# Patient Record
Sex: FEMALE | Race: WHITE | NOT HISPANIC OR LATINO | Employment: FULL TIME | RURAL
[De-identification: names, ages, dates, MRNs, and addresses within clinical notes are randomized per-mention and may not be internally consistent; named-entity substitution may affect disease eponyms.]

---

## 2019-04-09 ENCOUNTER — HISTORICAL (OUTPATIENT)
Dept: ADMINISTRATIVE | Facility: HOSPITAL | Age: 24
End: 2019-04-09

## 2019-04-12 LAB
LAB AP GENERAL CAT - HISTORICAL: NORMAL
LAB AP INTERPRETATION/RESULT - HISTORICAL: NORMAL
LAB AP SPECIMEN ADEQUACY - HISTORICAL: NORMAL
LAB AP SPECIMEN SUBMITTED - HISTORICAL: NORMAL

## 2021-07-13 ENCOUNTER — CLINICAL SUPPORT (OUTPATIENT)
Dept: PRIMARY CARE CLINIC | Facility: CLINIC | Age: 26
End: 2021-07-13

## 2021-07-13 DIAGNOSIS — Z23 NEED FOR DTAP VACCINE: ICD-10-CM

## 2021-07-13 DIAGNOSIS — Z11.1 SCREENING-PULMONARY TB: Primary | ICD-10-CM

## 2021-07-13 PROCEDURE — 90471 TDAP VACCINE GREATER THAN OR EQUAL TO 7YO IM: ICD-10-PCS | Mod: ,,, | Performed by: NURSE PRACTITIONER

## 2021-07-13 PROCEDURE — 90471 IMMUNIZATION ADMIN: CPT | Mod: ,,, | Performed by: NURSE PRACTITIONER

## 2021-07-15 LAB
TB INDURATION - 48 HR READ: 0 MM
TB INDURATION - 72 HR READ: NORMAL
TB SKIN TEST - 48 HR READ: NEGATIVE
TB SKIN TEST - 72 HR READ: NORMAL

## 2021-10-26 ENCOUNTER — OFFICE VISIT (OUTPATIENT)
Dept: OBSTETRICS AND GYNECOLOGY | Facility: CLINIC | Age: 26
End: 2021-10-26
Payer: COMMERCIAL

## 2021-10-26 VITALS
HEIGHT: 64 IN | BODY MASS INDEX: 29.02 KG/M2 | TEMPERATURE: 98 F | RESPIRATION RATE: 18 BRPM | WEIGHT: 170 LBS | HEART RATE: 78 BPM | DIASTOLIC BLOOD PRESSURE: 81 MMHG | SYSTOLIC BLOOD PRESSURE: 128 MMHG

## 2021-10-26 DIAGNOSIS — N91.1 SECONDARY AMENORRHEA: Primary | ICD-10-CM

## 2021-10-26 LAB
B-HCG UR QL: POSITIVE
CTP QC/QA: YES

## 2021-10-26 PROCEDURE — 1160F PR REVIEW ALL MEDS BY PRESCRIBER/CLIN PHARMACIST DOCUMENTED: ICD-10-PCS | Mod: ,,, | Performed by: OBSTETRICS & GYNECOLOGY

## 2021-10-26 PROCEDURE — 3008F BODY MASS INDEX DOCD: CPT | Mod: ,,, | Performed by: OBSTETRICS & GYNECOLOGY

## 2021-10-26 PROCEDURE — 81025 URINE PREGNANCY TEST: CPT | Mod: QW,,, | Performed by: OBSTETRICS & GYNECOLOGY

## 2021-10-26 PROCEDURE — 1159F PR MEDICATION LIST DOCUMENTED IN MEDICAL RECORD: ICD-10-PCS | Mod: ,,, | Performed by: OBSTETRICS & GYNECOLOGY

## 2021-10-26 PROCEDURE — 3008F PR BODY MASS INDEX (BMI) DOCUMENTED: ICD-10-PCS | Mod: ,,, | Performed by: OBSTETRICS & GYNECOLOGY

## 2021-10-26 PROCEDURE — 1160F RVW MEDS BY RX/DR IN RCRD: CPT | Mod: ,,, | Performed by: OBSTETRICS & GYNECOLOGY

## 2021-10-26 PROCEDURE — 99203 OFFICE O/P NEW LOW 30 MIN: CPT | Mod: ,,, | Performed by: OBSTETRICS & GYNECOLOGY

## 2021-10-26 PROCEDURE — 3079F PR MOST RECENT DIASTOLIC BLOOD PRESSURE 80-89 MM HG: ICD-10-PCS | Mod: ,,, | Performed by: OBSTETRICS & GYNECOLOGY

## 2021-10-26 PROCEDURE — 3074F PR MOST RECENT SYSTOLIC BLOOD PRESSURE < 130 MM HG: ICD-10-PCS | Mod: ,,, | Performed by: OBSTETRICS & GYNECOLOGY

## 2021-10-26 PROCEDURE — 1159F MED LIST DOCD IN RCRD: CPT | Mod: ,,, | Performed by: OBSTETRICS & GYNECOLOGY

## 2021-10-26 PROCEDURE — 99203 PR OFFICE/OUTPT VISIT, NEW, LEVL III, 30-44 MIN: ICD-10-PCS | Mod: ,,, | Performed by: OBSTETRICS & GYNECOLOGY

## 2021-10-26 PROCEDURE — 3079F DIAST BP 80-89 MM HG: CPT | Mod: ,,, | Performed by: OBSTETRICS & GYNECOLOGY

## 2021-10-26 PROCEDURE — 3074F SYST BP LT 130 MM HG: CPT | Mod: ,,, | Performed by: OBSTETRICS & GYNECOLOGY

## 2021-10-26 PROCEDURE — 81025 POCT URINE PREGNANCY: ICD-10-PCS | Mod: QW,,, | Performed by: OBSTETRICS & GYNECOLOGY

## 2021-10-26 RX ORDER — ONDANSETRON 4 MG/1
4 TABLET, FILM COATED ORAL EVERY 6 HOURS PRN
Qty: 90 TABLET | Refills: 3 | Status: SHIPPED | OUTPATIENT
Start: 2021-10-26 | End: 2021-11-25

## 2021-11-02 ENCOUNTER — PROCEDURE VISIT (OUTPATIENT)
Dept: OBSTETRICS AND GYNECOLOGY | Facility: CLINIC | Age: 26
End: 2021-11-02
Payer: COMMERCIAL

## 2021-11-02 DIAGNOSIS — N91.1 SECONDARY AMENORRHEA: Primary | ICD-10-CM

## 2021-11-02 PROCEDURE — 76801 PR US, OB <14WKS, TRANSABD, SINGLE GESTATION: ICD-10-PCS | Mod: ,,, | Performed by: OBSTETRICS & GYNECOLOGY

## 2021-11-02 PROCEDURE — 99499 NO LOS: ICD-10-PCS | Mod: ,,, | Performed by: OBSTETRICS & GYNECOLOGY

## 2021-11-02 PROCEDURE — 99499 UNLISTED E&M SERVICE: CPT | Mod: ,,, | Performed by: OBSTETRICS & GYNECOLOGY

## 2021-11-02 PROCEDURE — 76801 OB US < 14 WKS SINGLE FETUS: CPT | Mod: ,,, | Performed by: OBSTETRICS & GYNECOLOGY

## 2021-11-09 ENCOUNTER — ROUTINE PRENATAL (OUTPATIENT)
Dept: OBSTETRICS AND GYNECOLOGY | Facility: CLINIC | Age: 26
End: 2021-11-09
Payer: COMMERCIAL

## 2021-11-09 ENCOUNTER — PROCEDURE VISIT (OUTPATIENT)
Dept: OBSTETRICS AND GYNECOLOGY | Facility: CLINIC | Age: 26
End: 2021-11-09
Payer: COMMERCIAL

## 2021-11-09 VITALS
DIASTOLIC BLOOD PRESSURE: 74 MMHG | SYSTOLIC BLOOD PRESSURE: 128 MMHG | HEART RATE: 70 BPM | WEIGHT: 173 LBS | BODY MASS INDEX: 29.7 KG/M2

## 2021-11-09 DIAGNOSIS — N91.1 SECONDARY AMENORRHEA: Primary | ICD-10-CM

## 2021-11-09 PROCEDURE — 99214 OFFICE O/P EST MOD 30 MIN: CPT | Mod: ,,, | Performed by: OBSTETRICS & GYNECOLOGY

## 2021-11-09 PROCEDURE — 99499 UNLISTED E&M SERVICE: CPT | Mod: ,,, | Performed by: OBSTETRICS & GYNECOLOGY

## 2021-11-09 PROCEDURE — 99499 NO LOS: ICD-10-PCS | Mod: ,,, | Performed by: OBSTETRICS & GYNECOLOGY

## 2021-11-09 PROCEDURE — 76801 PR US, OB <14WKS, TRANSABD, SINGLE GESTATION: ICD-10-PCS | Mod: ,,, | Performed by: OBSTETRICS & GYNECOLOGY

## 2021-11-09 PROCEDURE — 76801 OB US < 14 WKS SINGLE FETUS: CPT | Mod: ,,, | Performed by: OBSTETRICS & GYNECOLOGY

## 2021-11-09 PROCEDURE — 99214 PR OFFICE/OUTPT VISIT, EST, LEVL IV, 30-39 MIN: ICD-10-PCS | Mod: ,,, | Performed by: OBSTETRICS & GYNECOLOGY

## 2021-11-10 ENCOUNTER — LAB VISIT (OUTPATIENT)
Dept: LAB | Facility: CLINIC | Age: 26
End: 2021-11-10
Payer: COMMERCIAL

## 2021-11-10 DIAGNOSIS — N91.1 SECONDARY AMENORRHEA: ICD-10-CM

## 2021-11-10 LAB — HCG SERPL-ACNC: NORMAL MIU/ML

## 2021-11-10 PROCEDURE — 84702 CHORIONIC GONADOTROPIN TEST: CPT

## 2021-11-10 PROCEDURE — 36415 PR COLLECTION VENOUS BLOOD,VENIPUNCTURE: ICD-10-PCS | Mod: ,,, | Performed by: CLINICAL MEDICAL LABORATORY

## 2021-11-10 PROCEDURE — 36415 COLL VENOUS BLD VENIPUNCTURE: CPT | Mod: ,,, | Performed by: CLINICAL MEDICAL LABORATORY

## 2021-11-16 ENCOUNTER — PROCEDURE VISIT (OUTPATIENT)
Dept: OBSTETRICS AND GYNECOLOGY | Facility: CLINIC | Age: 26
End: 2021-11-16
Payer: COMMERCIAL

## 2021-11-16 DIAGNOSIS — N91.1 SECONDARY AMENORRHEA: Primary | ICD-10-CM

## 2021-11-16 PROCEDURE — 76801 PR US, OB <14WKS, TRANSABD, SINGLE GESTATION: ICD-10-PCS | Mod: ,,, | Performed by: OBSTETRICS & GYNECOLOGY

## 2021-11-16 PROCEDURE — 76801 OB US < 14 WKS SINGLE FETUS: CPT | Mod: ,,, | Performed by: OBSTETRICS & GYNECOLOGY

## 2021-11-16 PROCEDURE — 99499 UNLISTED E&M SERVICE: CPT | Mod: ,,, | Performed by: OBSTETRICS & GYNECOLOGY

## 2021-11-16 PROCEDURE — 99499 NO LOS: ICD-10-PCS | Mod: ,,, | Performed by: OBSTETRICS & GYNECOLOGY

## 2021-11-18 ENCOUNTER — TELEPHONE (OUTPATIENT)
Dept: OBSTETRICS AND GYNECOLOGY | Facility: CLINIC | Age: 26
End: 2021-11-18
Payer: COMMERCIAL

## 2021-11-29 ENCOUNTER — INITIAL PRENATAL (OUTPATIENT)
Dept: OBSTETRICS AND GYNECOLOGY | Facility: CLINIC | Age: 26
End: 2021-11-29
Payer: COMMERCIAL

## 2021-11-29 VITALS
HEART RATE: 93 BPM | DIASTOLIC BLOOD PRESSURE: 70 MMHG | SYSTOLIC BLOOD PRESSURE: 118 MMHG | BODY MASS INDEX: 29.05 KG/M2 | WEIGHT: 169.25 LBS

## 2021-11-29 DIAGNOSIS — Z01.818 PREPROCEDURAL EXAMINATION: ICD-10-CM

## 2021-11-29 DIAGNOSIS — O02.89 NONVIABLE PREGNANCY: Primary | ICD-10-CM

## 2021-11-29 LAB
CRL: NORMAL
SAC DIAMETER: NORMAL

## 2021-11-29 PROCEDURE — 99203 PR OFFICE/OUTPT VISIT, NEW, LEVL III, 30-44 MIN: ICD-10-PCS | Mod: S$PBB,,, | Performed by: STUDENT IN AN ORGANIZED HEALTH CARE EDUCATION/TRAINING PROGRAM

## 2021-11-29 PROCEDURE — 99214 OFFICE O/P EST MOD 30 MIN: CPT | Mod: PBBFAC | Performed by: STUDENT IN AN ORGANIZED HEALTH CARE EDUCATION/TRAINING PROGRAM

## 2021-11-29 PROCEDURE — 99203 OFFICE O/P NEW LOW 30 MIN: CPT | Mod: S$PBB,,, | Performed by: STUDENT IN AN ORGANIZED HEALTH CARE EDUCATION/TRAINING PROGRAM

## 2021-11-29 RX ORDER — SODIUM CHLORIDE 9 MG/ML
INJECTION, SOLUTION INTRAVENOUS CONTINUOUS
Status: CANCELLED | OUTPATIENT
Start: 2021-11-29

## 2021-11-29 RX ORDER — MUPIROCIN 20 MG/G
OINTMENT TOPICAL
Status: CANCELLED | OUTPATIENT
Start: 2021-11-29

## 2021-11-29 RX ORDER — DOXYCYCLINE HYCLATE 100 MG
200 TABLET ORAL
Status: CANCELLED | OUTPATIENT
Start: 2021-11-29

## 2021-11-30 ENCOUNTER — ANESTHESIA EVENT (OUTPATIENT)
Dept: SURGERY | Facility: HOSPITAL | Age: 26
End: 2021-11-30
Payer: COMMERCIAL

## 2021-11-30 ENCOUNTER — ANESTHESIA (OUTPATIENT)
Dept: SURGERY | Facility: HOSPITAL | Age: 26
End: 2021-11-30
Payer: COMMERCIAL

## 2021-11-30 ENCOUNTER — HOSPITAL ENCOUNTER (OUTPATIENT)
Facility: HOSPITAL | Age: 26
Discharge: HOME | End: 2021-11-30
Attending: STUDENT IN AN ORGANIZED HEALTH CARE EDUCATION/TRAINING PROGRAM | Admitting: STUDENT IN AN ORGANIZED HEALTH CARE EDUCATION/TRAINING PROGRAM
Payer: COMMERCIAL

## 2021-11-30 VITALS
BODY MASS INDEX: 28.85 KG/M2 | DIASTOLIC BLOOD PRESSURE: 66 MMHG | TEMPERATURE: 98 F | OXYGEN SATURATION: 99 % | HEART RATE: 84 BPM | WEIGHT: 169 LBS | HEIGHT: 64 IN | RESPIRATION RATE: 18 BRPM | SYSTOLIC BLOOD PRESSURE: 108 MMHG

## 2021-11-30 DIAGNOSIS — I10 HYPERTENSION, UNSPECIFIED TYPE: ICD-10-CM

## 2021-11-30 DIAGNOSIS — I10 HYPERTENSION: ICD-10-CM

## 2021-11-30 DIAGNOSIS — O02.89 NONVIABLE PREGNANCY: ICD-10-CM

## 2021-11-30 PROCEDURE — 37000008 HC ANESTHESIA 1ST 15 MINUTES: Performed by: STUDENT IN AN ORGANIZED HEALTH CARE EDUCATION/TRAINING PROGRAM

## 2021-11-30 PROCEDURE — 27201423 OPTIME MED/SURG SUP & DEVICES STERILE SUPPLY: Performed by: STUDENT IN AN ORGANIZED HEALTH CARE EDUCATION/TRAINING PROGRAM

## 2021-11-30 PROCEDURE — 71000015 HC POSTOP RECOV 1ST HR: Performed by: STUDENT IN AN ORGANIZED HEALTH CARE EDUCATION/TRAINING PROGRAM

## 2021-11-30 PROCEDURE — 25000003 PHARM REV CODE 250: Performed by: STUDENT IN AN ORGANIZED HEALTH CARE EDUCATION/TRAINING PROGRAM

## 2021-11-30 PROCEDURE — 63600175 PHARM REV CODE 636 W HCPCS: Performed by: ANESTHESIOLOGY

## 2021-11-30 PROCEDURE — 27000177 HC AIRWAY, LARYNGEAL MASK: Performed by: ANESTHESIOLOGY

## 2021-11-30 PROCEDURE — 88305 SURGICAL PATHOLOGY: ICD-10-PCS | Mod: 26,XU,, | Performed by: PATHOLOGY

## 2021-11-30 PROCEDURE — D9220A PRA ANESTHESIA: Mod: ANES,,, | Performed by: ANESTHESIOLOGY

## 2021-11-30 PROCEDURE — 63600175 PHARM REV CODE 636 W HCPCS: Performed by: NURSE ANESTHETIST, CERTIFIED REGISTERED

## 2021-11-30 PROCEDURE — 27000655: Performed by: ANESTHESIOLOGY

## 2021-11-30 PROCEDURE — 88305 TISSUE EXAM BY PATHOLOGIST: CPT | Mod: 26,,, | Performed by: PATHOLOGY

## 2021-11-30 PROCEDURE — 93005 ELECTROCARDIOGRAM TRACING: CPT

## 2021-11-30 PROCEDURE — D9220A PRA ANESTHESIA: ICD-10-PCS | Mod: ANES,,, | Performed by: ANESTHESIOLOGY

## 2021-11-30 PROCEDURE — 27000716 HC OXISENSOR PROBE, ANY SIZE: Performed by: ANESTHESIOLOGY

## 2021-11-30 PROCEDURE — 25000003 PHARM REV CODE 250: Performed by: ANESTHESIOLOGY

## 2021-11-30 PROCEDURE — 25000003 PHARM REV CODE 250: Performed by: NURSE ANESTHETIST, CERTIFIED REGISTERED

## 2021-11-30 PROCEDURE — D9220A PRA ANESTHESIA: Mod: CRNA,,, | Performed by: NURSE ANESTHETIST, CERTIFIED REGISTERED

## 2021-11-30 PROCEDURE — 37000009 HC ANESTHESIA EA ADD 15 MINS: Performed by: STUDENT IN AN ORGANIZED HEALTH CARE EDUCATION/TRAINING PROGRAM

## 2021-11-30 PROCEDURE — 71000033 HC RECOVERY, INTIAL HOUR: Performed by: STUDENT IN AN ORGANIZED HEALTH CARE EDUCATION/TRAINING PROGRAM

## 2021-11-30 PROCEDURE — 36000705 HC OR TIME LEV I EA ADD 15 MIN: Performed by: STUDENT IN AN ORGANIZED HEALTH CARE EDUCATION/TRAINING PROGRAM

## 2021-11-30 PROCEDURE — 59820 CARE OF MISCARRIAGE: CPT | Mod: ,,, | Performed by: STUDENT IN AN ORGANIZED HEALTH CARE EDUCATION/TRAINING PROGRAM

## 2021-11-30 PROCEDURE — 36000704 HC OR TIME LEV I 1ST 15 MIN: Performed by: STUDENT IN AN ORGANIZED HEALTH CARE EDUCATION/TRAINING PROGRAM

## 2021-11-30 PROCEDURE — 27000510 HC BLANKET BAIR HUGGER ANY SIZE: Performed by: ANESTHESIOLOGY

## 2021-11-30 PROCEDURE — D9220A PRA ANESTHESIA: ICD-10-PCS | Mod: CRNA,,, | Performed by: NURSE ANESTHETIST, CERTIFIED REGISTERED

## 2021-11-30 PROCEDURE — 88305 TISSUE EXAM BY PATHOLOGIST: CPT | Mod: SUR | Performed by: STUDENT IN AN ORGANIZED HEALTH CARE EDUCATION/TRAINING PROGRAM

## 2021-11-30 PROCEDURE — 59820 PR SURG RX MISSED ABORTN,1ST TRI: ICD-10-PCS | Mod: ,,, | Performed by: STUDENT IN AN ORGANIZED HEALTH CARE EDUCATION/TRAINING PROGRAM

## 2021-11-30 RX ORDER — MORPHINE SULFATE 10 MG/ML
4 INJECTION INTRAMUSCULAR; INTRAVENOUS; SUBCUTANEOUS EVERY 5 MIN PRN
Status: DISCONTINUED | OUTPATIENT
Start: 2021-11-30 | End: 2021-11-30 | Stop reason: HOSPADM

## 2021-11-30 RX ORDER — DIPHENHYDRAMINE HYDROCHLORIDE 50 MG/ML
25 INJECTION INTRAMUSCULAR; INTRAVENOUS EVERY 6 HOURS PRN
Status: DISCONTINUED | OUTPATIENT
Start: 2021-11-30 | End: 2021-11-30 | Stop reason: HOSPADM

## 2021-11-30 RX ORDER — DIPHENHYDRAMINE HCL 25 MG
25 CAPSULE ORAL EVERY 4 HOURS PRN
Status: CANCELLED | OUTPATIENT
Start: 2021-11-30

## 2021-11-30 RX ORDER — OXYCODONE HYDROCHLORIDE 5 MG/1
5 TABLET ORAL
Status: DISCONTINUED | OUTPATIENT
Start: 2021-11-30 | End: 2021-11-30 | Stop reason: HOSPADM

## 2021-11-30 RX ORDER — LIDOCAINE HYDROCHLORIDE 10 MG/ML
1 INJECTION, SOLUTION EPIDURAL; INFILTRATION; INTRACAUDAL; PERINEURAL ONCE
Status: DISCONTINUED | OUTPATIENT
Start: 2021-11-30 | End: 2021-11-30 | Stop reason: HOSPADM

## 2021-11-30 RX ORDER — DOXYCYCLINE HYCLATE 100 MG
200 TABLET ORAL
Status: DISCONTINUED | OUTPATIENT
Start: 2021-11-30 | End: 2021-11-30 | Stop reason: HOSPADM

## 2021-11-30 RX ORDER — PROCHLORPERAZINE EDISYLATE 5 MG/ML
5 INJECTION INTRAMUSCULAR; INTRAVENOUS EVERY 6 HOURS PRN
Status: DISCONTINUED | OUTPATIENT
Start: 2021-11-30 | End: 2021-11-30 | Stop reason: HOSPADM

## 2021-11-30 RX ORDER — SODIUM CHLORIDE, SODIUM LACTATE, POTASSIUM CHLORIDE, CALCIUM CHLORIDE 600; 310; 30; 20 MG/100ML; MG/100ML; MG/100ML; MG/100ML
125 INJECTION, SOLUTION INTRAVENOUS CONTINUOUS
Status: DISCONTINUED | OUTPATIENT
Start: 2021-11-30 | End: 2021-11-30 | Stop reason: HOSPADM

## 2021-11-30 RX ORDER — SODIUM CHLORIDE 9 MG/ML
INJECTION, SOLUTION INTRAVENOUS CONTINUOUS
Status: DISCONTINUED | OUTPATIENT
Start: 2021-11-30 | End: 2021-11-30 | Stop reason: HOSPADM

## 2021-11-30 RX ORDER — DEXAMETHASONE SODIUM PHOSPHATE 4 MG/ML
INJECTION, SOLUTION INTRA-ARTICULAR; INTRALESIONAL; INTRAMUSCULAR; INTRAVENOUS; SOFT TISSUE
Status: DISCONTINUED | OUTPATIENT
Start: 2021-11-30 | End: 2021-11-30

## 2021-11-30 RX ORDER — MUPIROCIN 20 MG/G
OINTMENT TOPICAL
Status: DISCONTINUED | OUTPATIENT
Start: 2021-11-30 | End: 2021-11-30 | Stop reason: HOSPADM

## 2021-11-30 RX ORDER — ONDANSETRON 2 MG/ML
INJECTION INTRAMUSCULAR; INTRAVENOUS
Status: DISCONTINUED | OUTPATIENT
Start: 2021-11-30 | End: 2021-11-30

## 2021-11-30 RX ORDER — HYDROCODONE BITARTRATE AND ACETAMINOPHEN 5; 325 MG/1; MG/1
1 TABLET ORAL EVERY 4 HOURS PRN
Status: CANCELLED | OUTPATIENT
Start: 2021-11-30

## 2021-11-30 RX ORDER — HYDROMORPHONE HYDROCHLORIDE 2 MG/ML
0.5 INJECTION, SOLUTION INTRAMUSCULAR; INTRAVENOUS; SUBCUTANEOUS EVERY 5 MIN PRN
Status: DISCONTINUED | OUTPATIENT
Start: 2021-11-30 | End: 2021-11-30 | Stop reason: HOSPADM

## 2021-11-30 RX ORDER — SODIUM CHLORIDE, SODIUM LACTATE, POTASSIUM CHLORIDE, CALCIUM CHLORIDE 600; 310; 30; 20 MG/100ML; MG/100ML; MG/100ML; MG/100ML
INJECTION, SOLUTION INTRAVENOUS CONTINUOUS
Status: DISCONTINUED | OUTPATIENT
Start: 2021-11-30 | End: 2021-11-30 | Stop reason: HOSPADM

## 2021-11-30 RX ORDER — DIPHENHYDRAMINE HYDROCHLORIDE 50 MG/ML
25 INJECTION INTRAMUSCULAR; INTRAVENOUS EVERY 4 HOURS PRN
Status: CANCELLED | OUTPATIENT
Start: 2021-11-30

## 2021-11-30 RX ORDER — IBUPROFEN 600 MG/1
600 TABLET ORAL EVERY 6 HOURS PRN
Status: CANCELLED | OUTPATIENT
Start: 2021-11-30

## 2021-11-30 RX ORDER — FENTANYL CITRATE 50 UG/ML
INJECTION, SOLUTION INTRAMUSCULAR; INTRAVENOUS
Status: DISCONTINUED | OUTPATIENT
Start: 2021-11-30 | End: 2021-11-30

## 2021-11-30 RX ORDER — PROPOFOL 10 MG/ML
INJECTION, EMULSION INTRAVENOUS
Status: DISCONTINUED | OUTPATIENT
Start: 2021-11-30 | End: 2021-11-30

## 2021-11-30 RX ORDER — MIDAZOLAM HYDROCHLORIDE 1 MG/ML
INJECTION INTRAMUSCULAR; INTRAVENOUS
Status: DISCONTINUED | OUTPATIENT
Start: 2021-11-30 | End: 2021-11-30

## 2021-11-30 RX ORDER — ONDANSETRON 2 MG/ML
4 INJECTION INTRAMUSCULAR; INTRAVENOUS DAILY PRN
Status: DISCONTINUED | OUTPATIENT
Start: 2021-11-30 | End: 2021-11-30 | Stop reason: HOSPADM

## 2021-11-30 RX ORDER — MEPERIDINE HYDROCHLORIDE 25 MG/ML
25 INJECTION INTRAMUSCULAR; INTRAVENOUS; SUBCUTANEOUS EVERY 10 MIN PRN
Status: DISCONTINUED | OUTPATIENT
Start: 2021-11-30 | End: 2021-11-30 | Stop reason: HOSPADM

## 2021-11-30 RX ORDER — LIDOCAINE HYDROCHLORIDE 20 MG/ML
INJECTION, SOLUTION EPIDURAL; INFILTRATION; INTRACAUDAL; PERINEURAL
Status: DISCONTINUED | OUTPATIENT
Start: 2021-11-30 | End: 2021-11-30

## 2021-11-30 RX ORDER — ONDANSETRON 4 MG/1
8 TABLET, ORALLY DISINTEGRATING ORAL EVERY 8 HOURS PRN
Status: DISCONTINUED | OUTPATIENT
Start: 2021-11-30 | End: 2021-11-30 | Stop reason: HOSPADM

## 2021-11-30 RX ADMIN — DEXAMETHASONE SODIUM PHOSPHATE 12 MG: 4 INJECTION, SOLUTION INTRA-ARTICULAR; INTRALESIONAL; INTRAMUSCULAR; INTRAVENOUS; SOFT TISSUE at 09:11

## 2021-11-30 RX ADMIN — SODIUM CHLORIDE, POTASSIUM CHLORIDE, SODIUM LACTATE AND CALCIUM CHLORIDE: 600; 310; 30; 20 INJECTION, SOLUTION INTRAVENOUS at 08:11

## 2021-11-30 RX ADMIN — FENTANYL CITRATE 50 MCG: 50 INJECTION INTRAMUSCULAR; INTRAVENOUS at 09:11

## 2021-11-30 RX ADMIN — PROMETHAZINE HYDROCHLORIDE 12.5 MG: 25 INJECTION INTRAMUSCULAR; INTRAVENOUS at 08:11

## 2021-11-30 RX ADMIN — SODIUM CHLORIDE, POTASSIUM CHLORIDE, SODIUM LACTATE AND CALCIUM CHLORIDE: 600; 310; 30; 20 INJECTION, SOLUTION INTRAVENOUS at 09:11

## 2021-11-30 RX ADMIN — PROPOFOL 170 MG: 10 INJECTION, EMULSION INTRAVENOUS at 09:11

## 2021-11-30 RX ADMIN — CEFAZOLIN 2 G: 1 INJECTION, POWDER, FOR SOLUTION INTRAMUSCULAR; INTRAVENOUS; PARENTERAL at 09:11

## 2021-11-30 RX ADMIN — ONDANSETRON 4 MG: 2 INJECTION INTRAMUSCULAR; INTRAVENOUS at 09:11

## 2021-11-30 RX ADMIN — DOXYCYCLINE HYCLATE 200 MG: 100 CAPSULE ORAL at 07:11

## 2021-11-30 RX ADMIN — MIDAZOLAM HYDROCHLORIDE 2 MG: 1 INJECTION, SOLUTION INTRAMUSCULAR; INTRAVENOUS at 09:11

## 2021-11-30 RX ADMIN — LIDOCAINE HYDROCHLORIDE 80 MG: 20 INJECTION, SOLUTION INTRAVENOUS at 09:11

## 2021-12-01 LAB
ESTROGEN SERPL-MCNC: NORMAL PG/ML
LAB AP GROSS DESCRIPTION: NORMAL
LAB AP LABORATORY NOTES: NORMAL
T3RU NFR SERPL: NORMAL %

## 2021-12-08 ENCOUNTER — OFFICE VISIT (OUTPATIENT)
Dept: OBSTETRICS AND GYNECOLOGY | Facility: CLINIC | Age: 26
End: 2021-12-08
Payer: COMMERCIAL

## 2021-12-08 VITALS
BODY MASS INDEX: 28.85 KG/M2 | OXYGEN SATURATION: 98 % | HEART RATE: 105 BPM | SYSTOLIC BLOOD PRESSURE: 122 MMHG | WEIGHT: 169 LBS | HEIGHT: 64 IN | DIASTOLIC BLOOD PRESSURE: 68 MMHG

## 2021-12-08 DIAGNOSIS — Z48.89 ENCOUNTER FOR POSTOPERATIVE CARE: ICD-10-CM

## 2021-12-08 PROCEDURE — 99024 PR POST-OP FOLLOW-UP VISIT: ICD-10-PCS | Mod: ,,, | Performed by: STUDENT IN AN ORGANIZED HEALTH CARE EDUCATION/TRAINING PROGRAM

## 2021-12-08 PROCEDURE — 99213 OFFICE O/P EST LOW 20 MIN: CPT | Mod: PBBFAC | Performed by: STUDENT IN AN ORGANIZED HEALTH CARE EDUCATION/TRAINING PROGRAM

## 2021-12-08 PROCEDURE — 99024 POSTOP FOLLOW-UP VISIT: CPT | Mod: ,,, | Performed by: STUDENT IN AN ORGANIZED HEALTH CARE EDUCATION/TRAINING PROGRAM

## 2021-12-20 ENCOUNTER — TELEPHONE (OUTPATIENT)
Dept: OBSTETRICS AND GYNECOLOGY | Facility: CLINIC | Age: 26
End: 2021-12-20
Payer: COMMERCIAL

## 2022-04-06 ENCOUNTER — PATIENT MESSAGE (OUTPATIENT)
Dept: OBSTETRICS AND GYNECOLOGY | Facility: CLINIC | Age: 27
End: 2022-04-06
Payer: COMMERCIAL

## 2022-04-06 DIAGNOSIS — Z34.90 PREGNANCY WITH FETUS OF UNKNOWN GESTATIONAL AGE: Primary | ICD-10-CM

## 2022-04-14 DIAGNOSIS — Z34.90 PREGNANCY AT EARLY STAGE: Primary | ICD-10-CM

## 2022-04-14 LAB — HCG SERPL-ACNC: NORMAL MIU/ML

## 2022-04-14 PROCEDURE — 36415 COLL VENOUS BLD VENIPUNCTURE: CPT | Performed by: NURSE PRACTITIONER

## 2022-04-14 PROCEDURE — 84702 CHORIONIC GONADOTROPIN TEST: CPT | Performed by: NURSE PRACTITIONER

## 2022-04-19 ENCOUNTER — HOSPITAL ENCOUNTER (OUTPATIENT)
Dept: RADIOLOGY | Facility: HOSPITAL | Age: 27
Discharge: HOME | End: 2022-04-19
Attending: STUDENT IN AN ORGANIZED HEALTH CARE EDUCATION/TRAINING PROGRAM
Payer: COMMERCIAL

## 2022-04-19 DIAGNOSIS — Z34.90 PREGNANCY WITH FETUS OF UNKNOWN GESTATIONAL AGE: ICD-10-CM

## 2022-04-19 PROCEDURE — 76817 US OB <14 WEEKS, TRANSABDOM & TRANSVAG, SINGLE GESTATION (XPD): ICD-10-PCS | Mod: 26,,, | Performed by: RADIOLOGY

## 2022-04-19 PROCEDURE — 76801 OB US < 14 WKS SINGLE FETUS: CPT | Mod: 26,,, | Performed by: RADIOLOGY

## 2022-04-19 PROCEDURE — 76801 US OB <14 WEEKS, TRANSABDOM & TRANSVAG, SINGLE GESTATION (XPD): ICD-10-PCS | Mod: 26,,, | Performed by: RADIOLOGY

## 2022-04-19 PROCEDURE — 76801 OB US < 14 WKS SINGLE FETUS: CPT | Mod: TC

## 2022-04-19 PROCEDURE — 76817 TRANSVAGINAL US OBSTETRIC: CPT | Mod: 26,,, | Performed by: RADIOLOGY

## 2022-04-27 ENCOUNTER — HOSPITAL ENCOUNTER (OUTPATIENT)
Dept: RADIOLOGY | Facility: HOSPITAL | Age: 27
Discharge: HOME | End: 2022-04-27
Attending: STUDENT IN AN ORGANIZED HEALTH CARE EDUCATION/TRAINING PROGRAM
Payer: COMMERCIAL

## 2022-04-27 ENCOUNTER — INITIAL PRENATAL (OUTPATIENT)
Dept: OBSTETRICS AND GYNECOLOGY | Facility: CLINIC | Age: 27
End: 2022-04-27
Payer: COMMERCIAL

## 2022-04-27 VITALS — WEIGHT: 175 LBS | DIASTOLIC BLOOD PRESSURE: 80 MMHG | BODY MASS INDEX: 30.04 KG/M2 | SYSTOLIC BLOOD PRESSURE: 110 MMHG

## 2022-04-27 DIAGNOSIS — Z3A.01 LESS THAN 8 WEEKS GESTATION OF PREGNANCY: Primary | ICD-10-CM

## 2022-04-27 DIAGNOSIS — Z3A.01 LESS THAN 8 WEEKS GESTATION OF PREGNANCY: ICD-10-CM

## 2022-04-27 DIAGNOSIS — Z3A.01 6 WEEKS GESTATION OF PREGNANCY: ICD-10-CM

## 2022-04-27 DIAGNOSIS — O09.291 HISTORY OF MISCARRIAGE, CURRENTLY PREGNANT, FIRST TRIMESTER: ICD-10-CM

## 2022-04-27 DIAGNOSIS — O09.299 HISTORY OF MISCARRIAGE, CURRENTLY PREGNANT: ICD-10-CM

## 2022-04-27 DIAGNOSIS — Z36.9 ENCOUNTER FOR ANTENATAL SCREENING: Primary | ICD-10-CM

## 2022-04-27 LAB
ANION GAP SERPL CALCULATED.3IONS-SCNC: 14 MMOL/L (ref 7–16)
BASOPHILS # BLD AUTO: 0.05 K/UL (ref 0–0.2)
BASOPHILS NFR BLD AUTO: 0.5 % (ref 0–1)
BILIRUB SERPL-MCNC: NEGATIVE MG/DL
BLOOD URINE, POC: NEGATIVE
BUN SERPL-MCNC: 6 MG/DL (ref 7–18)
BUN/CREAT SERPL: 10 (ref 6–20)
CALCIUM SERPL-MCNC: 8.9 MG/DL (ref 8.5–10.1)
CHLORIDE SERPL-SCNC: 102 MMOL/L (ref 98–107)
CO2 SERPL-SCNC: 24 MMOL/L (ref 21–32)
COLOR, POC UA: YELLOW
CREAT SERPL-MCNC: 0.63 MG/DL (ref 0.55–1.02)
DIFFERENTIAL METHOD BLD: ABNORMAL
EOSINOPHIL # BLD AUTO: 0.03 K/UL (ref 0–0.5)
EOSINOPHIL NFR BLD AUTO: 0.3 % (ref 1–4)
ERYTHROCYTE [DISTWIDTH] IN BLOOD BY AUTOMATED COUNT: 11.9 % (ref 11.5–14.5)
GLUCOSE SERPL-MCNC: 90 MG/DL (ref 74–106)
GLUCOSE UR QL STRIP: NEGATIVE
HBV SURFACE AG SERPL QL IA: NORMAL
HCT VFR BLD AUTO: 40.1 % (ref 38–47)
HCV AB SER QL: NORMAL
HGB BLD-MCNC: 13.8 G/DL (ref 12–16)
HIV 1+O+2 AB SERPL QL: NORMAL
IMM GRANULOCYTES # BLD AUTO: 0.04 K/UL (ref 0–0.04)
IMM GRANULOCYTES NFR BLD: 0.4 % (ref 0–0.4)
INDIRECT COOMBS: NORMAL
KETONES UR QL STRIP: NEGATIVE
LEUKOCYTE ESTERASE URINE, POC: NEGATIVE
LYMPHOCYTES # BLD AUTO: 2.44 K/UL (ref 1–4.8)
LYMPHOCYTES NFR BLD AUTO: 24.4 % (ref 27–41)
MCH RBC QN AUTO: 30.9 PG (ref 27–31)
MCHC RBC AUTO-ENTMCNC: 34.4 G/DL (ref 32–36)
MCV RBC AUTO: 89.7 FL (ref 80–96)
MONOCYTES # BLD AUTO: 0.74 K/UL (ref 0–0.8)
MONOCYTES NFR BLD AUTO: 7.4 % (ref 2–6)
MPC BLD CALC-MCNC: 10.5 FL (ref 9.4–12.4)
NEUTROPHILS # BLD AUTO: 6.71 K/UL (ref 1.8–7.7)
NEUTROPHILS NFR BLD AUTO: 67 % (ref 53–65)
NITRITE, POC UA: NEGATIVE
NRBC # BLD AUTO: 0 X10E3/UL
NRBC, AUTO (.00): 0 %
PH, POC UA: 7.5
PLATELET # BLD AUTO: 319 K/UL (ref 150–400)
POTASSIUM SERPL-SCNC: 4.1 MMOL/L (ref 3.5–5.1)
PROGEST SERPL-MCNC: 26.4 NG/ML
PROTEIN, POC: NEGATIVE
RBC # BLD AUTO: 4.47 M/UL (ref 4.2–5.4)
RH BLD: NORMAL
RUBV IGG SER-ACNC: 90.7 IU/ML
SODIUM SERPL-SCNC: 136 MMOL/L (ref 136–145)
SPECIFIC GRAVITY, POC UA: 1.01
SYPHILIS AB INTERPRETATION: NORMAL
UROBILINOGEN, POC UA: 0.2
WBC # BLD AUTO: 10.01 K/UL (ref 4.5–11)

## 2022-04-27 PROCEDURE — 86780 TREPONEMA PALLIDUM (SYPHILIS) ANTIBODY: ICD-10-PCS | Mod: ,,, | Performed by: CLINICAL MEDICAL LABORATORY

## 2022-04-27 PROCEDURE — 76816 OB US FOLLOW-UP PER FETUS: CPT | Mod: TC

## 2022-04-27 PROCEDURE — G0481 DRUG TEST DEF 8-14 CLASSES: HCPCS | Mod: ,,, | Performed by: CLINICAL MEDICAL LABORATORY

## 2022-04-27 PROCEDURE — 81003 URINALYSIS AUTO W/O SCOPE: CPT | Mod: PBBFAC | Performed by: STUDENT IN AN ORGANIZED HEALTH CARE EDUCATION/TRAINING PROGRAM

## 2022-04-27 PROCEDURE — G0481 PR DRUG TEST DEF 8-14 CLASSES: ICD-10-PCS | Mod: ,,, | Performed by: CLINICAL MEDICAL LABORATORY

## 2022-04-27 PROCEDURE — 87491 CHLMYD TRACH DNA AMP PROBE: CPT | Mod: ,,, | Performed by: CLINICAL MEDICAL LABORATORY

## 2022-04-27 PROCEDURE — 76816 OB US FOLLOW-UP PER FETUS: CPT | Mod: 26,,, | Performed by: RADIOLOGY

## 2022-04-27 PROCEDURE — 86803 HEPATITIS C ANTIBODY: ICD-10-PCS | Mod: ,,, | Performed by: CLINICAL MEDICAL LABORATORY

## 2022-04-27 PROCEDURE — 86803 HEPATITIS C AB TEST: CPT | Mod: ,,, | Performed by: CLINICAL MEDICAL LABORATORY

## 2022-04-27 PROCEDURE — 87086 URINE CULTURE/COLONY COUNT: CPT | Mod: ,,, | Performed by: CLINICAL MEDICAL LABORATORY

## 2022-04-27 PROCEDURE — 87389 HIV-1 AG W/HIV-1&-2 AB AG IA: CPT | Mod: ,,, | Performed by: CLINICAL MEDICAL LABORATORY

## 2022-04-27 PROCEDURE — 0501F PR INTITIAL PRENATAL CARE VISIT W/FLOW SHEET: ICD-10-PCS | Mod: S$PBB,,, | Performed by: STUDENT IN AN ORGANIZED HEALTH CARE EDUCATION/TRAINING PROGRAM

## 2022-04-27 PROCEDURE — 84144 ASSAY OF PROGESTERONE: CPT | Mod: ,,, | Performed by: CLINICAL MEDICAL LABORATORY

## 2022-04-27 PROCEDURE — 86900 BLOOD TYPING SEROLOGIC ABO: CPT | Performed by: STUDENT IN AN ORGANIZED HEALTH CARE EDUCATION/TRAINING PROGRAM

## 2022-04-27 PROCEDURE — 84144 PROGESTERONE: ICD-10-PCS | Mod: ,,, | Performed by: CLINICAL MEDICAL LABORATORY

## 2022-04-27 PROCEDURE — 99213 OFFICE O/P EST LOW 20 MIN: CPT | Mod: PBBFAC | Performed by: STUDENT IN AN ORGANIZED HEALTH CARE EDUCATION/TRAINING PROGRAM

## 2022-04-27 PROCEDURE — 87491 CHLAMYDIA/GONORRHOEAE(GC), PCR: ICD-10-PCS | Mod: ,,, | Performed by: CLINICAL MEDICAL LABORATORY

## 2022-04-27 PROCEDURE — 76816 US OB FOLLOW UP EA GESTATION TRANSABDOMINAL: ICD-10-PCS | Mod: 26,,, | Performed by: RADIOLOGY

## 2022-04-27 PROCEDURE — 86850 RBC ANTIBODY SCREEN: CPT | Performed by: STUDENT IN AN ORGANIZED HEALTH CARE EDUCATION/TRAINING PROGRAM

## 2022-04-27 PROCEDURE — 87086 CULTURE, URINE: ICD-10-PCS | Mod: ,,, | Performed by: CLINICAL MEDICAL LABORATORY

## 2022-04-27 PROCEDURE — 87591 N.GONORRHOEAE DNA AMP PROB: CPT | Mod: ,,, | Performed by: CLINICAL MEDICAL LABORATORY

## 2022-04-27 PROCEDURE — 87591 CHLAMYDIA/GONORRHOEAE(GC), PCR: ICD-10-PCS | Mod: ,,, | Performed by: CLINICAL MEDICAL LABORATORY

## 2022-04-27 PROCEDURE — 86780 TREPONEMA PALLIDUM: CPT | Mod: ,,, | Performed by: CLINICAL MEDICAL LABORATORY

## 2022-04-27 PROCEDURE — 86762 RUBELLA ANTIBODY, IGG: ICD-10-PCS | Mod: ,,, | Performed by: CLINICAL MEDICAL LABORATORY

## 2022-04-27 PROCEDURE — 87340 HEPATITIS B SURFACE AG IA: CPT | Mod: ,,, | Performed by: CLINICAL MEDICAL LABORATORY

## 2022-04-27 PROCEDURE — 87340 HEPATITIS B SURFACE ANTIGEN: ICD-10-PCS | Mod: ,,, | Performed by: CLINICAL MEDICAL LABORATORY

## 2022-04-27 PROCEDURE — 36415 COLL VENOUS BLD VENIPUNCTURE: CPT | Performed by: STUDENT IN AN ORGANIZED HEALTH CARE EDUCATION/TRAINING PROGRAM

## 2022-04-27 PROCEDURE — 0501F PRENATAL FLOW SHEET: CPT | Mod: S$PBB,,, | Performed by: STUDENT IN AN ORGANIZED HEALTH CARE EDUCATION/TRAINING PROGRAM

## 2022-04-27 PROCEDURE — 87389 HIV 1 / 2 ANTIBODY: ICD-10-PCS | Mod: ,,, | Performed by: CLINICAL MEDICAL LABORATORY

## 2022-04-27 PROCEDURE — 86762 RUBELLA ANTIBODY: CPT | Mod: ,,, | Performed by: CLINICAL MEDICAL LABORATORY

## 2022-04-27 RX ORDER — PROGESTERONE 100 MG/1
100 CAPSULE ORAL DAILY
Qty: 35 CAPSULE | Refills: 0 | Status: SHIPPED | OUTPATIENT
Start: 2022-04-27 | End: 2022-11-09

## 2022-04-27 NOTE — PROGRESS NOTES
New OB History and Physical    CC:   Chief Complaint   Patient presents with    Routine Prenatal Visit        Assessment/Plan:   Lorenza Santiago is a 26 y.o. at 6w6d who presents for new OB visit    Problem List Items Addressed This Visit        Obstetric    6 weeks gestation of pregnancy    Overview     Estimated Date of Delivery: 12/15/22 established by LMP, early US consistent    First Trimester  - Prenatal Battery: ordered  - UDS: ordered  - Pap Smear: get records  - GC/C: ordered      Second Trimester  - Quad: Plan to offer at 15-19wks.  - Anatomy Ultrasound: Will schedule for 20wks    Third Trimester  - 28 wk labs:   - Rh Status: pending  - 1Hr GCT:   - GC/C:   - GBS:   - EFW/CHANTAL:    Vaccines  - Influenza:  - TDAP: Plan to give around 32wks  - COVID:     Contraception:           History of miscarriage, currently pregnant    Overview     Nonviable pregnancy with G1  Required SDC  Progesterone level ordered  Will supplement progesterone until 12 weeks             Other Visit Diagnoses     Less than 8 weeks gestation of pregnancy    -  Primary    Relevant Orders    US OB Follow-up Ea Gestation Transabd (Completed)    Chlamydia/GC, PCR (Completed)    OB Drug Screen Definitive, Urine    Hepatitis C Antibody (Completed)    CBC Auto Differential (Completed)    Basic Metabolic Panel (Completed)    Hepatitis B Surface Antigen (Completed)    Rubella Antibody, IgG (Completed)    Urine culture (Completed)    POCT URINALYSIS W/O SCOPE (Completed)    Type & Screen (Completed)    HIV 1/2 Ag/Ab (4th Gen) (Completed)    Treponema Pallidum (Syphillis) Antibody (Completed)    History of miscarriage, currently pregnant, first trimester        Relevant Orders    Progesterone (Completed)          HPI: Lorenza Santiago is a 26 y.o. at 6w6d who presents for new OB visit.       Review of Systems: The following ROS was otherwise negative, except as noted in the HPI:  constitutional, HEENT, respiratory, cardiovascular,  gastrointestinal, genitourinary, skin, musculoskeletal, neurological, psych    Gynecologic History: Denies hx of abnl pap smears.  No hx of STIs.    Obstetrical History:  OB History        2    Para        Term                AB   1    Living           SAB   1    IAB        Ectopic        Multiple        Live Births                     Past Medical History:   No past medical history on file.    Medications:  Medication List with Changes/Refills   New Medications    PROGESTERONE (PROMETRIUM) 100 MG CAPSULE    Take 1 capsule (100 mg total) by mouth once daily.   Current Medications    PNV,CALCIUM 72-IRON-FOLIC ACID (PRENATAL VITAMIN PLUS LOW IRON) 27 MG IRON- 1 MG TAB    Take 1 tablet (1 each total) by mouth once daily.         Allergies:  Patient has no known allergies.    Surgical History:  Past Surgical History:   Procedure Laterality Date    DILATION AND CURETTAGE OF UTERUS  2022    DILATION AND CURETTAGE OF UTERUS USING SUCTION N/A 2021    Procedure: DILATION AND CURETTAGE, UTERUS, USING SUCTION;  Surgeon: Onel Beltran DO;  Location: [facility], [place];  Service: OB/GYN;  Laterality: N/A;       Family History:  Family History   Family history unknown: Yes       Social History:  Social History     Substance and Sexual Activity   Alcohol Use Not Currently     Social History     Substance and Sexual Activity   Drug Use Never     Social History     Tobacco Use   Smoking Status Never Smoker   Smokeless Tobacco Never Used       Physical Exam:  /80   Wt 79.4 kg (175 lb)   LMP  (Exact Date)   BMI 30.04 kg/m²     General: Alert, well appearing, no acute distress  Head: Normocephalic, atraumatic  Lungs: unlabored respirations  Abdomen: Gravid, soft, nontender   Pelvic: deferred  Extremities: No redness or tenderness  Skin: Well perfused, normal coloration and turgor, no lesions or rashes visualized  Neuro: Alert, oriented, normal speech, no focal deficits, moves extremities  appropriately  Psych: Appropriate, normal affect, appears stated age  Osteopathic: No TART changes      Reviewed frequency of appointments for routine prenatal care, call group partners.  Pregnancy book given, encouraged to read through for questions regarding food/drink and medication safety in pregnancy.  Encouraged Mychart access.  Instructed to stop by the lab after visit for NOB labwork.

## 2022-04-28 PROBLEM — Z3A.01 6 WEEKS GESTATION OF PREGNANCY: Status: ACTIVE | Noted: 2022-04-28

## 2022-04-28 PROBLEM — O09.299 HISTORY OF MISCARRIAGE, CURRENTLY PREGNANT: Status: ACTIVE | Noted: 2022-04-28

## 2022-04-28 LAB
CHLAMYDIA BY PCR: NEGATIVE
N. GONORRHOEAE (GC) BY PCR: NEGATIVE

## 2022-04-29 LAB
7-AMINOCLONAZEPAM, URINE (RUSH): NEGATIVE 25 NG/ML
A-HYDROXYALPRAZOLAM, URINE (RUSH): NEGATIVE 25 NG/ML
AMITRIPTYLINE, URINE (RUSH): NEGATIVE 25 NG/ML
BENZOYLECGONINE, URINE (RUSH): NEGATIVE 100 NG/ML
BUTALBITAL, URINE (RUSH): NEGATIVE 50 NG/ML
CARISOPRODOL, URINE (RUSH): NEGATIVE 100 NG/ML
CODEINE, URINE (RUSH): NEGATIVE 25 NG/ML
CREAT UR-MCNC: 60 MG/DL (ref 28–219)
EDDP, URINE (RUSH): NEGATIVE 25 NG/ML
HYDROCODONE, URINE (RUSH): NEGATIVE 25 NG/ML
HYDROMORPHONE, URINE (RUSH): NEGATIVE 25 NG/ML
LORAZEPAM, URINE (RUSH): NEGATIVE 25 NG/ML
MEPROBAMATE, URINE (RUSH): NEGATIVE 100 NG/ML
METHADONE UR QL SCN: NEGATIVE 25 NG/ML
METHAMPHET UR QL SCN: NEGATIVE 100 NG/ML
MORPHINE, URINE (RUSH): NEGATIVE 25 NG/ML
NORDIAZEPAM, URINE (RUSH): NEGATIVE 25 NG/ML
NORHYDROCODONE, URINE (RUSH): NEGATIVE 50 NG/ML
NOROXYCODONE HCL, URINE (RUSH): NEGATIVE 50 NG/ML
OXAZEPAM, URINE (RUSH): NEGATIVE 25 NG/ML
OXYCODONE UR QL SCN: NEGATIVE 25 NG/ML
OXYMORPHONE, URINE (RUSH): NEGATIVE 25 NG/ML
PH UR STRIP: 7.5 PH UNITS
PHENOBARBITAL, URINE (RUSH): NEGATIVE 50 NG/ML
SECOBARBITAL, URINE (RUSH): NEGATIVE 50 NG/ML
SP GR UR STRIP: 1.01
TEMAZEPAM, URINE (RUSH): NEGATIVE 25 NG/ML
THC-COOH, URINE (RUSH): NEGATIVE 25 NG/ML
UA COMPLETE W REFLEX CULTURE PNL UR: NORMAL

## 2022-05-03 ENCOUNTER — PATIENT MESSAGE (OUTPATIENT)
Dept: OBSTETRICS AND GYNECOLOGY | Facility: CLINIC | Age: 27
End: 2022-05-03
Payer: COMMERCIAL

## 2022-05-25 ENCOUNTER — ROUTINE PRENATAL (OUTPATIENT)
Dept: OBSTETRICS AND GYNECOLOGY | Facility: CLINIC | Age: 27
End: 2022-05-25
Payer: COMMERCIAL

## 2022-05-25 VITALS
WEIGHT: 172.19 LBS | BODY MASS INDEX: 29.56 KG/M2 | DIASTOLIC BLOOD PRESSURE: 78 MMHG | SYSTOLIC BLOOD PRESSURE: 120 MMHG

## 2022-05-25 DIAGNOSIS — O09.299 HISTORY OF MISCARRIAGE, CURRENTLY PREGNANT: Primary | ICD-10-CM

## 2022-05-25 DIAGNOSIS — R82.998 LEUKOCYTES IN URINE: ICD-10-CM

## 2022-05-25 DIAGNOSIS — Z3A.10 10 WEEKS GESTATION OF PREGNANCY: ICD-10-CM

## 2022-05-25 LAB
BILIRUB SERPL-MCNC: NEGATIVE MG/DL
BLOOD URINE, POC: NORMAL
COLOR, POC UA: NORMAL
GLUCOSE UR QL STRIP: NEGATIVE
KETONES UR QL STRIP: NEGATIVE
LEUKOCYTE ESTERASE URINE, POC: NORMAL
NITRITE, POC UA: NEGATIVE
PH, POC UA: 5
PROTEIN, POC: NEGATIVE
SPECIFIC GRAVITY, POC UA: 1.01
UROBILINOGEN, POC UA: 0.2

## 2022-05-25 PROCEDURE — 87086 URINE CULTURE/COLONY COUNT: CPT | Mod: ,,, | Performed by: CLINICAL MEDICAL LABORATORY

## 2022-05-25 PROCEDURE — 87086 CULTURE, URINE: ICD-10-PCS | Mod: ,,, | Performed by: CLINICAL MEDICAL LABORATORY

## 2022-05-25 PROCEDURE — 99213 OFFICE O/P EST LOW 20 MIN: CPT | Mod: PBBFAC | Performed by: STUDENT IN AN ORGANIZED HEALTH CARE EDUCATION/TRAINING PROGRAM

## 2022-05-25 PROCEDURE — 81003 URINALYSIS AUTO W/O SCOPE: CPT | Mod: PBBFAC | Performed by: STUDENT IN AN ORGANIZED HEALTH CARE EDUCATION/TRAINING PROGRAM

## 2022-05-25 PROCEDURE — 0502F PR SUBSEQUENT PRENATAL CARE: ICD-10-PCS | Mod: CPTII,,, | Performed by: STUDENT IN AN ORGANIZED HEALTH CARE EDUCATION/TRAINING PROGRAM

## 2022-05-25 PROCEDURE — 0502F SUBSEQUENT PRENATAL CARE: CPT | Mod: CPTII,,, | Performed by: STUDENT IN AN ORGANIZED HEALTH CARE EDUCATION/TRAINING PROGRAM

## 2022-05-25 NOTE — PROGRESS NOTES
Return OB Office Visit    CC:   Chief Complaint   Patient presents with    Routine Prenatal Visit       Assessment/Plan:  26 y.o.  at 10w6d (Estimated Date of Delivery: 12/15/22) presents for SRINATH appointment:    Problem List Items Addressed This Visit        Obstetric    10 weeks gestation of pregnancy    Overview     Estimated Date of Delivery: 12/15/22 established by LMP, early US consistent    First Trimester  - Prenatal Battery: WNL  - UDS: neg  - Pap Smear: NILM 10/2020  - GC/C: neg  - NIPT pending      Second Trimester  - Quad: Plan to offer at 15-19wks.  - Anatomy Ultrasound: Will schedule for 20wks    Third Trimester  - 28 wk labs:   - Rh Status: A pos  - 1Hr GCT:   - GC/C:   - GBS:   - EFW/CHANTAL:    Vaccines  - Influenza:  - TDAP: Plan to give around 32wks  - COVID:     Contraception:           Relevant Orders    POCT URINALYSIS W/O SCOPE (Completed)    History of miscarriage, currently pregnant - Primary    Overview     Nonviable pregnancy with G1  Required SDC  Progesterone level ordered  Will supplement progesterone until 12 weeks             Other Visit Diagnoses     Leukocytes in urine        Relevant Orders    Urine culture          HPI:  Pt seen and examined.  No concerns/complaints.  Denies VB, LOF, ctx.      Objective:  /78   Wt 78.1 kg (172 lb 3.2 oz)   LMP  (Exact Date)   BMI 29.56 kg/m²   Gen: AO, NAD  Abd: Soft, NT, gravid measuring below umbilicus  Ext: Bilateral trace LE edema.  No calf tenderness. No erythema.  OMM: Increased lumbar lordosis

## 2022-05-27 LAB — UA COMPLETE W REFLEX CULTURE PNL UR: NORMAL

## 2022-06-30 ENCOUNTER — ROUTINE PRENATAL (OUTPATIENT)
Dept: OBSTETRICS AND GYNECOLOGY | Facility: CLINIC | Age: 27
End: 2022-06-30
Payer: COMMERCIAL

## 2022-06-30 VITALS — BODY MASS INDEX: 29.87 KG/M2 | SYSTOLIC BLOOD PRESSURE: 130 MMHG | WEIGHT: 174 LBS | DIASTOLIC BLOOD PRESSURE: 76 MMHG

## 2022-06-30 DIAGNOSIS — Z3A.16 16 WEEKS GESTATION OF PREGNANCY: ICD-10-CM

## 2022-06-30 DIAGNOSIS — O09.299 HISTORY OF MISCARRIAGE, CURRENTLY PREGNANT: Primary | ICD-10-CM

## 2022-06-30 LAB
BILIRUB SERPL-MCNC: NEGATIVE MG/DL
BLOOD URINE, POC: NEGATIVE
COLOR, POC UA: NORMAL
GLUCOSE UR QL STRIP: NEGATIVE
KETONES UR QL STRIP: NEGATIVE
LEUKOCYTE ESTERASE URINE, POC: NEGATIVE
NITRITE, POC UA: NEGATIVE
PH, POC UA: 7
PROTEIN, POC: NEGATIVE
SPECIFIC GRAVITY, POC UA: 1.01
UROBILINOGEN, POC UA: NEGATIVE

## 2022-06-30 PROCEDURE — 99213 OFFICE O/P EST LOW 20 MIN: CPT | Mod: PBBFAC | Performed by: STUDENT IN AN ORGANIZED HEALTH CARE EDUCATION/TRAINING PROGRAM

## 2022-06-30 PROCEDURE — 0502F SUBSEQUENT PRENATAL CARE: CPT | Mod: CPTII,,, | Performed by: STUDENT IN AN ORGANIZED HEALTH CARE EDUCATION/TRAINING PROGRAM

## 2022-06-30 PROCEDURE — 0502F PR SUBSEQUENT PRENATAL CARE: ICD-10-PCS | Mod: CPTII,,, | Performed by: STUDENT IN AN ORGANIZED HEALTH CARE EDUCATION/TRAINING PROGRAM

## 2022-06-30 PROCEDURE — 81003 URINALYSIS AUTO W/O SCOPE: CPT | Mod: PBBFAC | Performed by: STUDENT IN AN ORGANIZED HEALTH CARE EDUCATION/TRAINING PROGRAM

## 2022-06-30 NOTE — PROGRESS NOTES
Subjective:      Lorenza Santiago is a 26 y.o. female being seen today for her obstetrical visit. She is at 16w0d gestation. Patient reports no complaints. Fetal movement: normal.    Menstrual History:  OB History        2    Para        Term                AB   1    Living           SAB   1    IAB        Ectopic        Multiple        Live Births                    Review of Systems  Pertinent items are noted in HPI.     Objective:       /76   Wt 78.9 kg (174 lb)   LMP  (Exact Date)   BMI 29.87 kg/m²   Uterine Size: size equals dates     FHTs 145    Assessment:      Pregnancy 16 and 2/7 weeks     Plan:     Quad screen ordered  NIPT negative  Anatomy US ordered  Follow up in 4 weeks.

## 2022-07-05 PROBLEM — Z3A.16 16 WEEKS GESTATION OF PREGNANCY: Status: ACTIVE | Noted: 2022-04-28

## 2022-07-05 PROBLEM — O02.89 NONVIABLE PREGNANCY: Status: RESOLVED | Noted: 2021-11-29 | Resolved: 2022-07-05

## 2022-07-05 PROBLEM — Z48.89 ENCOUNTER FOR POSTOPERATIVE CARE: Status: RESOLVED | Noted: 2021-12-08 | Resolved: 2022-07-05

## 2022-07-27 ENCOUNTER — ROUTINE PRENATAL (OUTPATIENT)
Dept: OBSTETRICS AND GYNECOLOGY | Facility: CLINIC | Age: 27
End: 2022-07-27
Payer: COMMERCIAL

## 2022-07-27 ENCOUNTER — HOSPITAL ENCOUNTER (OUTPATIENT)
Dept: RADIOLOGY | Facility: HOSPITAL | Age: 27
Discharge: HOME | End: 2022-07-27
Attending: STUDENT IN AN ORGANIZED HEALTH CARE EDUCATION/TRAINING PROGRAM
Payer: COMMERCIAL

## 2022-07-27 VITALS — SYSTOLIC BLOOD PRESSURE: 122 MMHG | DIASTOLIC BLOOD PRESSURE: 70 MMHG | BODY MASS INDEX: 30.04 KG/M2 | WEIGHT: 175 LBS

## 2022-07-27 DIAGNOSIS — Z34.02 ENCOUNTER FOR SUPERVISION OF NORMAL FIRST PREGNANCY IN SECOND TRIMESTER: Primary | ICD-10-CM

## 2022-07-27 DIAGNOSIS — O09.299 HISTORY OF MISCARRIAGE, CURRENTLY PREGNANT: ICD-10-CM

## 2022-07-27 DIAGNOSIS — Z3A.19 19 WEEKS GESTATION OF PREGNANCY: ICD-10-CM

## 2022-07-27 DIAGNOSIS — Z3A.16 16 WEEKS GESTATION OF PREGNANCY: ICD-10-CM

## 2022-07-27 LAB
BILIRUB SERPL-MCNC: NORMAL MG/DL
BLOOD URINE, POC: NORMAL
COLOR, POC UA: YELLOW
GLUCOSE UR QL STRIP: NORMAL
KETONES UR QL STRIP: NORMAL
LEUKOCYTE ESTERASE URINE, POC: NORMAL
NITRITE, POC UA: NORMAL
PH, POC UA: 7.5
PROTEIN, POC: NORMAL
SPECIFIC GRAVITY, POC UA: 1.01
UROBILINOGEN, POC UA: 0.2

## 2022-07-27 PROCEDURE — 99213 OFFICE O/P EST LOW 20 MIN: CPT | Mod: PBBFAC,25 | Performed by: STUDENT IN AN ORGANIZED HEALTH CARE EDUCATION/TRAINING PROGRAM

## 2022-07-27 PROCEDURE — 76805 OB US >/= 14 WKS SNGL FETUS: CPT | Mod: 26,,, | Performed by: RADIOLOGY

## 2022-07-27 PROCEDURE — 76805 OB US >/= 14 WKS SNGL FETUS: CPT | Mod: TC

## 2022-07-27 PROCEDURE — 76805 US OB 14+ WEEKS, TRANSABDOM, SINGLE GESTATION: ICD-10-PCS | Mod: 26,,, | Performed by: RADIOLOGY

## 2022-07-27 PROCEDURE — 0502F PR SUBSEQUENT PRENATAL CARE: ICD-10-PCS | Mod: CPTII,,, | Performed by: STUDENT IN AN ORGANIZED HEALTH CARE EDUCATION/TRAINING PROGRAM

## 2022-07-27 PROCEDURE — 81003 URINALYSIS AUTO W/O SCOPE: CPT | Mod: PBBFAC | Performed by: STUDENT IN AN ORGANIZED HEALTH CARE EDUCATION/TRAINING PROGRAM

## 2022-07-27 PROCEDURE — 0502F SUBSEQUENT PRENATAL CARE: CPT | Mod: CPTII,,, | Performed by: STUDENT IN AN ORGANIZED HEALTH CARE EDUCATION/TRAINING PROGRAM

## 2022-07-27 NOTE — PROGRESS NOTES
Subjective:      Lorenza Santiago is a 26 y.o. female being seen today for her obstetrical visit. She is at 19w6d gestation. Patient reports no complaints. Fetal movement: normal.    Menstrual History:  OB History        2    Para        Term                AB   1    Living           SAB   1    IAB        Ectopic        Multiple        Live Births                   The following portions of the patient's history were reviewed and updated as appropriate: allergies, current medications, past family history, past medical history, past social history, past surgical history and problem list.    Review of Systems  Pertinent items are noted in HPI.     Objective:      /70   Wt 79.4 kg (175 lb)   LMP  (Exact Date)   BMI 30.04 kg/m²   FHT: 141 BPM   Uterine Size: 20 cm        Assessment:      Pregnancy 19 and 6/7 weeks     Plan:     Anatomy US reviewed  Follow up in 4 weeks.

## 2022-08-09 ENCOUNTER — PATIENT MESSAGE (OUTPATIENT)
Dept: OBSTETRICS AND GYNECOLOGY | Facility: CLINIC | Age: 27
End: 2022-08-09
Payer: COMMERCIAL

## 2022-08-24 ENCOUNTER — ROUTINE PRENATAL (OUTPATIENT)
Dept: OBSTETRICS AND GYNECOLOGY | Facility: CLINIC | Age: 27
End: 2022-08-24
Payer: COMMERCIAL

## 2022-08-24 VITALS
SYSTOLIC BLOOD PRESSURE: 108 MMHG | BODY MASS INDEX: 30.86 KG/M2 | DIASTOLIC BLOOD PRESSURE: 62 MMHG | WEIGHT: 179.81 LBS

## 2022-08-24 DIAGNOSIS — Z3A.24 24 WEEKS GESTATION OF PREGNANCY: ICD-10-CM

## 2022-08-24 DIAGNOSIS — Z3A.28 28 WEEKS GESTATION OF PREGNANCY: ICD-10-CM

## 2022-08-24 DIAGNOSIS — Z34.02 ENCOUNTER FOR SUPERVISION OF NORMAL FIRST PREGNANCY IN SECOND TRIMESTER: Primary | ICD-10-CM

## 2022-08-24 LAB
BILIRUB SERPL-MCNC: NEGATIVE MG/DL
BLOOD URINE, POC: NEGATIVE
COLOR, POC UA: NORMAL
GLUCOSE UR QL STRIP: NEGATIVE
KETONES UR QL STRIP: NEGATIVE
LEUKOCYTE ESTERASE URINE, POC: NORMAL
NITRITE, POC UA: NEGATIVE
PH, POC UA: 8
PROTEIN, POC: NEGATIVE
SPECIFIC GRAVITY, POC UA: 1.01
UROBILINOGEN, POC UA: 0.2

## 2022-08-24 PROCEDURE — 0502F SUBSEQUENT PRENATAL CARE: CPT | Mod: ,,, | Performed by: STUDENT IN AN ORGANIZED HEALTH CARE EDUCATION/TRAINING PROGRAM

## 2022-08-24 PROCEDURE — 99213 OFFICE O/P EST LOW 20 MIN: CPT | Mod: PBBFAC | Performed by: STUDENT IN AN ORGANIZED HEALTH CARE EDUCATION/TRAINING PROGRAM

## 2022-08-24 PROCEDURE — 81003 URINALYSIS AUTO W/O SCOPE: CPT | Mod: PBBFAC | Performed by: STUDENT IN AN ORGANIZED HEALTH CARE EDUCATION/TRAINING PROGRAM

## 2022-08-24 PROCEDURE — 0502F PR SUBSEQUENT PRENATAL CARE: ICD-10-PCS | Mod: ,,, | Performed by: STUDENT IN AN ORGANIZED HEALTH CARE EDUCATION/TRAINING PROGRAM

## 2022-08-24 NOTE — PROGRESS NOTES
Subjective:      Lorenza Santiago is a 26 y.o. female being seen today for her obstetrical visit. She is at 23w6d gestation. Patient reports no complaints. Fetal movement: normal.    Menstrual History:  OB History          2    Para        Term                AB   1    Living             SAB   1    IAB        Ectopic        Multiple        Live Births                    The following portions of the patient's history were reviewed and updated as appropriate: allergies, current medications, past family history, past medical history, past social history, past surgical history and problem list.    Review of Systems  Pertinent items are noted in HPI.     Objective:      /62   Wt 81.6 kg (179 lb 12.8 oz)   LMP  (Exact Date)   BMI 30.86 kg/m²   FHT: 150 BPM   Uterine Size: 24 cm        Assessment:      Pregnancy 23 and 6/7 weeks     Plan:      28 week labs ordered  Follow up in 4 weeks.

## 2022-09-08 ENCOUNTER — PATIENT MESSAGE (OUTPATIENT)
Dept: OBSTETRICS AND GYNECOLOGY | Facility: CLINIC | Age: 27
End: 2022-09-08
Payer: COMMERCIAL

## 2022-09-21 ENCOUNTER — HOSPITAL ENCOUNTER (OUTPATIENT)
Dept: RADIOLOGY | Facility: HOSPITAL | Age: 27
Discharge: HOME | End: 2022-09-21
Attending: STUDENT IN AN ORGANIZED HEALTH CARE EDUCATION/TRAINING PROGRAM
Payer: COMMERCIAL

## 2022-09-21 ENCOUNTER — ROUTINE PRENATAL (OUTPATIENT)
Dept: OBSTETRICS AND GYNECOLOGY | Facility: CLINIC | Age: 27
End: 2022-09-21
Payer: COMMERCIAL

## 2022-09-21 VITALS — WEIGHT: 180 LBS | DIASTOLIC BLOOD PRESSURE: 70 MMHG | BODY MASS INDEX: 30.9 KG/M2 | SYSTOLIC BLOOD PRESSURE: 110 MMHG

## 2022-09-21 DIAGNOSIS — Z3A.27 27 WEEKS GESTATION OF PREGNANCY: ICD-10-CM

## 2022-09-21 DIAGNOSIS — O09.299 HISTORY OF MISCARRIAGE, CURRENTLY PREGNANT: ICD-10-CM

## 2022-09-21 DIAGNOSIS — Z34.02 ENCOUNTER FOR SUPERVISION OF NORMAL FIRST PREGNANCY IN SECOND TRIMESTER: Primary | ICD-10-CM

## 2022-09-21 LAB
BILIRUB SERPL-MCNC: NEGATIVE MG/DL
BLOOD URINE, POC: NEGATIVE
COLOR, POC UA: YELLOW
GLUCOSE UR QL STRIP: NEGATIVE
KETONES UR QL STRIP: NEGATIVE
LEUKOCYTE ESTERASE URINE, POC: NEGATIVE
NITRITE, POC UA: NEGATIVE
PH, POC UA: 7.5
PROTEIN, POC: NEGATIVE
SPECIFIC GRAVITY, POC UA: 1
UROBILINOGEN, POC UA: 0.2

## 2022-09-21 PROCEDURE — 0502F PR SUBSEQUENT PRENATAL CARE: ICD-10-PCS | Mod: ,,, | Performed by: STUDENT IN AN ORGANIZED HEALTH CARE EDUCATION/TRAINING PROGRAM

## 2022-09-21 PROCEDURE — 0502F SUBSEQUENT PRENATAL CARE: CPT | Mod: ,,, | Performed by: STUDENT IN AN ORGANIZED HEALTH CARE EDUCATION/TRAINING PROGRAM

## 2022-09-21 PROCEDURE — 76816 OB US FOLLOW-UP PER FETUS: CPT | Mod: TC

## 2022-09-21 PROCEDURE — 81003 URINALYSIS AUTO W/O SCOPE: CPT | Mod: PBBFAC | Performed by: STUDENT IN AN ORGANIZED HEALTH CARE EDUCATION/TRAINING PROGRAM

## 2022-09-21 PROCEDURE — 99213 OFFICE O/P EST LOW 20 MIN: CPT | Mod: PBBFAC | Performed by: STUDENT IN AN ORGANIZED HEALTH CARE EDUCATION/TRAINING PROGRAM

## 2022-09-21 PROCEDURE — 76816 OB US FOLLOW-UP PER FETUS: CPT | Mod: 26,,, | Performed by: RADIOLOGY

## 2022-09-21 PROCEDURE — 76816 US OB FOLLOW UP EA GESTATION TRANSABDOMINAL: ICD-10-PCS | Mod: 26,,, | Performed by: RADIOLOGY

## 2022-09-21 NOTE — PROGRESS NOTES
Subjective:      Lorenza Santiago is a 27 y.o. female being seen today for her obstetrical visit. She is at 27w6d gestation. Patient reports no bleeding, no contractions, and no leaking. Fetal movement: normal.    Menstrual History:  OB History          2    Para        Term                AB   1    Living             SAB   1    IAB        Ectopic        Multiple        Live Births                      The following portions of the patient's history were reviewed and updated as appropriate: allergies, current medications, past family history, past medical history, past social history, past surgical history, and problem list.    Review of Systems  Pertinent items are noted in HPI.     Objective:      /70   Wt 81.6 kg (180 lb)   LMP  (Exact Date)   BMI 30.90 kg/m²   FHT: 147 BPM   Uterine Size: 28 cm        Assessment:      Pregnancy 27 and 6/7 weeks     Plan:      OBGCT: ordered.  Follow up in 2 weeks.

## 2022-09-22 ENCOUNTER — HOSPITAL ENCOUNTER (OUTPATIENT)
Facility: HOSPITAL | Age: 27
Discharge: HOME | End: 2022-09-22
Attending: OBSTETRICS & GYNECOLOGY | Admitting: OBSTETRICS & GYNECOLOGY
Payer: COMMERCIAL

## 2022-09-22 VITALS
TEMPERATURE: 97 F | SYSTOLIC BLOOD PRESSURE: 121 MMHG | HEART RATE: 74 BPM | DIASTOLIC BLOOD PRESSURE: 65 MMHG | RESPIRATION RATE: 18 BRPM

## 2022-09-22 DIAGNOSIS — Z34.90 PREGNANCY: ICD-10-CM

## 2022-09-22 PROBLEM — O36.8130 DECREASED FETAL MOVEMENT AFFECTING MANAGEMENT OF PREGNANCY IN THIRD TRIMESTER: Status: ACTIVE | Noted: 2022-09-22

## 2022-09-22 PROBLEM — Z3A.28 28 WEEKS GESTATION OF PREGNANCY: Status: ACTIVE | Noted: 2022-04-28

## 2022-09-22 LAB
BACTERIA #/AREA URNS HPF: ABNORMAL /HPF
BILIRUB UR QL STRIP: NEGATIVE
CLARITY UR: ABNORMAL
COLOR UR: ABNORMAL
GLUCOSE UR STRIP-MCNC: NORMAL MG/DL
KETONES UR STRIP-SCNC: NEGATIVE MG/DL
LEUKOCYTE ESTERASE UR QL STRIP: ABNORMAL
MUCOUS THREADS #/AREA URNS HPF: ABNORMAL /HPF
NITRITE UR QL STRIP: NEGATIVE
PH UR STRIP: 7 PH UNITS
PROT UR QL STRIP: 10
RBC # UR STRIP: NEGATIVE /UL
RBC #/AREA URNS HPF: ABNORMAL /HPF
SP GR UR STRIP: 1.01
SQUAMOUS #/AREA URNS LPF: ABNORMAL /LPF
TRICHOMONAS #/AREA URNS HPF: ABNORMAL /HPF
UROBILINOGEN UR STRIP-ACNC: NORMAL MG/DL
WBC #/AREA URNS HPF: ABNORMAL /HPF
YEAST #/AREA URNS HPF: ABNORMAL /HPF

## 2022-09-22 PROCEDURE — 81001 URINALYSIS AUTO W/SCOPE: CPT | Performed by: OBSTETRICS & GYNECOLOGY

## 2022-09-22 PROCEDURE — 99211 OFF/OP EST MAY X REQ PHY/QHP: CPT

## 2022-09-22 PROCEDURE — 87086 URINE CULTURE/COLONY COUNT: CPT | Performed by: OBSTETRICS & GYNECOLOGY

## 2022-09-22 PROCEDURE — 59025 FETAL NON-STRESS TEST: CPT

## 2022-09-22 NOTE — PROGRESS NOTES
[facility] -  Labor and Delivery  Obstetrics  Antepartum Progress Note    Patient Name: Lorenza Santiago  MRN: [id number]  Admission Date: 2022  Hospital Length of Stay: 0 days  Attending Physician: Isaias Ware MD  Primary Care Provider: ANIRUDH Roy    Subjective:     Principal Problem:Decreased fetal movement affecting management of pregnancy in third trimester    HPI:  Patient is a 27-year-old  2 para 0 female who presents at 28 weeks and 0 days with decreased fetal movement today.  She denies uterine contractions loss of amniotic fluid vaginal bleeding headaches blurred vision nausea vomiting diarrhea constipation or abdominal trauma.  She receives prenatal care with Dr. Beltran and has had an unremarkable prenatal course thus far.  The patient admitted that fetal activity increased significantly on the way up to Labor and delivery.    Obstetric HPI:  Patient reports None contractions, active fetal movement, absent vaginal bleeding , absent loss of fluid      Objective:     Vital Signs (Most Recent):    Vital Signs (24h Range):           There is no height or weight on file to calculate BMI.    FHT: 140Cat 1 (reassuring)  TOCO:  No ctxs    No intake or output data in the 24 hours ending 22 1702    Physical Exam:   Constitutional: She appears well-developed and well-nourished. No distress.    HENT:   Head: Normocephalic and atraumatic.      Cardiovascular:  Normal rate and regular rhythm.             Pulmonary/Chest: Effort normal. No respiratory distress.        Abdominal: Soft.             Musculoskeletal: Moves all extremeties.       Neurological: She is alert.     Psychiatric: She has a normal mood and affect. Her behavior is normal.     Cervical Exam:  deferred       Significant Labs:  Recent Lab Results         22  1559        Appearance, UA Slightly Cloudy       Bacteria, UA Many       Bilirubin (UA) Negative       Color, UA Light Yellow       Glucose, UA  Normal       Ketones, UA Negative       Leukocytes, UA Large       Mucus, UA None Seen       NITRITE UA Negative       Occult Blood UA Negative       pH, UA 7.0       Protein, UA 10       RBC, UA None Seen       Specific Bear, UA 1.011       Squam Epithel, UA Moderate       Trichomonas, UA None Seen       UROBILINOGEN UA Normal       WBC, UA 20-50       Yeast, UA None Seen               Assessment/Plan:     27 y.o. female  at 28w0d for:    Active Diagnoses:    Diagnosis Date Noted POA    PRINCIPAL PROBLEM:  Decreased fetal movement affecting management of pregnancy in third trimester [O36.8130] 2022 Yes    History of miscarriage, currently pregnant [O09.299] 2022 Not Applicable    28 weeks gestation of pregnancy [Z3A.28] 2022 Not Applicable      Problems Resolved During this Admission:     Reactive NST is reassuring.  Patient is to follow-up with Dr. Beltran at her next regularly scheduled appointment.      Isaias Ware MD  Obstetrics  [facility] -  Labor and Delivery

## 2022-09-24 LAB — UA COMPLETE W REFLEX CULTURE PNL UR: NORMAL

## 2022-09-25 ENCOUNTER — PATIENT MESSAGE (OUTPATIENT)
Dept: OBSTETRICS AND GYNECOLOGY | Facility: CLINIC | Age: 27
End: 2022-09-25
Payer: COMMERCIAL

## 2022-09-26 DIAGNOSIS — F41.9 ANXIETY IN PREGNANCY IN THIRD TRIMESTER, ANTEPARTUM: Primary | ICD-10-CM

## 2022-09-26 DIAGNOSIS — O99.343 ANXIETY IN PREGNANCY IN THIRD TRIMESTER, ANTEPARTUM: Primary | ICD-10-CM

## 2022-09-26 RX ORDER — SERTRALINE HYDROCHLORIDE 50 MG/1
50 TABLET, FILM COATED ORAL DAILY
Qty: 90 TABLET | Refills: 3 | OUTPATIENT
Start: 2022-09-26 | End: 2022-11-09

## 2022-10-05 ENCOUNTER — ROUTINE PRENATAL (OUTPATIENT)
Dept: OBSTETRICS AND GYNECOLOGY | Facility: CLINIC | Age: 27
End: 2022-10-05
Payer: COMMERCIAL

## 2022-10-05 VITALS — DIASTOLIC BLOOD PRESSURE: 82 MMHG | WEIGHT: 185 LBS | SYSTOLIC BLOOD PRESSURE: 120 MMHG | BODY MASS INDEX: 31.76 KG/M2

## 2022-10-05 DIAGNOSIS — Z3A.29 29 WEEKS GESTATION OF PREGNANCY: ICD-10-CM

## 2022-10-05 DIAGNOSIS — F41.9 ANXIETY IN PREGNANCY IN THIRD TRIMESTER, ANTEPARTUM: Primary | ICD-10-CM

## 2022-10-05 DIAGNOSIS — O99.343 ANXIETY IN PREGNANCY IN THIRD TRIMESTER, ANTEPARTUM: Primary | ICD-10-CM

## 2022-10-05 DIAGNOSIS — R82.998 LEUKOCYTES IN URINE: ICD-10-CM

## 2022-10-05 LAB
BILIRUB SERPL-MCNC: NORMAL MG/DL
BLOOD URINE, POC: NORMAL
COLOR, POC UA: NORMAL
GLUCOSE UR QL STRIP: NORMAL
KETONES UR QL STRIP: NORMAL
LEUKOCYTE ESTERASE URINE, POC: NORMAL
NITRITE, POC UA: NORMAL
PH, POC UA: 5
PROTEIN, POC: NORMAL
SPECIFIC GRAVITY, POC UA: 1
UROBILINOGEN, POC UA: 0.2

## 2022-10-05 PROCEDURE — 99213 OFFICE O/P EST LOW 20 MIN: CPT | Mod: PBBFAC | Performed by: STUDENT IN AN ORGANIZED HEALTH CARE EDUCATION/TRAINING PROGRAM

## 2022-10-05 PROCEDURE — 87086 URINE CULTURE/COLONY COUNT: CPT | Mod: ,,, | Performed by: CLINICAL MEDICAL LABORATORY

## 2022-10-05 PROCEDURE — 0502F SUBSEQUENT PRENATAL CARE: CPT | Mod: ,,, | Performed by: STUDENT IN AN ORGANIZED HEALTH CARE EDUCATION/TRAINING PROGRAM

## 2022-10-05 PROCEDURE — 87086 CULTURE, URINE: ICD-10-PCS | Mod: ,,, | Performed by: CLINICAL MEDICAL LABORATORY

## 2022-10-05 PROCEDURE — 81003 URINALYSIS AUTO W/O SCOPE: CPT | Mod: PBBFAC | Performed by: STUDENT IN AN ORGANIZED HEALTH CARE EDUCATION/TRAINING PROGRAM

## 2022-10-05 PROCEDURE — 0502F PR SUBSEQUENT PRENATAL CARE: ICD-10-PCS | Mod: ,,, | Performed by: STUDENT IN AN ORGANIZED HEALTH CARE EDUCATION/TRAINING PROGRAM

## 2022-10-06 NOTE — PROGRESS NOTES
Subjective:      Lorenza Santiago is a 27 y.o. female being seen today for her obstetrical visit. She is at 29w6d gestation. Patient reports  anxiety . Took zoloft for two days then stopped due to potential side effects that she saw on [employer]. Fetal movement: normal.    Menstrual History:  OB History          2    Para        Term                AB   1    Living             SAB   1    IAB        Ectopic        Multiple        Live Births                        The following portions of the patient's history were reviewed and updated as appropriate: allergies, current medications, past family history, past medical history, past social history, past surgical history, and problem list.    Review of Systems  Pertinent items are noted in HPI.     Objective:      /82   Wt 83.9 kg (185 lb)   LMP  (Exact Date)   BMI 31.76 kg/m²   FHT:  130 BPM   Uterine Size: 30 cm   Presentation: unsure        Assessment:      Pregnancy 29 and 2/7 weeks   Anxiety    Plan:      28-week labs reviewed, normal  Encouraged to restart zoloft  Discussed safety profile in pregnancy  Follow up in 2 Weeks.

## 2022-10-07 LAB — UA COMPLETE W REFLEX CULTURE PNL UR: NORMAL

## 2022-10-19 ENCOUNTER — ROUTINE PRENATAL (OUTPATIENT)
Dept: OBSTETRICS AND GYNECOLOGY | Facility: CLINIC | Age: 27
End: 2022-10-19
Payer: COMMERCIAL

## 2022-10-19 VITALS — WEIGHT: 187 LBS | BODY MASS INDEX: 32.1 KG/M2 | DIASTOLIC BLOOD PRESSURE: 68 MMHG | SYSTOLIC BLOOD PRESSURE: 110 MMHG

## 2022-10-19 DIAGNOSIS — O09.299 HISTORY OF MISCARRIAGE, CURRENTLY PREGNANT: Primary | ICD-10-CM

## 2022-10-19 DIAGNOSIS — Z3A.28 28 WEEKS GESTATION OF PREGNANCY: ICD-10-CM

## 2022-10-19 DIAGNOSIS — R82.998 LEUKOCYTES IN URINE: ICD-10-CM

## 2022-10-19 DIAGNOSIS — Z3A.31 31 WEEKS GESTATION OF PREGNANCY: ICD-10-CM

## 2022-10-19 DIAGNOSIS — O36.8131 DECREASED FETAL MOVEMENT AFFECTING MANAGEMENT OF PREGNANCY IN THIRD TRIMESTER, FETUS 1 OF MULTIPLE GESTATION: ICD-10-CM

## 2022-10-19 LAB
BILIRUB SERPL-MCNC: NORMAL MG/DL
BLOOD URINE, POC: NORMAL
COLOR, POC UA: NORMAL
GLUCOSE UR QL STRIP: NORMAL
KETONES UR QL STRIP: NORMAL
LEUKOCYTE ESTERASE URINE, POC: NORMAL
NITRITE, POC UA: NORMAL
PH, POC UA: 7
PROTEIN, POC: NORMAL
SPECIFIC GRAVITY, POC UA: 1.01
UROBILINOGEN, POC UA: 0.2

## 2022-10-19 PROCEDURE — 0502F SUBSEQUENT PRENATAL CARE: CPT | Mod: ,,, | Performed by: STUDENT IN AN ORGANIZED HEALTH CARE EDUCATION/TRAINING PROGRAM

## 2022-10-19 PROCEDURE — 99213 OFFICE O/P EST LOW 20 MIN: CPT | Mod: PBBFAC | Performed by: STUDENT IN AN ORGANIZED HEALTH CARE EDUCATION/TRAINING PROGRAM

## 2022-10-19 PROCEDURE — 87086 CULTURE, URINE: ICD-10-PCS | Mod: ,,, | Performed by: CLINICAL MEDICAL LABORATORY

## 2022-10-19 PROCEDURE — 87086 URINE CULTURE/COLONY COUNT: CPT | Mod: ,,, | Performed by: CLINICAL MEDICAL LABORATORY

## 2022-10-19 PROCEDURE — 0502F PR SUBSEQUENT PRENATAL CARE: ICD-10-PCS | Mod: ,,, | Performed by: STUDENT IN AN ORGANIZED HEALTH CARE EDUCATION/TRAINING PROGRAM

## 2022-10-19 PROCEDURE — 81003 URINALYSIS AUTO W/O SCOPE: CPT | Mod: PBBFAC | Performed by: STUDENT IN AN ORGANIZED HEALTH CARE EDUCATION/TRAINING PROGRAM

## 2022-10-19 NOTE — PROGRESS NOTES
Subjective:      Lorenza Santiago is a 27 y.o. female being seen today for her obstetrical visit. She is at 31w6d gestation. Patient reports no bleeding, no contractions, no cramping, and no leaking. Fetal movement: normal.    Menstrual History:  OB History          2    Para        Term                AB   1    Living             SAB   1    IAB        Ectopic        Multiple        Live Births                 The following portions of the patient's history were reviewed and updated as appropriate: allergies, current medications, past family history, past medical history, past social history, past surgical history, and problem list.    Review of Systems  Pertinent items are noted in HPI.     Objective:      /68   Wt 84.8 kg (187 lb)   LMP  (Exact Date)   BMI 32.10 kg/m²   FHT:  132 BPM   Uterine Size: 32 cm   Presentation: cephalic        Assessment:      Pregnancy 31 and 6/7 weeks     Plan:   Urine culture   28-week labs reviewed, normal    Follow up in 2 Weeks.

## 2022-10-21 LAB — UA COMPLETE W REFLEX CULTURE PNL UR: NORMAL

## 2022-10-24 ENCOUNTER — PATIENT MESSAGE (OUTPATIENT)
Dept: OBSTETRICS AND GYNECOLOGY | Facility: CLINIC | Age: 27
End: 2022-10-24
Payer: COMMERCIAL

## 2022-11-02 ENCOUNTER — ROUTINE PRENATAL (OUTPATIENT)
Dept: OBSTETRICS AND GYNECOLOGY | Facility: CLINIC | Age: 27
End: 2022-11-02
Payer: COMMERCIAL

## 2022-11-02 VITALS — BODY MASS INDEX: 32.1 KG/M2 | WEIGHT: 187 LBS | SYSTOLIC BLOOD PRESSURE: 116 MMHG | DIASTOLIC BLOOD PRESSURE: 78 MMHG

## 2022-11-02 DIAGNOSIS — F41.9 ANXIETY IN PREGNANCY IN THIRD TRIMESTER, ANTEPARTUM: Primary | ICD-10-CM

## 2022-11-02 DIAGNOSIS — O99.343 ANXIETY IN PREGNANCY IN THIRD TRIMESTER, ANTEPARTUM: Primary | ICD-10-CM

## 2022-11-02 DIAGNOSIS — R82.998 LEUKOCYTES IN URINE: ICD-10-CM

## 2022-11-02 DIAGNOSIS — O09.299 HISTORY OF MISCARRIAGE, CURRENTLY PREGNANT: ICD-10-CM

## 2022-11-02 DIAGNOSIS — Z3A.33 33 WEEKS GESTATION OF PREGNANCY: ICD-10-CM

## 2022-11-02 LAB
BILIRUB SERPL-MCNC: NEGATIVE MG/DL
BLOOD URINE, POC: NEGATIVE
COLOR, POC UA: NORMAL
GLUCOSE UR QL STRIP: NEGATIVE
KETONES UR QL STRIP: NORMAL
LEUKOCYTE ESTERASE URINE, POC: NORMAL
NITRITE, POC UA: NEGATIVE
PH, POC UA: 6
PROTEIN, POC: NEGATIVE
SPECIFIC GRAVITY, POC UA: 1.01
UROBILINOGEN, POC UA: 0.2

## 2022-11-02 PROCEDURE — 87086 CULTURE, URINE: ICD-10-PCS | Mod: ,,, | Performed by: CLINICAL MEDICAL LABORATORY

## 2022-11-02 PROCEDURE — 87086 URINE CULTURE/COLONY COUNT: CPT | Mod: ,,, | Performed by: CLINICAL MEDICAL LABORATORY

## 2022-11-02 PROCEDURE — 90715 TDAP VACCINE 7 YRS/> IM: CPT | Mod: PBBFAC | Performed by: STUDENT IN AN ORGANIZED HEALTH CARE EDUCATION/TRAINING PROGRAM

## 2022-11-02 PROCEDURE — 81003 URINALYSIS AUTO W/O SCOPE: CPT | Mod: PBBFAC | Performed by: STUDENT IN AN ORGANIZED HEALTH CARE EDUCATION/TRAINING PROGRAM

## 2022-11-02 PROCEDURE — 0502F SUBSEQUENT PRENATAL CARE: CPT | Mod: ,,, | Performed by: STUDENT IN AN ORGANIZED HEALTH CARE EDUCATION/TRAINING PROGRAM

## 2022-11-02 PROCEDURE — 0502F PR SUBSEQUENT PRENATAL CARE: ICD-10-PCS | Mod: ,,, | Performed by: STUDENT IN AN ORGANIZED HEALTH CARE EDUCATION/TRAINING PROGRAM

## 2022-11-02 PROCEDURE — 99213 OFFICE O/P EST LOW 20 MIN: CPT | Mod: PBBFAC | Performed by: STUDENT IN AN ORGANIZED HEALTH CARE EDUCATION/TRAINING PROGRAM

## 2022-11-04 LAB — UA COMPLETE W REFLEX CULTURE PNL UR: NORMAL

## 2022-11-04 NOTE — PROGRESS NOTES
Subjective:      Lorenza Santiago is a 27 y.o. female being seen today for her obstetrical visit. She is at 33w6d gestation. Patient reports no bleeding, no cramping, no leaking, and occasional contractions. Fetal movement: normal.    Menstrual History:  OB History          2    Para        Term                AB   1    Living             SAB   1    IAB        Ectopic        Multiple        Live Births                    The following portions of the patient's history were reviewed and updated as appropriate: allergies, current medications, past family history, past medical history, past social history, past surgical history, and problem list.    Review of Systems  Pertinent items are noted in HPI.     Objective:      /78   Wt 84.8 kg (187 lb)   LMP  (Exact Date)   BMI 32.10 kg/m²   FHT:  158 BPM   Uterine Size: 34 cm   Presentation: cephalic        Assessment:      Pregnancy 33 and 6/7 weeks     Plan:   Tdap given  Urine culture ordered   Continue zoloft  Follow up in 2 Weeks.

## 2022-11-09 ENCOUNTER — HOSPITAL ENCOUNTER (EMERGENCY)
Facility: HOSPITAL | Age: 27
Discharge: HOME | End: 2022-11-09
Attending: EMERGENCY MEDICINE
Payer: COMMERCIAL

## 2022-11-09 ENCOUNTER — PATIENT MESSAGE (OUTPATIENT)
Dept: OBSTETRICS AND GYNECOLOGY | Facility: CLINIC | Age: 27
End: 2022-11-09
Payer: COMMERCIAL

## 2022-11-09 VITALS
OXYGEN SATURATION: 98 % | HEART RATE: 82 BPM | TEMPERATURE: 98 F | SYSTOLIC BLOOD PRESSURE: 112 MMHG | RESPIRATION RATE: 17 BRPM | WEIGHT: 185 LBS | BODY MASS INDEX: 31.58 KG/M2 | DIASTOLIC BLOOD PRESSURE: 69 MMHG | HEIGHT: 64 IN

## 2022-11-09 DIAGNOSIS — L02.214 ABSCESS OF GROIN, LEFT: Primary | ICD-10-CM

## 2022-11-09 PROCEDURE — 10061 PR DRAIN SKIN ABSCESS COMPLIC: ICD-10-PCS | Mod: ,,, | Performed by: EMERGENCY MEDICINE

## 2022-11-09 PROCEDURE — 87070 CULTURE OTHR SPECIMN AEROBIC: CPT | Performed by: EMERGENCY MEDICINE

## 2022-11-09 PROCEDURE — 96365 THER/PROPH/DIAG IV INF INIT: CPT

## 2022-11-09 PROCEDURE — 99284 EMERGENCY DEPT VISIT MOD MDM: CPT | Mod: 25,,, | Performed by: EMERGENCY MEDICINE

## 2022-11-09 PROCEDURE — 63600175 PHARM REV CODE 636 W HCPCS: Performed by: EMERGENCY MEDICINE

## 2022-11-09 PROCEDURE — 99284 PR EMERGENCY DEPT VISIT,LEVEL IV: ICD-10-PCS | Mod: 25,,, | Performed by: EMERGENCY MEDICINE

## 2022-11-09 PROCEDURE — 99284 EMERGENCY DEPT VISIT MOD MDM: CPT

## 2022-11-09 PROCEDURE — 25000003 PHARM REV CODE 250: Performed by: EMERGENCY MEDICINE

## 2022-11-09 PROCEDURE — 10060 I&D ABSCESS SIMPLE/SINGLE: CPT

## 2022-11-09 PROCEDURE — 10061 I&D ABSCESS COMP/MULTIPLE: CPT | Mod: ,,, | Performed by: EMERGENCY MEDICINE

## 2022-11-09 RX ORDER — CEFDINIR 300 MG/1
300 CAPSULE ORAL 2 TIMES DAILY
Qty: 14 CAPSULE | Refills: 0 | Status: SHIPPED | OUTPATIENT
Start: 2022-11-09 | End: 2022-11-16

## 2022-11-09 RX ORDER — ACETAMINOPHEN 500 MG
1000 TABLET ORAL
Status: COMPLETED | OUTPATIENT
Start: 2022-11-09 | End: 2022-11-09

## 2022-11-09 RX ORDER — LIDOCAINE HYDROCHLORIDE 10 MG/ML
5 INJECTION, SOLUTION EPIDURAL; INFILTRATION; INTRACAUDAL; PERINEURAL
Status: COMPLETED | OUTPATIENT
Start: 2022-11-09 | End: 2022-11-09

## 2022-11-09 RX ORDER — LIDOCAINE AND PRILOCAINE 25; 25 MG/G; MG/G
CREAM TOPICAL
Status: COMPLETED | OUTPATIENT
Start: 2022-11-09 | End: 2022-11-09

## 2022-11-09 RX ORDER — CLINDAMYCIN HYDROCHLORIDE 150 MG/1
150 CAPSULE ORAL EVERY 6 HOURS
Status: ON HOLD | COMMUNITY
End: 2022-12-14 | Stop reason: HOSPADM

## 2022-11-09 RX ADMIN — LIDOCAINE HYDROCHLORIDE 50 MG: 10 INJECTION, SOLUTION EPIDURAL; INFILTRATION; INTRACAUDAL; PERINEURAL at 07:11

## 2022-11-09 RX ADMIN — ACETAMINOPHEN 1000 MG: 500 TABLET, FILM COATED ORAL at 07:11

## 2022-11-09 RX ADMIN — SODIUM CHLORIDE 1000 ML: 9 INJECTION, SOLUTION INTRAVENOUS at 07:11

## 2022-11-09 RX ADMIN — LIDOCAINE AND PRILOCAINE: 25; 25 CREAM TOPICAL at 07:11

## 2022-11-09 RX ADMIN — CEFTRIAXONE SODIUM 1 G: 1 INJECTION, POWDER, FOR SOLUTION INTRAMUSCULAR; INTRAVENOUS at 07:11

## 2022-11-09 NOTE — Clinical Note
"Lorenza "Lorenza Herediae" Jack was seen and treated in our emergency department on 11/9/2022.  She may return to work on 11/11/2022.  As tolerated.     If you have any questions or concerns, please don't hesitate to call.      Rip Cardenas, DO"

## 2022-11-09 NOTE — ED PROVIDER NOTES
Encounter Date: 11/9/2022       History     Chief Complaint   Patient presents with    Abscess     LEFT GROIN     Patient presents with abscess of the left groin area.  Started as an ingrown hair from shaving the area.  Start as a small red bump 5-6 days ago and has gotten gradually larger and more swollen and painful.  She started on clindamycin orally 3 days ago.  She is currently pregnant in her 3rd trimester.  No bleeding, no leaking fluid, no contractions.  Good fetal movements noted.    Review of patient's allergies indicates:  No Known Allergies  History reviewed. No pertinent past medical history.  Past Surgical History:   Procedure Laterality Date    DILATION AND CURETTAGE OF UTERUS  11/30/2022    DILATION AND CURETTAGE OF UTERUS USING SUCTION N/A 11/30/2021    Procedure: DILATION AND CURETTAGE, UTERUS, USING SUCTION;  Surgeon: Onel Beltran DO;  Location: [facility];  Service: OB/GYN;  Laterality: N/A;     Family History   Family history unknown: Yes     Social History     Tobacco Use    Smoking status: Never    Smokeless tobacco: Never   Substance Use Topics    Alcohol use: Not Currently    Drug use: Never     Review of Systems   Constitutional: Negative.    HENT: Negative.     Eyes: Negative.    Respiratory: Negative.     Cardiovascular: Negative.    Gastrointestinal: Negative.    Genitourinary: Negative.  Negative for vaginal bleeding.   Musculoskeletal: Negative.    Skin:  Positive for color change (Has a swelling tender area of the left groin area with overlying erythema.). Negative for pallor, rash and wound.   Neurological: Negative.    Hematological: Negative.    Psychiatric/Behavioral: Negative.     All other systems reviewed and are negative.    Physical Exam     Initial Vitals [11/09/22 0651]   BP Pulse Resp Temp SpO2   119/78 104 18 97.8 °F (36.6 °C) 97 %      MAP       --         Physical Exam    Nursing note and vitals reviewed.  Constitutional: She appears well-developed and  well-nourished.   HENT:   Mouth/Throat: Oropharynx is clear and moist.   Eyes: Conjunctivae are normal. Right eye exhibits no discharge. Left eye exhibits no discharge. No scleral icterus.   Neck: Neck supple.   Normal range of motion.  Cardiovascular:  Normal rate, regular rhythm and normal heart sounds.           Pulmonary/Chest: Breath sounds normal. No respiratory distress. She has no wheezes. She has no rhonchi. She has no rales.   Abdominal: Abdomen is soft. There is no abdominal tenderness.   Abdomen is gravid, size consistent with dates.  Fetal heart tones positive   Musculoskeletal:         General: No tenderness or edema. Normal range of motion.      Cervical back: Normal range of motion and neck supple.     Neurological: She is alert and oriented to person, place, and time. She has normal strength. No cranial nerve deficit. GCS score is 15. GCS eye subscore is 4. GCS verbal subscore is 5. GCS motor subscore is 6.   Skin: Skin is warm and dry. Capillary refill takes less than 2 seconds. Abscess (there is an abscess of the left groin area.  Measures approximately 3 cm x 1.5 cm.  There is central induration and overlying erythema.) noted.   Psychiatric: She has a normal mood and affect. Her behavior is normal.       Medical Screening Exam   See Full Note    ED Course   I & D - Incision and Drainage    Date/Time: 11/9/2022 7:29 AM  Location procedure was performed: [facility] EMERGENCY DEPARTMENT  Performed by: Rip Cardenas DO  Authorized by: Rip Cardenas DO   Pre-operative diagnosis: Abscess left groin  Post-operative diagnosis: Same  Consent Done: Emergent Situation  Type: abscess  Body area: anogenital (Left groin in the crural fold)  Anesthesia: local infiltration    Anesthesia:  Local Anesthetic: lidocaine 1% without epinephrine and LET (lido,epi,tetracaine)  Anesthetic total: 3 mL    Patient sedated: no  Description of findings: Abscess   Scalpel size: 11  Incision type: single  straight  Complexity: simple  Drainage: purulent  Drainage amount: copious  Wound treatment: wound left open and wound packed  Packing material: 1/4 in iodoform gauze  Complications: No  Estimated blood loss (mL): 0  Specimens: Yes (Culture of purulent drainage sent to lab.)  Implants: No  Patient tolerance: Patient tolerated the procedure well with no immediate complications  Comments: Abscess cavity irrigated with saline      Labs Reviewed   CULTURE, WOUND          Imaging Results    None          Medications   sodium chloride 0.9% bolus 1,000 mL (1,000 mLs Intravenous New Bag 11/9/22 0741)   cefTRIAXone (ROCEPHIN) 1 g in dextrose 5 % in water (D5W) 5 % 50 mL IVPB (MB+) (1 g Intravenous New Bag 11/9/22 0741)   LIDOcaine-prilocaine cream ( Topical (Top) Given 11/9/22 0713)   LIDOcaine (PF) 10 mg/ml (1%) injection 50 mg (50 mg Infiltration Given 11/9/22 0712)   acetaminophen tablet 1,000 mg (1,000 mg Oral Given 11/9/22 0736)     Medical Decision Making:   ED Management:  Incision and drainage of abscess performed, culture obtained.  Patient was given IV fluids and IV Rocephin.                 Clinical Impression:   Final diagnoses:  [L02.214] Abscess of groin, left (Primary)      ED Disposition Condition    Discharge Stable          ED Prescriptions       Medication Sig Dispense Start Date End Date Auth. Provider    cefdinir (OMNICEF) 300 MG capsule Take 1 capsule (300 mg total) by mouth 2 (two) times daily. for 7 days 14 capsule 11/9/2022 11/16/2022 Rip Cardenas DO          Follow-up Information       Follow up With Specialties Details Why Contact Info    Onel Beltran DO Obstetrics and Gynecology Schedule an appointment as soon as possible for a visit in 2 days To recheck and have packing changed every 2 days. [address] [place] [id number]  [phone]               Rip Cardenas DO  11/09/22 6417

## 2022-11-11 ENCOUNTER — TELEPHONE (OUTPATIENT)
Dept: EMERGENCY MEDICINE | Facility: HOSPITAL | Age: 27
End: 2022-11-11

## 2022-11-11 LAB — MICROORGANISM SPEC CULT: ABNORMAL

## 2022-11-16 ENCOUNTER — ROUTINE PRENATAL (OUTPATIENT)
Dept: OBSTETRICS AND GYNECOLOGY | Facility: CLINIC | Age: 27
End: 2022-11-16
Payer: COMMERCIAL

## 2022-11-16 VITALS
SYSTOLIC BLOOD PRESSURE: 120 MMHG | DIASTOLIC BLOOD PRESSURE: 72 MMHG | BODY MASS INDEX: 32.24 KG/M2 | WEIGHT: 187.81 LBS

## 2022-11-16 DIAGNOSIS — F41.9 ANXIETY IN PREGNANCY IN THIRD TRIMESTER, ANTEPARTUM: Primary | ICD-10-CM

## 2022-11-16 DIAGNOSIS — O99.343 ANXIETY IN PREGNANCY IN THIRD TRIMESTER, ANTEPARTUM: Primary | ICD-10-CM

## 2022-11-16 DIAGNOSIS — Z3A.35 35 WEEKS GESTATION OF PREGNANCY: ICD-10-CM

## 2022-11-16 PROCEDURE — 87653 CULTURE, GROUP B STREP: ICD-10-PCS | Mod: ,,, | Performed by: CLINICAL MEDICAL LABORATORY

## 2022-11-16 PROCEDURE — 87591 CHLAMYDIA/GONORRHOEAE(GC), PCR: ICD-10-PCS | Mod: ,,, | Performed by: CLINICAL MEDICAL LABORATORY

## 2022-11-16 PROCEDURE — 87653 STREP B DNA AMP PROBE: CPT | Mod: ,,, | Performed by: CLINICAL MEDICAL LABORATORY

## 2022-11-16 PROCEDURE — 81003 URINALYSIS AUTO W/O SCOPE: CPT | Mod: PBBFAC | Performed by: STUDENT IN AN ORGANIZED HEALTH CARE EDUCATION/TRAINING PROGRAM

## 2022-11-16 PROCEDURE — 87491 CHLMYD TRACH DNA AMP PROBE: CPT | Mod: ,,, | Performed by: CLINICAL MEDICAL LABORATORY

## 2022-11-16 PROCEDURE — 87491 CHLAMYDIA/GONORRHOEAE(GC), PCR: ICD-10-PCS | Mod: ,,, | Performed by: CLINICAL MEDICAL LABORATORY

## 2022-11-16 PROCEDURE — 99213 OFFICE O/P EST LOW 20 MIN: CPT | Mod: PBBFAC | Performed by: STUDENT IN AN ORGANIZED HEALTH CARE EDUCATION/TRAINING PROGRAM

## 2022-11-16 PROCEDURE — 0502F PR SUBSEQUENT PRENATAL CARE: ICD-10-PCS | Mod: ,,, | Performed by: STUDENT IN AN ORGANIZED HEALTH CARE EDUCATION/TRAINING PROGRAM

## 2022-11-16 PROCEDURE — 87591 N.GONORRHOEAE DNA AMP PROB: CPT | Mod: ,,, | Performed by: CLINICAL MEDICAL LABORATORY

## 2022-11-16 PROCEDURE — 0502F SUBSEQUENT PRENATAL CARE: CPT | Mod: ,,, | Performed by: STUDENT IN AN ORGANIZED HEALTH CARE EDUCATION/TRAINING PROGRAM

## 2022-11-16 NOTE — PROGRESS NOTES
Subjective:      Lorenza Santiago is a 27 y.o. female being seen today for her obstetrical visit. She is at 35w6d gestation. Patient reports groinno bleeding, no contractions, no leaking, and abscess that is healing . Fetal movement: normal.    Menstrual History:  OB History          2    Para        Term                AB   1    Living             SAB   1    IAB        Ectopic        Multiple        Live Births                        The following portions of the patient's history were reviewed and updated as appropriate: allergies, current medications, past family history, past medical history, past social history, past surgical history, and problem list.    Review of Systems  Pertinent items are noted in HPI.     Objective:      /72   Wt 85.2 kg (187 lb 12.8 oz)   LMP  (Exact Date)   BMI 32.24 kg/m²   FHT:  145 BPM   Uterine Size: 35 cm   Presentation: unsure     Abscess in left inguinal fold healing well    Assessment:      Pregnancy 35 and 6/7 weeks     Plan:     EFW/CHANTAL ordered.  GC/C, GBS pending  Follow up in 1 Week.

## 2022-11-17 LAB
CHLAMYDIA BY PCR: NEGATIVE
N. GONORRHOEAE (GC) BY PCR: NEGATIVE

## 2022-11-18 LAB — CULTURE, GROUP B STREP: NORMAL

## 2022-11-21 ENCOUNTER — HOSPITAL ENCOUNTER (OUTPATIENT)
Dept: RADIOLOGY | Facility: HOSPITAL | Age: 27
Discharge: HOME | End: 2022-11-21
Attending: STUDENT IN AN ORGANIZED HEALTH CARE EDUCATION/TRAINING PROGRAM
Payer: COMMERCIAL

## 2022-11-21 ENCOUNTER — ROUTINE PRENATAL (OUTPATIENT)
Dept: OBSTETRICS AND GYNECOLOGY | Facility: CLINIC | Age: 27
End: 2022-11-21
Payer: COMMERCIAL

## 2022-11-21 ENCOUNTER — PATIENT MESSAGE (OUTPATIENT)
Dept: OBSTETRICS AND GYNECOLOGY | Facility: CLINIC | Age: 27
End: 2022-11-21
Payer: COMMERCIAL

## 2022-11-21 VITALS
WEIGHT: 188.38 LBS | BODY MASS INDEX: 32.34 KG/M2 | SYSTOLIC BLOOD PRESSURE: 120 MMHG | DIASTOLIC BLOOD PRESSURE: 70 MMHG

## 2022-11-21 DIAGNOSIS — Z3A.36 36 WEEKS GESTATION OF PREGNANCY: ICD-10-CM

## 2022-11-21 DIAGNOSIS — O99.343 ANXIETY IN PREGNANCY IN THIRD TRIMESTER, ANTEPARTUM: Primary | ICD-10-CM

## 2022-11-21 DIAGNOSIS — Z3A.35 35 WEEKS GESTATION OF PREGNANCY: ICD-10-CM

## 2022-11-21 DIAGNOSIS — F41.9 ANXIETY IN PREGNANCY IN THIRD TRIMESTER, ANTEPARTUM: Primary | ICD-10-CM

## 2022-11-21 LAB
BILIRUB SERPL-MCNC: NORMAL MG/DL
BLOOD URINE, POC: NORMAL
COLOR, POC UA: NORMAL
GLUCOSE UR QL STRIP: NORMAL
KETONES UR QL STRIP: NORMAL
LEUKOCYTE ESTERASE URINE, POC: NORMAL
NITRITE, POC UA: NORMAL
PH, POC UA: 7.5
PROTEIN, POC: NORMAL
SPECIFIC GRAVITY, POC UA: 1
UROBILINOGEN, POC UA: 0.2

## 2022-11-21 PROCEDURE — 0502F SUBSEQUENT PRENATAL CARE: CPT | Mod: ,,, | Performed by: STUDENT IN AN ORGANIZED HEALTH CARE EDUCATION/TRAINING PROGRAM

## 2022-11-21 PROCEDURE — 76816 US OB FOLLOW UP EA GESTATION TRANSABDOMINAL: ICD-10-PCS | Mod: 26,,, | Performed by: STUDENT IN AN ORGANIZED HEALTH CARE EDUCATION/TRAINING PROGRAM

## 2022-11-21 PROCEDURE — 0502F PR SUBSEQUENT PRENATAL CARE: ICD-10-PCS | Mod: ,,, | Performed by: STUDENT IN AN ORGANIZED HEALTH CARE EDUCATION/TRAINING PROGRAM

## 2022-11-21 PROCEDURE — 76816 OB US FOLLOW-UP PER FETUS: CPT | Mod: TC

## 2022-11-21 PROCEDURE — 81003 URINALYSIS AUTO W/O SCOPE: CPT | Mod: PBBFAC | Performed by: STUDENT IN AN ORGANIZED HEALTH CARE EDUCATION/TRAINING PROGRAM

## 2022-11-21 PROCEDURE — 99213 OFFICE O/P EST LOW 20 MIN: CPT | Mod: PBBFAC | Performed by: STUDENT IN AN ORGANIZED HEALTH CARE EDUCATION/TRAINING PROGRAM

## 2022-11-21 PROCEDURE — 76816 OB US FOLLOW-UP PER FETUS: CPT | Mod: 26,,, | Performed by: STUDENT IN AN ORGANIZED HEALTH CARE EDUCATION/TRAINING PROGRAM

## 2022-11-21 NOTE — PROGRESS NOTES
Subjective:      Loernza Santiago is a 27 y.o. female being seen today for her obstetrical visit. She is at 36w4d gestation. Patient reports no complaints. Fetal movement: normal.    Menstrual History:  OB History          2    Para        Term                AB   1    Living             SAB   1    IAB        Ectopic        Multiple        Live Births                           The following portions of the patient's history were reviewed and updated as appropriate: allergies, current medications, past family history, past medical history, past social history, past surgical history, and problem list.    Review of Systems  Pertinent items are noted in HPI.     Objective:      /70   Wt 85.5 kg (188 lb 6.4 oz)   LMP  (Exact Date)   BMI 32.34 kg/m²   FHT:  134 BPM   Uterine Size: 36 cm   Presentation: cephalic        Assessment:      Pregnancy 36 and 4/7 weeks     Plan:   Labor and bleeding precautions given.   US reviewed  Follow up in 1 Week.

## 2022-11-30 ENCOUNTER — ROUTINE PRENATAL (OUTPATIENT)
Dept: OBSTETRICS AND GYNECOLOGY | Facility: CLINIC | Age: 27
End: 2022-11-30
Payer: COMMERCIAL

## 2022-11-30 VITALS
SYSTOLIC BLOOD PRESSURE: 115 MMHG | DIASTOLIC BLOOD PRESSURE: 79 MMHG | BODY MASS INDEX: 32.54 KG/M2 | HEART RATE: 80 BPM | WEIGHT: 189.63 LBS

## 2022-11-30 DIAGNOSIS — Z3A.37 37 WEEKS GESTATION OF PREGNANCY: ICD-10-CM

## 2022-11-30 DIAGNOSIS — R82.998 LEUKOCYTES IN URINE: ICD-10-CM

## 2022-11-30 DIAGNOSIS — O99.343 ANXIETY IN PREGNANCY IN THIRD TRIMESTER, ANTEPARTUM: Primary | ICD-10-CM

## 2022-11-30 DIAGNOSIS — F41.9 ANXIETY IN PREGNANCY IN THIRD TRIMESTER, ANTEPARTUM: Primary | ICD-10-CM

## 2022-11-30 LAB
BILIRUB SERPL-MCNC: NORMAL MG/DL
BLOOD URINE, POC: NORMAL
COLOR, POC UA: NORMAL
GLUCOSE UR QL STRIP: NORMAL
KETONES UR QL STRIP: NORMAL
LEUKOCYTE ESTERASE URINE, POC: NORMAL
NITRITE, POC UA: NORMAL
PH, POC UA: 8
PROTEIN, POC: NORMAL
SPECIFIC GRAVITY, POC UA: 1.01
UROBILINOGEN, POC UA: 0.2

## 2022-11-30 PROCEDURE — 87086 URINE CULTURE/COLONY COUNT: CPT | Mod: ,,, | Performed by: CLINICAL MEDICAL LABORATORY

## 2022-11-30 PROCEDURE — 81003 URINALYSIS AUTO W/O SCOPE: CPT | Mod: PBBFAC | Performed by: STUDENT IN AN ORGANIZED HEALTH CARE EDUCATION/TRAINING PROGRAM

## 2022-11-30 PROCEDURE — 0502F SUBSEQUENT PRENATAL CARE: CPT | Mod: ,,, | Performed by: STUDENT IN AN ORGANIZED HEALTH CARE EDUCATION/TRAINING PROGRAM

## 2022-11-30 PROCEDURE — 87086 CULTURE, URINE: ICD-10-PCS | Mod: ,,, | Performed by: CLINICAL MEDICAL LABORATORY

## 2022-11-30 PROCEDURE — 99213 OFFICE O/P EST LOW 20 MIN: CPT | Mod: PBBFAC | Performed by: STUDENT IN AN ORGANIZED HEALTH CARE EDUCATION/TRAINING PROGRAM

## 2022-11-30 PROCEDURE — 0502F PR SUBSEQUENT PRENATAL CARE: ICD-10-PCS | Mod: ,,, | Performed by: STUDENT IN AN ORGANIZED HEALTH CARE EDUCATION/TRAINING PROGRAM

## 2022-12-01 NOTE — PROGRESS NOTES
Subjective:      Lorenza Santiago is a 27 y.o. female being seen today for her obstetrical visit. She is at 37w6d gestation. Patient reports no bleeding, no leaking, and occasional contractions. Fetal movement: normal.    Menstrual History:  OB History          2    Para        Term                AB   1    Living             SAB   1    IAB        Ectopic        Multiple        Live Births                      The following portions of the patient's history were reviewed and updated as appropriate: allergies, current medications, past family history, past medical history, past social history, past surgical history, and problem list.    Review of Systems  Pertinent items are noted in HPI.     Objective:      /79   Pulse 80   Wt 86 kg (189 lb 9.6 oz)   LMP  (Exact Date)   BMI 32.54 kg/m²   FHT: 160 BPM   Uterine Size: 37 cm   Presentations: cephalic   Pelvic Exam:               Dilation: 1cm        Effacement: 50%              Station:  -3     Consistency: soft             Position: middle        Assessment:      Pregnancy 37 and 6/7 weeks     Plan:    Plans for delivery: Vaginal anticipated  Beta strep culture: results reviewed  Counseling: L&D discussion: symptoms of labor, rupture of membranes, and hospital length of stay.  Follow up in 1 Week.

## 2022-12-03 LAB — UA COMPLETE W REFLEX CULTURE PNL UR: NORMAL

## 2022-12-08 ENCOUNTER — ROUTINE PRENATAL (OUTPATIENT)
Dept: OBSTETRICS AND GYNECOLOGY | Facility: CLINIC | Age: 27
End: 2022-12-08
Payer: COMMERCIAL

## 2022-12-08 VITALS
DIASTOLIC BLOOD PRESSURE: 70 MMHG | SYSTOLIC BLOOD PRESSURE: 118 MMHG | WEIGHT: 190.81 LBS | BODY MASS INDEX: 32.75 KG/M2

## 2022-12-08 DIAGNOSIS — O09.299 HISTORY OF MISCARRIAGE, CURRENTLY PREGNANT: ICD-10-CM

## 2022-12-08 DIAGNOSIS — Z3A.39 39 WEEKS GESTATION OF PREGNANCY: ICD-10-CM

## 2022-12-08 DIAGNOSIS — O99.343 ANXIETY IN PREGNANCY IN THIRD TRIMESTER, ANTEPARTUM: Primary | ICD-10-CM

## 2022-12-08 DIAGNOSIS — F41.9 ANXIETY IN PREGNANCY IN THIRD TRIMESTER, ANTEPARTUM: Primary | ICD-10-CM

## 2022-12-08 PROCEDURE — 99213 OFFICE O/P EST LOW 20 MIN: CPT | Mod: PBBFAC | Performed by: STUDENT IN AN ORGANIZED HEALTH CARE EDUCATION/TRAINING PROGRAM

## 2022-12-08 PROCEDURE — 0502F PR SUBSEQUENT PRENATAL CARE: ICD-10-PCS | Mod: ,,, | Performed by: STUDENT IN AN ORGANIZED HEALTH CARE EDUCATION/TRAINING PROGRAM

## 2022-12-08 PROCEDURE — 81003 URINALYSIS AUTO W/O SCOPE: CPT | Mod: PBBFAC | Performed by: STUDENT IN AN ORGANIZED HEALTH CARE EDUCATION/TRAINING PROGRAM

## 2022-12-08 PROCEDURE — 0502F SUBSEQUENT PRENATAL CARE: CPT | Mod: ,,, | Performed by: STUDENT IN AN ORGANIZED HEALTH CARE EDUCATION/TRAINING PROGRAM

## 2022-12-08 RX ORDER — CALCIUM CARBONATE 200(500)MG
500 TABLET,CHEWABLE ORAL 3 TIMES DAILY PRN
Status: CANCELLED | OUTPATIENT
Start: 2022-12-08

## 2022-12-08 RX ORDER — PROCHLORPERAZINE EDISYLATE 5 MG/ML
5 INJECTION INTRAMUSCULAR; INTRAVENOUS EVERY 6 HOURS PRN
Status: CANCELLED | OUTPATIENT
Start: 2022-12-08

## 2022-12-08 RX ORDER — TERBUTALINE SULFATE 1 MG/ML
0.25 INJECTION SUBCUTANEOUS
Status: CANCELLED | OUTPATIENT
Start: 2022-12-08

## 2022-12-08 RX ORDER — MISOPROSTOL 100 UG/1
800 TABLET ORAL
Status: CANCELLED | OUTPATIENT
Start: 2022-12-08

## 2022-12-08 RX ORDER — ACETAMINOPHEN 325 MG/1
650 TABLET ORAL EVERY 6 HOURS PRN
Status: CANCELLED | OUTPATIENT
Start: 2022-12-08

## 2022-12-08 RX ORDER — OXYTOCIN/RINGER'S LACTATE 30/500 ML
334 PLASTIC BAG, INJECTION (ML) INTRAVENOUS ONCE
Status: CANCELLED | OUTPATIENT
Start: 2022-12-08 | End: 2022-12-08

## 2022-12-08 RX ORDER — SODIUM CHLORIDE, SODIUM LACTATE, POTASSIUM CHLORIDE, CALCIUM CHLORIDE 600; 310; 30; 20 MG/100ML; MG/100ML; MG/100ML; MG/100ML
INJECTION, SOLUTION INTRAVENOUS CONTINUOUS
Status: CANCELLED | OUTPATIENT
Start: 2022-12-08

## 2022-12-08 RX ORDER — OXYTOCIN/RINGER'S LACTATE 30/500 ML
0-30 PLASTIC BAG, INJECTION (ML) INTRAVENOUS CONTINUOUS
Status: CANCELLED | OUTPATIENT
Start: 2022-12-08

## 2022-12-08 RX ORDER — CARBOPROST TROMETHAMINE 250 UG/ML
250 INJECTION, SOLUTION INTRAMUSCULAR
Status: CANCELLED | OUTPATIENT
Start: 2022-12-08

## 2022-12-08 RX ORDER — MUPIROCIN 20 MG/G
OINTMENT TOPICAL
Status: CANCELLED | OUTPATIENT
Start: 2022-12-08

## 2022-12-08 RX ORDER — DIPHENOXYLATE HYDROCHLORIDE AND ATROPINE SULFATE 2.5; .025 MG/1; MG/1
1 TABLET ORAL 4 TIMES DAILY PRN
Status: CANCELLED | OUTPATIENT
Start: 2022-12-08

## 2022-12-08 RX ORDER — OXYTOCIN/RINGER'S LACTATE 30/500 ML
95 PLASTIC BAG, INJECTION (ML) INTRAVENOUS ONCE
Status: CANCELLED | OUTPATIENT
Start: 2022-12-08 | End: 2022-12-08

## 2022-12-08 RX ORDER — ONDANSETRON 4 MG/1
8 TABLET, ORALLY DISINTEGRATING ORAL EVERY 8 HOURS PRN
Status: CANCELLED | OUTPATIENT
Start: 2022-12-08

## 2022-12-08 RX ORDER — SIMETHICONE 80 MG
1 TABLET,CHEWABLE ORAL 4 TIMES DAILY PRN
Status: CANCELLED | OUTPATIENT
Start: 2022-12-08

## 2022-12-08 RX ORDER — METHYLERGONOVINE MALEATE 0.2 MG/ML
200 INJECTION INTRAVENOUS
Status: CANCELLED | OUTPATIENT
Start: 2022-12-08

## 2022-12-08 RX ORDER — BUTORPHANOL TARTRATE 1 MG/ML
2 INJECTION INTRAMUSCULAR; INTRAVENOUS
Status: CANCELLED | OUTPATIENT
Start: 2022-12-08

## 2022-12-08 RX ORDER — SODIUM CHLORIDE 9 MG/ML
INJECTION, SOLUTION INTRAVENOUS
Status: CANCELLED | OUTPATIENT
Start: 2022-12-08

## 2022-12-08 RX ORDER — BUTORPHANOL TARTRATE 1 MG/ML
1 INJECTION INTRAMUSCULAR; INTRAVENOUS
Status: CANCELLED | OUTPATIENT
Start: 2022-12-08

## 2022-12-08 RX ORDER — MISOPROSTOL 100 MCG
50 TABLET ORAL ONCE
Status: CANCELLED | OUTPATIENT
Start: 2022-12-08

## 2022-12-08 RX ORDER — LIDOCAINE HYDROCHLORIDE 10 MG/ML
10 INJECTION INFILTRATION; PERINEURAL ONCE AS NEEDED
Status: CANCELLED | OUTPATIENT
Start: 2022-12-08 | End: 2034-05-06

## 2022-12-08 NOTE — PROGRESS NOTES
Subjective:      Lorenza Santiago is a 27 y.o. female being seen today for her obstetrical visit. She is at 39w0d gestation. Patient reports  mild contractions . Fetal movement: normal.    Menstrual History:  OB History          2    Para        Term                AB   1    Living             SAB   1    IAB        Ectopic        Multiple        Live Births                         The following portions of the patient's history were reviewed and updated as appropriate: allergies, current medications, past family history, past medical history, past social history, past surgical history, and problem list.      Review of Systems  Pertinent items are noted in HPI.     Objective:      /70   Wt 86.5 kg (190 lb 12.8 oz)   LMP  (Exact Date)   BMI 32.75 kg/m²   FHT: 140 BPM   Uterine Size: 39 cm   Presentations: cephalic   Pelvic Exam:               Dilation: 2cm        Effacement: 60%              Station:  -3     Consistency: soft             Position: anterior        Assessment:      Pregnancy 39 and 0/7 weeks     Plan:    Plans for delivery: Vaginal anticipated  IOL for 22 at 0000.

## 2022-12-12 ENCOUNTER — HOSPITAL ENCOUNTER (INPATIENT)
Facility: HOSPITAL | Age: 27
LOS: 2 days | Discharge: HOME | End: 2022-12-15
Attending: STUDENT IN AN ORGANIZED HEALTH CARE EDUCATION/TRAINING PROGRAM | Admitting: STUDENT IN AN ORGANIZED HEALTH CARE EDUCATION/TRAINING PROGRAM
Payer: COMMERCIAL

## 2022-12-12 DIAGNOSIS — Z3A.39 39 WEEKS GESTATION OF PREGNANCY: ICD-10-CM

## 2022-12-12 DIAGNOSIS — Z34.90 ENCOUNTER FOR ELECTIVE INDUCTION OF LABOR: Primary | ICD-10-CM

## 2022-12-13 ENCOUNTER — ANESTHESIA (OUTPATIENT)
Dept: OBSTETRICS AND GYNECOLOGY | Facility: HOSPITAL | Age: 27
End: 2022-12-13
Payer: COMMERCIAL

## 2022-12-13 ENCOUNTER — ANESTHESIA EVENT (OUTPATIENT)
Dept: OBSTETRICS AND GYNECOLOGY | Facility: HOSPITAL | Age: 27
End: 2022-12-13
Payer: COMMERCIAL

## 2022-12-13 PROBLEM — Z34.90 ENCOUNTER FOR ELECTIVE INDUCTION OF LABOR: Status: ACTIVE | Noted: 2022-12-13

## 2022-12-13 PROBLEM — Z3A.39 39 WEEKS GESTATION OF PREGNANCY: Status: ACTIVE | Noted: 2022-04-28

## 2022-12-13 LAB
ALBUMIN SERPL BCP-MCNC: 3.1 G/DL (ref 3.5–5)
ALBUMIN/GLOB SERPL: 0.7 {RATIO}
ALP SERPL-CCNC: 229 U/L (ref 37–98)
ALT SERPL W P-5'-P-CCNC: 14 U/L (ref 13–56)
ANION GAP SERPL CALCULATED.3IONS-SCNC: 17 MMOL/L (ref 7–16)
AST SERPL W P-5'-P-CCNC: 22 U/L (ref 15–37)
BACTERIA #/AREA URNS HPF: ABNORMAL /HPF
BASOPHILS # BLD AUTO: 0.05 K/UL (ref 0–0.2)
BASOPHILS NFR BLD AUTO: 0.4 % (ref 0–1)
BILIRUB SERPL-MCNC: 0.5 MG/DL (ref ?–1.2)
BILIRUB UR QL STRIP: NEGATIVE
BUN SERPL-MCNC: 6 MG/DL (ref 7–18)
BUN/CREAT SERPL: 10 (ref 6–20)
CALCIUM SERPL-MCNC: 9.7 MG/DL (ref 8.5–10.1)
CHLORIDE SERPL-SCNC: 102 MMOL/L (ref 98–107)
CLARITY UR: CLEAR
CO2 SERPL-SCNC: 23 MMOL/L (ref 21–32)
COLOR UR: ABNORMAL
CREAT SERPL-MCNC: 0.59 MG/DL (ref 0.55–1.02)
DIFFERENTIAL METHOD BLD: ABNORMAL
EGFR (NO RACE VARIABLE) (RUSH/TITUS): 128 ML/MIN/1.73M²
EOSINOPHIL # BLD AUTO: 0.06 K/UL (ref 0–0.5)
EOSINOPHIL NFR BLD AUTO: 0.5 % (ref 1–4)
ERYTHROCYTE [DISTWIDTH] IN BLOOD BY AUTOMATED COUNT: 14.1 % (ref 11.5–14.5)
GLOBULIN SER-MCNC: 4.4 G/DL (ref 2–4)
GLUCOSE SERPL-MCNC: 77 MG/DL (ref 74–106)
GLUCOSE UR STRIP-MCNC: NORMAL MG/DL
HBV SURFACE AG SERPL QL IA: NORMAL
HCT VFR BLD AUTO: 36 % (ref 38–47)
HGB BLD-MCNC: 11.9 G/DL (ref 12–16)
HIV 1+O+2 AB SERPL QL: NORMAL
IMM GRANULOCYTES # BLD AUTO: 0.06 K/UL (ref 0–0.04)
IMM GRANULOCYTES NFR BLD: 0.5 % (ref 0–0.4)
INDIRECT COOMBS: NORMAL
KETONES UR STRIP-SCNC: NEGATIVE MG/DL
LEUKOCYTE ESTERASE UR QL STRIP: ABNORMAL
LYMPHOCYTES # BLD AUTO: 3.18 K/UL (ref 1–4.8)
LYMPHOCYTES NFR BLD AUTO: 24.9 % (ref 27–41)
MCH RBC QN AUTO: 28.5 PG (ref 27–31)
MCHC RBC AUTO-ENTMCNC: 33.1 G/DL (ref 32–36)
MCV RBC AUTO: 86.3 FL (ref 80–96)
MONOCYTES # BLD AUTO: 0.97 K/UL (ref 0–0.8)
MONOCYTES NFR BLD AUTO: 7.6 % (ref 2–6)
MPC BLD CALC-MCNC: 11.2 FL (ref 9.4–12.4)
NEUTROPHILS # BLD AUTO: 8.43 K/UL (ref 1.8–7.7)
NEUTROPHILS NFR BLD AUTO: 66.1 % (ref 53–65)
NITRITE UR QL STRIP: NEGATIVE
NRBC # BLD AUTO: 0 X10E3/UL
NRBC, AUTO (.00): 0 %
PH UR STRIP: 7 PH UNITS
PLATELET # BLD AUTO: 345 K/UL (ref 150–400)
POTASSIUM SERPL-SCNC: 4 MMOL/L (ref 3.5–5.1)
PROT SERPL-MCNC: 7.5 G/DL (ref 6.4–8.2)
PROT UR QL STRIP: NEGATIVE
RBC # BLD AUTO: 4.17 M/UL (ref 4.2–5.4)
RBC # UR STRIP: NEGATIVE /UL
RBC #/AREA URNS HPF: ABNORMAL /HPF
RH BLD: NORMAL
SODIUM SERPL-SCNC: 138 MMOL/L (ref 136–145)
SP GR UR STRIP: 1.01
SQUAMOUS #/AREA URNS LPF: ABNORMAL /LPF
SYPHILIS AB INTERPRETATION: NORMAL
UROBILINOGEN UR STRIP-ACNC: NORMAL MG/DL
WBC # BLD AUTO: 12.75 K/UL (ref 4.5–11)
WBC #/AREA URNS HPF: ABNORMAL /HPF

## 2022-12-13 PROCEDURE — 59400 PR FULL ROUT OBSTE CARE,VAGINAL DELIV: ICD-10-PCS | Mod: TH,,, | Performed by: STUDENT IN AN ORGANIZED HEALTH CARE EDUCATION/TRAINING PROGRAM

## 2022-12-13 PROCEDURE — 81003 URINALYSIS AUTO W/O SCOPE: CPT | Performed by: STUDENT IN AN ORGANIZED HEALTH CARE EDUCATION/TRAINING PROGRAM

## 2022-12-13 PROCEDURE — 80053 COMPREHEN METABOLIC PANEL: CPT | Performed by: STUDENT IN AN ORGANIZED HEALTH CARE EDUCATION/TRAINING PROGRAM

## 2022-12-13 PROCEDURE — 62326 NJX INTERLAMINAR LMBR/SAC: CPT | Performed by: ANESTHESIOLOGY

## 2022-12-13 PROCEDURE — 63600175 PHARM REV CODE 636 W HCPCS: Performed by: ANESTHESIOLOGY

## 2022-12-13 PROCEDURE — 51702 INSERT TEMP BLADDER CATH: CPT

## 2022-12-13 PROCEDURE — 25000003 PHARM REV CODE 250: Performed by: STUDENT IN AN ORGANIZED HEALTH CARE EDUCATION/TRAINING PROGRAM

## 2022-12-13 PROCEDURE — 72100002 HC LABOR CARE, 1ST 8 HOURS

## 2022-12-13 PROCEDURE — 85025 COMPLETE CBC W/AUTO DIFF WBC: CPT | Performed by: STUDENT IN AN ORGANIZED HEALTH CARE EDUCATION/TRAINING PROGRAM

## 2022-12-13 PROCEDURE — 86850 RBC ANTIBODY SCREEN: CPT | Performed by: STUDENT IN AN ORGANIZED HEALTH CARE EDUCATION/TRAINING PROGRAM

## 2022-12-13 PROCEDURE — 72100003 HC LABOR CARE, EA. ADDL. 8 HRS

## 2022-12-13 PROCEDURE — 59400 OBSTETRICAL CARE: CPT | Mod: TH,,, | Performed by: STUDENT IN AN ORGANIZED HEALTH CARE EDUCATION/TRAINING PROGRAM

## 2022-12-13 PROCEDURE — 59400 OBSTETRICAL CARE: CPT | Mod: ,,, | Performed by: ANESTHESIOLOGY

## 2022-12-13 PROCEDURE — 87389 HIV-1 AG W/HIV-1&-2 AB AG IA: CPT | Performed by: STUDENT IN AN ORGANIZED HEALTH CARE EDUCATION/TRAINING PROGRAM

## 2022-12-13 PROCEDURE — 72200006 HC VAGINAL DELIVERY LEVEL III

## 2022-12-13 PROCEDURE — 87340 HEPATITIS B SURFACE AG IA: CPT | Performed by: STUDENT IN AN ORGANIZED HEALTH CARE EDUCATION/TRAINING PROGRAM

## 2022-12-13 PROCEDURE — 87086 URINE CULTURE/COLONY COUNT: CPT | Performed by: STUDENT IN AN ORGANIZED HEALTH CARE EDUCATION/TRAINING PROGRAM

## 2022-12-13 PROCEDURE — 36415 COLL VENOUS BLD VENIPUNCTURE: CPT | Performed by: STUDENT IN AN ORGANIZED HEALTH CARE EDUCATION/TRAINING PROGRAM

## 2022-12-13 PROCEDURE — 25000003 PHARM REV CODE 250: Performed by: ANESTHESIOLOGY

## 2022-12-13 PROCEDURE — 86780 TREPONEMA PALLIDUM: CPT | Performed by: STUDENT IN AN ORGANIZED HEALTH CARE EDUCATION/TRAINING PROGRAM

## 2022-12-13 PROCEDURE — 63600175 PHARM REV CODE 636 W HCPCS: Performed by: STUDENT IN AN ORGANIZED HEALTH CARE EDUCATION/TRAINING PROGRAM

## 2022-12-13 PROCEDURE — 59400 PRA FULL ROUT OBSTE CARE,VAGINAL DELIV: ICD-10-PCS | Mod: ,,, | Performed by: ANESTHESIOLOGY

## 2022-12-13 PROCEDURE — 81001 URINALYSIS AUTO W/SCOPE: CPT | Performed by: STUDENT IN AN ORGANIZED HEALTH CARE EDUCATION/TRAINING PROGRAM

## 2022-12-13 PROCEDURE — 11000001 HC ACUTE MED/SURG PRIVATE ROOM

## 2022-12-13 PROCEDURE — 82803 BLOOD GASES ANY COMBINATION: CPT

## 2022-12-13 RX ORDER — CARBOPROST TROMETHAMINE 250 UG/ML
250 INJECTION, SOLUTION INTRAMUSCULAR
Status: DISCONTINUED | OUTPATIENT
Start: 2022-12-13 | End: 2022-12-13

## 2022-12-13 RX ORDER — ACETAMINOPHEN 325 MG/1
650 TABLET ORAL EVERY 6 HOURS PRN
Status: DISCONTINUED | OUTPATIENT
Start: 2022-12-13 | End: 2022-12-13

## 2022-12-13 RX ORDER — OXYTOCIN/RINGER'S LACTATE 30/500 ML
0-30 PLASTIC BAG, INJECTION (ML) INTRAVENOUS CONTINUOUS
Status: DISCONTINUED | OUTPATIENT
Start: 2022-12-13 | End: 2022-12-13

## 2022-12-13 RX ORDER — TERBUTALINE SULFATE 1 MG/ML
0.25 INJECTION SUBCUTANEOUS
Status: DISCONTINUED | OUTPATIENT
Start: 2022-12-13 | End: 2022-12-13

## 2022-12-13 RX ORDER — DOCUSATE SODIUM 100 MG/1
200 CAPSULE, LIQUID FILLED ORAL 2 TIMES DAILY PRN
Status: DISCONTINUED | OUTPATIENT
Start: 2022-12-13 | End: 2022-12-14

## 2022-12-13 RX ORDER — MISOPROSTOL 100 MCG
50 TABLET ORAL ONCE
Status: COMPLETED | OUTPATIENT
Start: 2022-12-13 | End: 2022-12-13

## 2022-12-13 RX ORDER — EPHEDRINE SULFATE 50 MG/ML
10 INJECTION, SOLUTION INTRAVENOUS ONCE AS NEEDED
Status: DISCONTINUED | OUTPATIENT
Start: 2022-12-13 | End: 2022-12-13

## 2022-12-13 RX ORDER — FENTANYL/ROPIVACAINE/NS/PF 2MCG/ML-.2
10 PLASTIC BAG, INJECTION (ML) INJECTION CONTINUOUS
Status: DISCONTINUED | OUTPATIENT
Start: 2022-12-13 | End: 2022-12-13

## 2022-12-13 RX ORDER — MISOPROSTOL 200 UG/1
800 TABLET ORAL
Status: DISCONTINUED | OUTPATIENT
Start: 2022-12-13 | End: 2022-12-13

## 2022-12-13 RX ORDER — BUTORPHANOL TARTRATE 2 MG/ML
2 INJECTION INTRAMUSCULAR; INTRAVENOUS
Status: DISCONTINUED | OUTPATIENT
Start: 2022-12-13 | End: 2022-12-13

## 2022-12-13 RX ORDER — ONDANSETRON 2 MG/ML
4 INJECTION INTRAMUSCULAR; INTRAVENOUS EVERY 6 HOURS PRN
Status: DISCONTINUED | OUTPATIENT
Start: 2022-12-13 | End: 2022-12-13

## 2022-12-13 RX ORDER — OXYTOCIN/RINGER'S LACTATE 30/500 ML
334 PLASTIC BAG, INJECTION (ML) INTRAVENOUS ONCE
Status: DISCONTINUED | OUTPATIENT
Start: 2022-12-13 | End: 2022-12-13

## 2022-12-13 RX ORDER — LIDOCAINE HYDROCHLORIDE 10 MG/ML
10 INJECTION INFILTRATION; PERINEURAL ONCE AS NEEDED
Status: DISCONTINUED | OUTPATIENT
Start: 2022-12-13 | End: 2022-12-13

## 2022-12-13 RX ORDER — PRENATAL WITH FERROUS FUM AND FOLIC ACID 3080; 920; 120; 400; 22; 1.84; 3; 20; 10; 1; 12; 200; 27; 25; 2 [IU]/1; [IU]/1; MG/1; [IU]/1; MG/1; MG/1; MG/1; MG/1; MG/1; MG/1; UG/1; MG/1; MG/1; MG/1; MG/1
1 TABLET ORAL DAILY
Status: DISCONTINUED | OUTPATIENT
Start: 2022-12-14 | End: 2022-12-15 | Stop reason: HOSPADM

## 2022-12-13 RX ORDER — MUPIROCIN 20 MG/G
OINTMENT TOPICAL
Status: DISCONTINUED | OUTPATIENT
Start: 2022-12-13 | End: 2022-12-13

## 2022-12-13 RX ORDER — ACETAMINOPHEN 325 MG/1
650 TABLET ORAL EVERY 6 HOURS PRN
Status: DISCONTINUED | OUTPATIENT
Start: 2022-12-13 | End: 2022-12-15 | Stop reason: HOSPADM

## 2022-12-13 RX ORDER — ONDANSETRON 4 MG/1
8 TABLET, ORALLY DISINTEGRATING ORAL EVERY 8 HOURS PRN
Status: DISCONTINUED | OUTPATIENT
Start: 2022-12-13 | End: 2022-12-13

## 2022-12-13 RX ORDER — MISOPROSTOL 100 MCG
50 TABLET ORAL
Status: DISCONTINUED | OUTPATIENT
Start: 2022-12-13 | End: 2022-12-13

## 2022-12-13 RX ORDER — OXYTOCIN/RINGER'S LACTATE 30/500 ML
95 PLASTIC BAG, INJECTION (ML) INTRAVENOUS ONCE
Status: DISCONTINUED | OUTPATIENT
Start: 2022-12-13 | End: 2022-12-13

## 2022-12-13 RX ORDER — METHYLERGONOVINE MALEATE 0.2 MG/ML
200 INJECTION INTRAVENOUS
Status: DISCONTINUED | OUTPATIENT
Start: 2022-12-13 | End: 2022-12-13

## 2022-12-13 RX ORDER — ROPIVACAINE HYDROCHLORIDE 7.5 MG/ML
INJECTION, SOLUTION EPIDURAL; PERINEURAL
Status: COMPLETED | OUTPATIENT
Start: 2022-12-13 | End: 2022-12-13

## 2022-12-13 RX ORDER — DIPHENOXYLATE HYDROCHLORIDE AND ATROPINE SULFATE 2.5; .025 MG/1; MG/1
1 TABLET ORAL 4 TIMES DAILY PRN
Status: DISCONTINUED | OUTPATIENT
Start: 2022-12-13 | End: 2022-12-13

## 2022-12-13 RX ORDER — OXYTOCIN/RINGER'S LACTATE 30/500 ML
95 PLASTIC BAG, INJECTION (ML) INTRAVENOUS ONCE
Status: DISCONTINUED | OUTPATIENT
Start: 2022-12-13 | End: 2022-12-15 | Stop reason: HOSPADM

## 2022-12-13 RX ORDER — FAMOTIDINE 10 MG/ML
20 INJECTION INTRAVENOUS ONCE AS NEEDED
Status: CANCELLED | OUTPATIENT
Start: 2022-12-13 | End: 2034-05-11

## 2022-12-13 RX ORDER — SODIUM CHLORIDE, SODIUM LACTATE, POTASSIUM CHLORIDE, CALCIUM CHLORIDE 600; 310; 30; 20 MG/100ML; MG/100ML; MG/100ML; MG/100ML
INJECTION, SOLUTION INTRAVENOUS CONTINUOUS
Status: DISCONTINUED | OUTPATIENT
Start: 2022-12-13 | End: 2022-12-13

## 2022-12-13 RX ORDER — HYDROCORTISONE 25 MG/G
CREAM TOPICAL 3 TIMES DAILY PRN
Status: DISCONTINUED | OUTPATIENT
Start: 2022-12-13 | End: 2022-12-15 | Stop reason: HOSPADM

## 2022-12-13 RX ORDER — PROCHLORPERAZINE EDISYLATE 5 MG/ML
5 INJECTION INTRAMUSCULAR; INTRAVENOUS EVERY 6 HOURS PRN
Status: DISCONTINUED | OUTPATIENT
Start: 2022-12-13 | End: 2022-12-13

## 2022-12-13 RX ORDER — SODIUM CITRATE AND CITRIC ACID MONOHYDRATE 334; 500 MG/5ML; MG/5ML
30 SOLUTION ORAL ONCE AS NEEDED
Status: CANCELLED | OUTPATIENT
Start: 2022-12-13 | End: 2034-05-11

## 2022-12-13 RX ORDER — SODIUM CHLORIDE 0.9 % (FLUSH) 0.9 %
10 SYRINGE (ML) INJECTION
Status: DISCONTINUED | OUTPATIENT
Start: 2022-12-13 | End: 2022-12-15 | Stop reason: HOSPADM

## 2022-12-13 RX ORDER — HYDROCODONE BITARTRATE AND ACETAMINOPHEN 5; 325 MG/1; MG/1
1 TABLET ORAL EVERY 4 HOURS PRN
Status: DISCONTINUED | OUTPATIENT
Start: 2022-12-13 | End: 2022-12-15 | Stop reason: HOSPADM

## 2022-12-13 RX ORDER — CALCIUM CARBONATE 200(500)MG
500 TABLET,CHEWABLE ORAL 3 TIMES DAILY PRN
Status: DISCONTINUED | OUTPATIENT
Start: 2022-12-13 | End: 2022-12-13

## 2022-12-13 RX ORDER — SODIUM CHLORIDE 9 MG/ML
INJECTION, SOLUTION INTRAVENOUS
Status: DISCONTINUED | OUTPATIENT
Start: 2022-12-13 | End: 2022-12-13

## 2022-12-13 RX ORDER — IBUPROFEN 600 MG/1
600 TABLET ORAL EVERY 6 HOURS
Status: DISCONTINUED | OUTPATIENT
Start: 2022-12-14 | End: 2022-12-14

## 2022-12-13 RX ORDER — ONDANSETRON 4 MG/1
8 TABLET, ORALLY DISINTEGRATING ORAL EVERY 8 HOURS PRN
Status: DISCONTINUED | OUTPATIENT
Start: 2022-12-13 | End: 2022-12-15 | Stop reason: HOSPADM

## 2022-12-13 RX ORDER — SIMETHICONE 80 MG
1 TABLET,CHEWABLE ORAL 4 TIMES DAILY PRN
Status: DISCONTINUED | OUTPATIENT
Start: 2022-12-13 | End: 2022-12-13

## 2022-12-13 RX ORDER — SIMETHICONE 80 MG
1 TABLET,CHEWABLE ORAL EVERY 6 HOURS PRN
Status: DISCONTINUED | OUTPATIENT
Start: 2022-12-13 | End: 2022-12-15 | Stop reason: HOSPADM

## 2022-12-13 RX ORDER — DIPHENHYDRAMINE HCL 25 MG
25 CAPSULE ORAL EVERY 4 HOURS PRN
Status: DISCONTINUED | OUTPATIENT
Start: 2022-12-13 | End: 2022-12-15 | Stop reason: HOSPADM

## 2022-12-13 RX ORDER — DIPHENHYDRAMINE HYDROCHLORIDE 50 MG/ML
25 INJECTION INTRAMUSCULAR; INTRAVENOUS EVERY 4 HOURS PRN
Status: DISCONTINUED | OUTPATIENT
Start: 2022-12-13 | End: 2022-12-15 | Stop reason: HOSPADM

## 2022-12-13 RX ORDER — PROCHLORPERAZINE EDISYLATE 5 MG/ML
5 INJECTION INTRAMUSCULAR; INTRAVENOUS EVERY 6 HOURS PRN
Status: DISCONTINUED | OUTPATIENT
Start: 2022-12-13 | End: 2022-12-15 | Stop reason: HOSPADM

## 2022-12-13 RX ORDER — BUTORPHANOL TARTRATE 2 MG/ML
1 INJECTION INTRAMUSCULAR; INTRAVENOUS
Status: DISCONTINUED | OUTPATIENT
Start: 2022-12-13 | End: 2022-12-13

## 2022-12-13 RX ADMIN — IBUPROFEN 600 MG: 600 TABLET, FILM COATED ORAL at 11:12

## 2022-12-13 RX ADMIN — SODIUM CHLORIDE, POTASSIUM CHLORIDE, SODIUM LACTATE AND CALCIUM CHLORIDE: 600; 310; 30; 20 INJECTION, SOLUTION INTRAVENOUS at 06:12

## 2022-12-13 RX ADMIN — ROPIVACAINE HYDROCHLORIDE 5 ML: 7.5 INJECTION, SOLUTION EPIDURAL; PERINEURAL at 08:12

## 2022-12-13 RX ADMIN — SODIUM CHLORIDE, POTASSIUM CHLORIDE, SODIUM LACTATE AND CALCIUM CHLORIDE: 600; 310; 30; 20 INJECTION, SOLUTION INTRAVENOUS at 01:12

## 2022-12-13 RX ADMIN — MISOPROSTOL 50 MCG: 100 TABLET ORAL at 01:12

## 2022-12-13 RX ADMIN — Medication 2 MILLI-UNITS/MIN: at 09:12

## 2022-12-13 RX ADMIN — ONDANSETRON 4 MG: 2 INJECTION INTRAMUSCULAR; INTRAVENOUS at 11:12

## 2022-12-13 RX ADMIN — ONDANSETRON 8 MG: 4 TABLET, ORALLY DISINTEGRATING ORAL at 10:12

## 2022-12-13 RX ADMIN — MISOPROSTOL 50 MCG: 100 TABLET ORAL at 05:12

## 2022-12-13 RX ADMIN — HYDROCODONE BITARTRATE AND ACETAMINOPHEN 1 TABLET: 5; 325 TABLET ORAL at 08:12

## 2022-12-13 RX ADMIN — ROPIVACAINE HYDROCHLORIDE 10 ML/HR: 10 INJECTION, SOLUTION EPIDURAL at 09:12

## 2022-12-13 RX ADMIN — SODIUM CHLORIDE, POTASSIUM CHLORIDE, SODIUM LACTATE AND CALCIUM CHLORIDE: 600; 310; 30; 20 INJECTION, SOLUTION INTRAVENOUS at 09:12

## 2022-12-13 RX ADMIN — Medication 26 MILLI-UNITS/MIN: at 06:12

## 2022-12-13 RX ADMIN — SODIUM CHLORIDE, POTASSIUM CHLORIDE, SODIUM LACTATE AND CALCIUM CHLORIDE: 600; 310; 30; 20 INJECTION, SOLUTION INTRAVENOUS at 08:12

## 2022-12-13 RX ADMIN — TRANEXAMIC ACID 1000 MG: 100 INJECTION, SOLUTION INTRAVENOUS at 07:12

## 2022-12-13 RX ADMIN — ROPIVACAINE HYDROCHLORIDE 10 ML/HR: 10 INJECTION, SOLUTION EPIDURAL at 08:12

## 2022-12-13 NOTE — HPI
27 y.o.  at 39w5d presents to L&D for scheduled IOL. She denies vaginal bleeding, LOF, ctx. Reports good fetal movement. Prenatal care with Dr. Beltran. Pregnancy complicated by anxiety

## 2022-12-13 NOTE — SUBJECTIVE & OBJECTIVE
Obstetric HPI:  Patient reports None contractions, active fetal movement, No vaginal bleeding , No loss of fluid       OB History    Para Term  AB Living   2 0 0 0 1 0   SAB IAB Ectopic Multiple Live Births   1 0 0 0 0      # Outcome Date GA Lbr Giles/2nd Weight Sex Delivery Anes PTL Lv   2 Current            1 SAB              History reviewed. No pertinent past medical history.  Past Surgical History:   Procedure Laterality Date    DILATION AND CURETTAGE OF UTERUS  2022    DILATION AND CURETTAGE OF UTERUS USING SUCTION N/A 2021    Procedure: DILATION AND CURETTAGE, UTERUS, USING SUCTION;  Surgeon: Onel Beltran DO;  Location: [facility];  Service: OB/GYN;  Laterality: N/A;       PTA Medications   Medication Sig    clindamycin (CLEOCIN) 150 MG capsule Take 150 mg by mouth every 6 (six) hours.    prenatal vit no.124/iron/folic (PRENATAL VITAMIN ORAL) Take 1 capsule by mouth once daily at 6am.       Review of patient's allergies indicates:  No Known Allergies     Family History       Family history is unknown by patient.          Tobacco Use    Smoking status: Never    Smokeless tobacco: Never   Substance and Sexual Activity    Alcohol use: Not Currently    Drug use: Never    Sexual activity: Yes     Partners: Male     Review of Systems   All other systems reviewed and are negative.   Objective:     Vital Signs (Most Recent):  Temp: 96.3 °F (35.7 °C) (22 0731)  Pulse: 74 (22 1024)  Resp: 20 (22 0928)  BP: 109/64 (22 1009)  SpO2: 100 % (22 1024) Vital Signs (24h Range):  Temp:  [96.3 °F (35.7 °C)-97.5 °F (36.4 °C)] 96.3 °F (35.7 °C)  Pulse:  [56-84] 74  Resp:  [18-20] 20  SpO2:  [96 %-100 %] 100 %  BP: (103-137)/(55-84) 109/64     Weight: 87.1 kg (192 lb)  Body mass index is 32.96 kg/m².    FHT: 120 Cat 1 (reassuring)  TOCO:  Quiet    Physical Exam:   Constitutional: She is oriented to person, place, and time. She appears well-developed and  well-nourished.    HENT:   Head: Normocephalic and atraumatic.      Cardiovascular:  Normal rate.             Pulmonary/Chest: Effort normal.        Abdominal: Soft. There is no abdominal tenderness.     Genitourinary:    Uterus normal.             Musculoskeletal: Normal range of motion.       Neurological: She is alert and oriented to person, place, and time.    Skin: Skin is warm and dry.    Psychiatric: She has a normal mood and affect. Her behavior is normal. Judgment and thought content normal.     Cervix:  Dilation:  2  Effacement:  70%  Station: -3  Presentation: Vertex     Significant Labs:  Lab Results   Component Value Date    GROUPTRH A POS 12/13/2022    HEPBSAG Non-Reactive 12/13/2022    AFP 25.1 06/30/2022       CBC:   Recent Labs   Lab 12/13/22  0053   WBC 12.75*   RBC 4.17*   HGB 11.9*   HCT 36.0*      MCV 86.3   MCH 28.5   MCHC 33.1     I have personallly reviewed all pertinent lab results from the last 24 hours.  Recent Lab Results         12/13/22  0125   12/13/22  0053        Albumin/Globulin Ratio   0.7       Albumin   3.1       Alkaline Phosphatase   229       ALT   14       ANION GAP   17       Appearance, UA Clear         AST   22       Bacteria, UA Moderate         Baso #   0.05       Basophil %   0.4       Bilirubin (UA) Negative         BILIRUBIN TOTAL   0.5       BUN   6       BUN/CREAT RATIO   10       Calcium   9.7       Chloride   102       CO2   23       Color, UA Light Yellow         Creatinine   0.59       Differential Type   Auto       eGFR   128       Eos #   0.06       Eosinophil %   0.5       Globulin, Total   4.4       Glucose   77       Glucose, UA Normal         Group & Rh   A POS       HEMATOCRIT   36.0       HEMOGLOBIN   11.9       Hepatitis B Surface Ag   Non-Reactive       HIV 1/2 Ag/Ab   Non-Reactive       Immature Grans (Abs)   0.06       Immature Granulocytes   0.5       INDIRECT ANJELICA   NEG       Ketones, UA Negative         Leukocytes, UA Large         Lymph  #   3.18       Lymph %   24.9       MCH   28.5       MCHC   33.1       MCV   86.3       Mono #   0.97       Mono %   7.6       MPV   11.2       Neutrophils, Abs   8.43       Neutrophils Relative   66.1       NITRITE UA Negative         nRBC   0.0       NUCLEATED RBC ABSOLUTE   0.00       Occult Blood UA Negative         pH, UA 7.0         Platelets   345       Potassium   4.0       PROTEIN TOTAL   7.5       Protein, UA Negative         RBC   4.17       RBC, UA None Seen         RDW   14.1       Sodium   138       Specific [place], UA 1.008         Squam Epithel, UA Few         Syphilis Ab Interpretation   Non-Reactive  Comment: 0.0 - 0.9: Non-Reactive  0.91 - 1.10: Equivocal with RPR to follow  >1.10:  Reactive with RPR to Follow       UROBILINOGEN UA Normal         WBC, UA 0-5         WBC   12.75

## 2022-12-13 NOTE — H&P
[facility] -  Labor and Delivery  Obstetrics  History & Physical    Patient Name: Lorenza Santiago  MRN: [id number]  Admission Date: 2022  Primary Care Provider: Primary Doctor No    Subjective:     Principal Problem:Encounter for elective induction of labor    History of Present Illness:  27 y.o.  at 39w5d presents to L&D for scheduled IOL. She denies vaginal bleeding, LOF, ctx. Reports good fetal movement. Prenatal care with Dr. Beltran. Pregnancy complicated by anxiety          Obstetric HPI:  Patient reports None contractions, active fetal movement, No vaginal bleeding , No loss of fluid       OB History    Para Term  AB Living   2 0 0 0 1 0   SAB IAB Ectopic Multiple Live Births   1 0 0 0 0      # Outcome Date GA Lbr Giles/2nd Weight Sex Delivery Anes PTL Lv   2 Current            1 SAB              History reviewed. No pertinent past medical history.  Past Surgical History:   Procedure Laterality Date    DILATION AND CURETTAGE OF UTERUS  2022    DILATION AND CURETTAGE OF UTERUS USING SUCTION N/A 2021    Procedure: DILATION AND CURETTAGE, UTERUS, USING SUCTION;  Surgeon: Onel Beltran DO;  Location: [facility] OR;  Service: OB/GYN;  Laterality: N/A;       PTA Medications   Medication Sig    clindamycin (CLEOCIN) 150 MG capsule Take 150 mg by mouth every 6 (six) hours.    prenatal vit no.124/iron/folic (PRENATAL VITAMIN ORAL) Take 1 capsule by mouth once daily at 6am.       Review of patient's allergies indicates:  No Known Allergies     Family History       Family history is unknown by patient.          Tobacco Use    Smoking status: Never    Smokeless tobacco: Never   Substance and Sexual Activity    Alcohol use: Not Currently    Drug use: Never    Sexual activity: Yes     Partners: Male     Review of Systems   All other systems reviewed and are negative.   Objective:     Vital Signs (Most Recent):  Temp: 96.3 °F (35.7 °C) (22 0731)  Pulse:  74 (12/13/22 1024)  Resp: 20 (12/13/22 0928)  BP: 109/64 (12/13/22 1009)  SpO2: 100 % (12/13/22 1024) Vital Signs (24h Range):  Temp:  [96.3 °F (35.7 °C)-97.5 °F (36.4 °C)] 96.3 °F (35.7 °C)  Pulse:  [56-84] 74  Resp:  [18-20] 20  SpO2:  [96 %-100 %] 100 %  BP: (103-137)/(55-84) 109/64     Weight: 87.1 kg (192 lb)  Body mass index is 32.96 kg/m².    FHT: 120 Cat 1 (reassuring)  TOCO:  Quiet    Physical Exam:   Constitutional: She is oriented to person, place, and time. She appears well-developed and well-nourished.    HENT:   Head: Normocephalic and atraumatic.      Cardiovascular:  Normal rate.             Pulmonary/Chest: Effort normal.        Abdominal: Soft. There is no abdominal tenderness.     Genitourinary:    Uterus normal.             Musculoskeletal: Normal range of motion.       Neurological: She is alert and oriented to person, place, and time.    Skin: Skin is warm and dry.    Psychiatric: She has a normal mood and affect. Her behavior is normal. Judgment and thought content normal.     Cervix:  Dilation:  2  Effacement:  70%  Station: -3  Presentation: Vertex     Significant Labs:  Lab Results   Component Value Date    GROUPTRH A POS 12/13/2022    HEPBSAG Non-Reactive 12/13/2022    AFP 25.1 06/30/2022       CBC:   Recent Labs   Lab 12/13/22  0053   WBC 12.75*   RBC 4.17*   HGB 11.9*   HCT 36.0*      MCV 86.3   MCH 28.5   MCHC 33.1     I have personallly reviewed all pertinent lab results from the last 24 hours.  Recent Lab Results         12/13/22  0125   12/13/22  0053        Albumin/Globulin Ratio   0.7       Albumin   3.1       Alkaline Phosphatase   229       ALT   14       ANION GAP   17       Appearance, UA Clear         AST   22       Bacteria, UA Moderate         Baso #   0.05       Basophil %   0.4       Bilirubin (UA) Negative         BILIRUBIN TOTAL   0.5       BUN   6       BUN/CREAT RATIO   10       Calcium   9.7       Chloride   102       CO2   23       Color, UA Light Yellow          Creatinine   0.59       Differential Type   Auto       eGFR   128       Eos #   0.06       Eosinophil %   0.5       Globulin, Total   4.4       Glucose   77       Glucose, UA Normal         Group & Rh   A POS       HEMATOCRIT   36.0       HEMOGLOBIN   11.9       Hepatitis B Surface Ag   Non-Reactive       HIV 1/2 Ag/Ab   Non-Reactive       Immature Grans (Abs)   0.06       Immature Granulocytes   0.5       INDIRECT ANJELICA   NEG       Ketones, UA Negative         Leukocytes, UA Large         Lymph #   3.18       Lymph %   24.9       MCH   28.5       MCHC   33.1       MCV   86.3       Mono #   0.97       Mono %   7.6       MPV   11.2       Neutrophils, Abs   8.43       Neutrophils Relative   66.1       NITRITE UA Negative         nRBC   0.0       NUCLEATED RBC ABSOLUTE   0.00       Occult Blood UA Negative         pH, UA 7.0         Platelets   345       Potassium   4.0       PROTEIN TOTAL   7.5       Protein, UA Negative         RBC   4.17       RBC, UA None Seen         RDW   14.1       Sodium   138       Specific [place], UA 1.008         Squam Epithel, UA Few         Syphilis Ab Interpretation   Non-Reactive  Comment: 0.0 - 0.9: Non-Reactive  0.91 - 1.10: Equivocal with RPR to follow  >1.10:  Reactive with RPR to Follow       UROBILINOGEN UA Normal         WBC, UA 0-5         WBC   12.75             Assessment/Plan:     27 y.o. female  at 39w5d for:    * Encounter for elective induction of labor  Anticipate vaginal delivery    Anxiety in pregnancy in third trimester, antepartum  Currently on zoloft    39 weeks gestation of pregnancy         Onel Beltran DO  Obstetrics  [facility] -  Labor and Delivery

## 2022-12-13 NOTE — ANESTHESIA PROCEDURE NOTES
Epidural    Patient location during procedure: OB   Reason for block: primary anesthetic   Reason for block: labor analgesia requested by patient and obstetrician  Diagnosis: iup, term   Start time: 12/13/2022 8:53 AM  Timeout: 12/13/2022 8:53 AM    Staffing  Performing Provider: Dequan Pompa MD  Authorizing Provider: Dequan Pompa MD        Preanesthetic Checklist  Completed: patient identified, IV checked, site marked, risks and benefits discussed, surgical consent, monitors and equipment checked, pre-op evaluation, timeout performed, anesthesia consent given, hand hygiene performed and patient being monitored  Preparation  Patient position: sitting  Prep: Betadine  Patient monitoring: ECG, Pulse Ox, continuous capnometry and Blood Pressure  Reason for block: primary anesthetic   Epidural  Skin Anesthetic: lidocaine 1%  Administration type: continuous  Approach: midline  Interspace: L3-4    Injection technique: EFRAIN saline  Needle and Epidural Catheter  Needle type: Tuohy   Needle gauge: 18  Needle length: 3.5 inches  Catheter type: end hole  Insertion Attempts: 1  Test dose: 3 mL of lidocaine 1.5% with Epi 1-to-200,000  Additional Documentation: incremental injection  Needle localization: anatomical landmarks  Assessment  Ease of block: easy  Patient's tolerance of the procedure: comfortable throughout block No inadvertent dural puncture with Tuohy.  Dural puncture not performed with spinal needle    Medications:    Medications: ROPIvacaine (NAROPIN) injection 0.75% - Epidural   5 mL - 12/13/2022 8:53:00 AM

## 2022-12-13 NOTE — ANESTHESIA PREPROCEDURE EVALUATION
12/13/2022  Lorenza Santiago is a 27 y.o., female.      Pre-op Assessment    I have reviewed the Patient Summary Reports.     I have reviewed the Nursing Notes. I have reviewed the NPO Status.   I have reviewed the Medications.     Review of Systems  Anesthesia Hx:  No problems with previous Anesthesia    Social:  Non-Smoker, No Alcohol Use    Hematology/Oncology:  Hematology Normal   Oncology Normal     EENT/Dental:EENT/Dental Normal   Cardiovascular:  Cardiovascular Normal     Pulmonary:  Pulmonary Normal    Renal/:  Renal/ Normal     Hepatic/GI:  Hepatic/GI Normal    Musculoskeletal:  Musculoskeletal Normal    OB/GYN/PEDS:  Iup, term   Neurological:  Neurology Normal    Endocrine:  Endocrine Normal    Dermatological:  Skin Normal    Psych:  Psychiatric Normal           Physical Exam  General: Well nourished    Airway:  Mallampati: II / II  Mouth Opening: Normal  TM Distance: > 6 cm  Tongue: Normal  Neck ROM: Normal ROM    Chest/Lungs:  Clear to auscultation, Normal Respiratory Rate    Heart:  Rate: Normal  Rhythm: Regular Rhythm        Anesthesia Plan  Type of Anesthesia, risks & benefits discussed:    Anesthesia Type: Epidural  Intra-op Monitoring Plan: Standard ASA Monitors  Post Op Pain Control Plan: epidural analgesia  Informed Consent: Informed consent signed with the Patient and all parties understand the risks and agree with anesthesia plan.  All questions answered.   ASA Score: 2  Day of Surgery Review of History & Physical: H&P Update referred to the surgeon/provider.I have interviewed and examined the patient. I have reviewed the patient's H&P dated: There are no significant changes. H&P completed by Anesthesiologist.    Ready For Surgery From Anesthesia Perspective.     .

## 2022-12-14 LAB
BASOPHILS # BLD AUTO: 0.04 K/UL (ref 0–0.2)
BASOPHILS NFR BLD AUTO: 0.2 % (ref 0–1)
DIFFERENTIAL METHOD BLD: ABNORMAL
EOSINOPHIL # BLD AUTO: 0.06 K/UL (ref 0–0.5)
EOSINOPHIL NFR BLD AUTO: 0.4 % (ref 1–4)
ERYTHROCYTE [DISTWIDTH] IN BLOOD BY AUTOMATED COUNT: 14 % (ref 11.5–14.5)
HCT VFR BLD AUTO: 28.3 % (ref 38–47)
HGB BLD-MCNC: 9.2 G/DL (ref 12–16)
IMM GRANULOCYTES # BLD AUTO: 0.1 K/UL (ref 0–0.04)
IMM GRANULOCYTES NFR BLD: 0.6 % (ref 0–0.4)
LYMPHOCYTES # BLD AUTO: 3.05 K/UL (ref 1–4.8)
LYMPHOCYTES NFR BLD AUTO: 18.8 % (ref 27–41)
MCH RBC QN AUTO: 28.7 PG (ref 27–31)
MCHC RBC AUTO-ENTMCNC: 32.5 G/DL (ref 32–36)
MCV RBC AUTO: 88.2 FL (ref 80–96)
MONOCYTES # BLD AUTO: 1.44 K/UL (ref 0–0.8)
MONOCYTES NFR BLD AUTO: 8.9 % (ref 2–6)
MPC BLD CALC-MCNC: 11.2 FL (ref 9.4–12.4)
NEUTROPHILS # BLD AUTO: 11.55 K/UL (ref 1.8–7.7)
NEUTROPHILS NFR BLD AUTO: 71.1 % (ref 53–65)
NRBC # BLD AUTO: 0 X10E3/UL
NRBC, AUTO (.00): 0 %
PLATELET # BLD AUTO: 294 K/UL (ref 150–400)
RBC # BLD AUTO: 3.21 M/UL (ref 4.2–5.4)
WBC # BLD AUTO: 16.24 K/UL (ref 4.5–11)

## 2022-12-14 PROCEDURE — 85025 COMPLETE CBC W/AUTO DIFF WBC: CPT | Performed by: STUDENT IN AN ORGANIZED HEALTH CARE EDUCATION/TRAINING PROGRAM

## 2022-12-14 PROCEDURE — 25000003 PHARM REV CODE 250: Performed by: STUDENT IN AN ORGANIZED HEALTH CARE EDUCATION/TRAINING PROGRAM

## 2022-12-14 PROCEDURE — 36415 COLL VENOUS BLD VENIPUNCTURE: CPT | Performed by: STUDENT IN AN ORGANIZED HEALTH CARE EDUCATION/TRAINING PROGRAM

## 2022-12-14 PROCEDURE — 11000001 HC ACUTE MED/SURG PRIVATE ROOM

## 2022-12-14 RX ORDER — HYDROCODONE BITARTRATE AND ACETAMINOPHEN 5; 325 MG/1; MG/1
1 TABLET ORAL EVERY 6 HOURS PRN
Qty: 5 TABLET | Refills: 0 | Status: SHIPPED | OUTPATIENT
Start: 2022-12-14 | End: 2024-03-26

## 2022-12-14 RX ORDER — DOCUSATE SODIUM 100 MG/1
200 CAPSULE, LIQUID FILLED ORAL 2 TIMES DAILY
Status: DISCONTINUED | OUTPATIENT
Start: 2022-12-14 | End: 2022-12-15 | Stop reason: HOSPADM

## 2022-12-14 RX ORDER — IBUPROFEN 600 MG/1
600 TABLET ORAL EVERY 6 HOURS PRN
Status: DISCONTINUED | OUTPATIENT
Start: 2022-12-14 | End: 2022-12-15 | Stop reason: HOSPADM

## 2022-12-14 RX ADMIN — DOCUSATE SODIUM 200 MG: 100 CAPSULE, LIQUID FILLED ORAL at 09:12

## 2022-12-14 RX ADMIN — IBUPROFEN 600 MG: 600 TABLET, FILM COATED ORAL at 07:12

## 2022-12-14 RX ADMIN — HYDROCORTISONE: 25 CREAM TOPICAL at 09:12

## 2022-12-14 RX ADMIN — IBUPROFEN 600 MG: 600 TABLET, FILM COATED ORAL at 05:12

## 2022-12-14 RX ADMIN — Medication 1 TABLET: at 09:12

## 2022-12-14 NOTE — SUBJECTIVE & OBJECTIVE
Interval History: PPD1 s/p VAVD    She is doing well this morning. She is tolerating a regular diet without nausea or vomiting. She is voiding spontaneously. She is ambulating. She has passed flatus, and has not a BM. Vaginal bleeding is mild. She denies fever or chills. Abdominal pain is mild and controlled with oral medications. She Is breastfeeding. She desires circumcision for her male baby: yes.    Objective:     Vital Signs (Most Recent):  Temp: 98.2 °F (36.8 °C) (12/14/22 1220)  Pulse: 72 (12/14/22 1220)  Resp: 18 (12/14/22 1220)  BP: 112/62 (12/14/22 1220)  SpO2: 99 % (12/13/22 2305) Vital Signs (24h Range):  Temp:  [97.2 °F (36.2 °C)-98.2 °F (36.8 °C)] 98.2 °F (36.8 °C)  Pulse:  [70-98] 72  Resp:  [18] 18  SpO2:  [91 %-100 %] 99 %  BP: (101-124)/(51-84) 112/62     Weight: 87.1 kg (192 lb)  Body mass index is 32.96 kg/m².    No intake or output data in the 24 hours ending 12/14/22 1500      Significant Labs:  Lab Results   Component Value Date    GROUPTRH A POS 12/13/2022    HEPBSAG Non-Reactive 12/13/2022    AFP 25.1 06/30/2022     Recent Labs   Lab 12/14/22  0746   HGB 9.2*   HCT 28.3*       CBC:   Recent Labs   Lab 12/14/22  0746   WBC 16.24*   RBC 3.21*   HGB 9.2*   HCT 28.3*      MCV 88.2   MCH 28.7   MCHC 32.5     I have personallly reviewed all pertinent lab results from the last 24 hours.  Recent Lab Results         12/14/22  0746        Baso # 0.04       Basophil % 0.2       Differential Type Auto       Eos # 0.06       Eosinophil % 0.4       HEMATOCRIT 28.3       HEMOGLOBIN 9.2       Immature Grans (Abs) 0.10       Immature Granulocytes 0.6       Lymph # 3.05       Lymph % 18.8       MCH 28.7       MCHC 32.5       MCV 88.2       Mono # 1.44       Mono % 8.9       MPV 11.2       Neutrophils, Abs 11.55       Neutrophils Relative 71.1       nRBC 0.0       NUCLEATED RBC ABSOLUTE 0.00       Platelets 294       RBC 3.21       RDW 14.0       WBC 16.24               Physical Exam:   Constitutional:  She is oriented to person, place, and time. She appears well-developed and well-nourished.    HENT:   Head: Normocephalic and atraumatic.    Eyes: Pupils are equal, round, and reactive to light.     Cardiovascular:  Normal rate.      Exam reveals no clubbing, no cyanosis and no edema.        Pulmonary/Chest: Effort normal.        Abdominal: Soft.     Genitourinary:    Uterus normal.             Musculoskeletal: Normal range of motion and moves all extremeties. No edema.       Neurological: She is alert and oriented to person, place, and time.    Skin: Skin is warm and dry. No cyanosis. Nails show no clubbing.    Psychiatric: She has a normal mood and affect. Her behavior is normal. Judgment and thought content normal.     Review of Systems   All other systems reviewed and are negative.

## 2022-12-14 NOTE — LACTATION NOTE
This note was copied from a baby's chart.  Out to assist mom with latching, infant unable to get a good latch, mom given a nipple shield for flat nipples, instructed on use, and cleaning, infant latched with a few sucks. Mom to call with any needs

## 2022-12-14 NOTE — PROGRESS NOTES
[facility] -  Labor and Delivery  Obstetrics  Postpartum Progress Note    Patient Name: Lorenza Santiago  MRN: [id number]  Admission Date: 12/12/2022  Hospital Length of Stay: 1 days  Attending Physician: Onel Beltran, *  Primary Care Provider: Primary Doctor No    Subjective:     Principal Problem:Encounter for elective induction of labor    Hospital Course:  No notes on file    Interval History: PPD1 s/p VAVD    She is doing well this morning. She is tolerating a regular diet without nausea or vomiting. She is voiding spontaneously. She is ambulating. She has passed flatus, and has not a BM. Vaginal bleeding is mild. She denies fever or chills. Abdominal pain is mild and controlled with oral medications. She Is breastfeeding. She desires circumcision for her male baby: yes.    Objective:     Vital Signs (Most Recent):  Temp: 98.2 °F (36.8 °C) (12/14/22 1220)  Pulse: 72 (12/14/22 1220)  Resp: 18 (12/14/22 1220)  BP: 112/62 (12/14/22 1220)  SpO2: 99 % (12/13/22 2305) Vital Signs (24h Range):  Temp:  [97.2 °F (36.2 °C)-98.2 °F (36.8 °C)] 98.2 °F (36.8 °C)  Pulse:  [70-98] 72  Resp:  [18] 18  SpO2:  [91 %-100 %] 99 %  BP: (101-124)/(51-84) 112/62     Weight: 87.1 kg (192 lb)  Body mass index is 32.96 kg/m².    No intake or output data in the 24 hours ending 12/14/22 1500      Significant Labs:  Lab Results   Component Value Date    GROUPTRH A POS 12/13/2022    HEPBSAG Non-Reactive 12/13/2022    AFP 25.1 06/30/2022     Recent Labs   Lab 12/14/22  0746   HGB 9.2*   HCT 28.3*       CBC:   Recent Labs   Lab 12/14/22  0746   WBC 16.24*   RBC 3.21*   HGB 9.2*   HCT 28.3*      MCV 88.2   MCH 28.7   MCHC 32.5     I have personallly reviewed all pertinent lab results from the last 24 hours.  Recent Lab Results         12/14/22  0746        Baso # 0.04       Basophil % 0.2       Differential Type Auto       Eos # 0.06       Eosinophil % 0.4       HEMATOCRIT 28.3       HEMOGLOBIN 9.2       Immature  Grans (Abs) 0.10       Immature Granulocytes 0.6       Lymph # 3.05       Lymph % 18.8       MCH 28.7       MCHC 32.5       MCV 88.2       Mono # 1.44       Mono % 8.9       MPV 11.2       Neutrophils, Abs 11.55       Neutrophils Relative 71.1       nRBC 0.0       NUCLEATED RBC ABSOLUTE 0.00       Platelets 294       RBC 3.21       RDW 14.0       WBC 16.24               Physical Exam:   Constitutional: She is oriented to person, place, and time. She appears well-developed and well-nourished.    HENT:   Head: Normocephalic and atraumatic.    Eyes: Pupils are equal, round, and reactive to light.     Cardiovascular:  Normal rate.      Exam reveals no clubbing, no cyanosis and no edema.        Pulmonary/Chest: Effort normal.        Abdominal: Soft.     Genitourinary:    Uterus normal.             Musculoskeletal: Normal range of motion and moves all extremeties. No edema.       Neurological: She is alert and oriented to person, place, and time.    Skin: Skin is warm and dry. No cyanosis. Nails show no clubbing.    Psychiatric: She has a normal mood and affect. Her behavior is normal. Judgment and thought content normal.     Review of Systems   All other systems reviewed and are negative.    Assessment/Plan:     27 y.o. female  for:    * Encounter for elective induction of labor  Anticipate vaginal delivery    Postpartum care following vaginal delivery  Meeting all milestones  Hgb stable    Anxiety in pregnancy in third trimester, antepartum  Currently on zoloft    39 weeks gestation of pregnancy           Disposition: As patient meets milestones, will plan to discharge tomorrow.    Onel Beltran,   Obstetrics  [facility] -  Labor and Delivery

## 2022-12-14 NOTE — L&D DELIVERY NOTE
"[facility] -  Labor and Delivery  Vaginal Delivery   Operative Note    SUMMARY     Vacuum assisted vaginal delivery of live infant, was placed on mothers abdomen for skin to skin and bulb suctioning performed.  Infant delivered position JERRY over intact perineum.  Nuchal cord: Yes, cord reduced at perineum.    Spontaneous delivery of placenta and IV pitocin given noting uterine atony with bleeding. Received TXA.   2nd degree laceration noted and repaired in normal fashion with 3-0 and 2-0 vicryl . Left labial laceration repaired with 3-0 chromic.  Patient tolerated delivery well. Sponge needle and lap counted correctly x2.        Indications: Encounter for elective induction of labor  Pregnancy complicated by:   Patient Active Problem List   Diagnosis    39 weeks gestation of pregnancy    History of miscarriage, currently pregnant    Decreased fetal movement affecting management of pregnancy in third trimester    Anxiety in pregnancy in third trimester, antepartum    Encounter for elective induction of labor     Admitting GA: 39w5d    Delivery Information for Patricio Santiago    Birth information:  YOB: 2020   Time of birth: 7:14 PM   Sex: male   Head Delivery Date/Time: 12/13/2022  7:14 PM   Delivery type: Vaginal, Spontaneous   Gestational Age: 39w5d    Delivery Providers    Delivering clinician: Onel Beltran DO           Measurements    Weight: 3678 g  Weight (lbs): 8 lb 1.7 oz  Length: 53.3 cm  Length (in): 21"         Apgars    Living status: Living  Apgars:  1 min.:  5 min.:  10 min.:  15 min.:  20 min.:    Skin color:  0  1       Heart rate:  2  2       Reflex irritability:  2  2       Muscle tone:  2  2       Respiratory effort:  2  2       Total:  8  9       Apgars assigned by: ADRIAN ANN RN         Operative Delivery    Forceps attempted?: No  Vacuum extractor attempted?: Yes  Vacuum indications: Maternal Fatigue, Prolonged Labor  Vacuum type: cupped  Vacuum " application location: Low  Vacuum applied by: DR. XAVIER  Failed vacuum delivery?: No         Shoulder Dystocia    Shoulder dystocia present?: No           Presentation    Presentation: Vertex  Position: Right Occiput Posterior           Interventions/Resuscitation    Method: Bulb Suctioning, Tactile Stimulation       Cord    Vessels: 3 vessels  Complications: Nuchal  Nuchal Intervention: reduced  Nuchal Cord Description: loose nuchal cord  Number of Loops: 1  Cord Blood Disposition: Lab  Gases Sent?: Yes       Placenta    Placenta delivery date/time: 2022  Placenta removal: Spontaneous  Placenta appearance: Intact  Placenta disposition: discarded           Labor Events:       labor: No     Labor Onset Date/Time: 2022 08:04     Dilation Complete Date/Time: 2022 16:27     Start Pushing Date/Time: 2022 16:27     Rupture Date/Time: 22         Rupture type: ARM (Artificial Rupture)           Fluid Amount:         Fluid Color: Clear         steroids:       Antibiotics given for GBS: No     Induction: amniotomy;oxytocin;misoprostol     Indications for induction:  Elective     Augmentation:       Indications for augmentation:       Labor complications: None     Additional complications:          Cervical ripening:                     Delivery:      Episiotomy: None     Indication for Episiotomy:       Perineal Lacerations: 2nd Repaired:      Periurethral Laceration:   Repaired:     Labial Laceration: left Repaired: Yes   Sulcus Laceration:   Repaired:     Vaginal Laceration:   Repaired:     Cervical Laceration:   Repaired:     Repair suture:       Repair # of packets:       Last Value - EBL - Nursing (mL):       Sum - EBL - Nursing (mL): 0     Last Value - EBL - Anesthesia (mL):        Calculated QBL (mL):         Vaginal Sweep Performed: Yes     Surgicount Correct: Yes       Other providers:       Anesthesia    Method: Epidural          Details (if  applicable):  Trial of Labor      Categorization:      Priority:     Indications for :     Incision Type:       Additional  information:  Forceps:    Vacuum:    Breech:    Observed anomalies    Other (Comments):

## 2022-12-14 NOTE — ANESTHESIA POSTPROCEDURE EVALUATION
Anesthesia Post Evaluation    Patient: Lorenza Santiago    Procedure(s) Performed: * No procedures listed *    Final Anesthesia Type: epidural      Patient location during evaluation: labor & delivery  Patient participation: Yes- Able to Participate  Level of consciousness: awake and sedated  Post-procedure vital signs: reviewed and stable  Pain management: adequate  Airway patency: patent    PONV status at discharge: No PONV  Anesthetic complications: no      Cardiovascular status: blood pressure returned to baseline  Respiratory status: unassisted  Hydration status: euvolemic  Follow-up not needed.          Vitals Value Taken Time   /64 12/13/22 2305   Temp 36.3 °C (97.3 °F) 12/13/22 2305   Pulse 74 12/13/22 2305   Resp 18 12/13/22 2305   SpO2 99 % 12/13/22 2305         No case tracking events are documented in the log.      Pain/Vale Score: Pain Rating Prior to Med Admin: 4 (12/14/2022  5:54 AM)  Pain Rating Post Med Admin: 0 (12/14/2022  1:11 AM)

## 2022-12-15 VITALS
OXYGEN SATURATION: 99 % | WEIGHT: 192 LBS | DIASTOLIC BLOOD PRESSURE: 63 MMHG | HEIGHT: 64 IN | TEMPERATURE: 98 F | SYSTOLIC BLOOD PRESSURE: 119 MMHG | RESPIRATION RATE: 18 BRPM | BODY MASS INDEX: 32.78 KG/M2 | HEART RATE: 63 BPM

## 2022-12-15 LAB
HCO3 UR-SCNC: 16.7 MMOL/L (ref 24–28)
HCO3 UR-SCNC: 20.7 MMOL/L
PCO2 BLDA: 31 MMHG (ref 41–51)
PCO2 BLDA: 58 MMHG (ref 35–48)
PH SMN: 7.16 [PH] (ref 7.35–7.45)
PH SMN: 7.34 [PH]
PO2 BLDA: 32 MMHG
PO2 BLDA: 51 MMHG (ref 25–40)
POC A-ADO2: ABNORMAL MMHG
POC BASE EXCESS: -7.9 MMOL/L (ref -2–2)
POC BASE EXCESS: -8.6 MMOL/L (ref -2–3)
POC SATURATED O2: 49.9 % (ref 95–98)
POC SATURATED O2: 86.8 %
UA COMPLETE W REFLEX CULTURE PNL UR: NORMAL

## 2022-12-15 PROCEDURE — 25000003 PHARM REV CODE 250: Performed by: STUDENT IN AN ORGANIZED HEALTH CARE EDUCATION/TRAINING PROGRAM

## 2022-12-15 RX ADMIN — DOCUSATE SODIUM 200 MG: 100 CAPSULE, LIQUID FILLED ORAL at 09:12

## 2022-12-15 RX ADMIN — Medication 1 TABLET: at 09:12

## 2022-12-15 RX ADMIN — IBUPROFEN 600 MG: 600 TABLET, FILM COATED ORAL at 09:12

## 2022-12-19 NOTE — DISCHARGE SUMMARY
[facility] -  Labor and Delivery  Obstetrics  Discharge Summary      Patient Name: Lorenza Santiago  MRN: [id number]  Admission Date: 2022  Hospital Length of Stay: 2 days  Discharge Date and Time: 12/15/2022 10:45 AM  Attending Physician: No att. providers found   Discharging Provider: Onel Beltran DO   Primary Care Provider: Primary Doctor Tiana    HPI: 27 y.o.  at 39w5d presents to L&D for scheduled IOL. She denies vaginal bleeding, LOF, ctx. Reports good fetal movement. Prenatal care with Dr. Beltran. Pregnancy complicated by anxiety              * No surgery found *     Hospital Course:   27 y.o.  at 39w5d presented for scheduled IOL. She ultimately delivered via VAVD. See delivery note for details.    Postpartum course was uncomplicated. Hgb stable. She met all postpartum milestones and was discharged on PPD2. Follow up outpatient with Dr. Beltran.               Final Active Diagnoses:    Diagnosis Date Noted POA    PRINCIPAL PROBLEM:  Encounter for elective induction of labor [Z34.90] 2022 Not Applicable    Postpartum care following vaginal delivery [Z39.2] 2022 Not Applicable    Anxiety in pregnancy in third trimester, antepartum [O99.343, F41.9] 2022 Yes    39 weeks gestation of pregnancy [Z3A.39] 2022 Not Applicable      Problems Resolved During this Admission:        Significant Diagnostic Studies: Labs: CBC No results for input(s): WBC, HGB, HCT, PLT in the last 48 hours. and All labs within the past 24 hours have been reviewed      Feeding Method: breast    Immunizations     Date Immunization Status Dose Route/Site Given by    12/15/22 1331 MMR Deleted 0.5 mL Subcutaneous/     12/15/22 1331 Tdap Deleted 0.5 mL Intramuscular/           Delivery:    Episiotomy: None   Lacerations: 2nd   Repair suture:     Repair # of packets:     Blood loss (ml):       Birth information:  YOB: 2020   Time of birth: 7:14 PM   Sex: male    Delivery type: Vaginal, Spontaneous   Gestational Age: 39w5d    Delivery Clinician:      Other providers:       Additional  information:  Forceps:    Vacuum:    Breech:    Observed anomalies      Living?:           APGARS  One minute Five minutes Ten minutes   Skin color:         Heart rate:         Grimace:         Muscle tone:         Breathing:         Totals: 8  9        Placenta: Delivered:       appearance    Pending Diagnostic Studies:     None          Discharged Condition: good    Disposition: Home or Self Care    Follow Up:   Follow-up Information     Onel Beltran,  Follow up in 3 week(s).    Specialty: Obstetrics and Gynecology  Why: postpartum  Contact information:  [address] [place] [id number]  [phone]                       Patient Instructions:      Pelvic Rest     Notify your health care provider if you experience any of the following:  increased confusion or weakness     Notify your health care provider if you experience any of the following:  persistent dizziness, light-headedness, or visual disturbances     Notify your health care provider if you experience any of the following:  worsening rash     Notify your health care provider if you experience any of the following:  severe persistent headache     Notify your health care provider if you experience any of the following:  difficulty breathing or increased cough     Notify your health care provider if you experience any of the following:  redness, tenderness, or signs of infection (pain, swelling, redness, odor or green/yellow discharge around incision site)     Notify your health care provider if you experience any of the following:  severe uncontrolled pain     Notify your health care provider if you experience any of the following:  persistent nausea and vomiting or diarrhea     Notify your health care provider if you experience any of the following:  temperature >100.4     Medications:  Discharge Medication List as of  12/15/2022  9:33 AM      START taking these medications    Details   HYDROcodone-acetaminophen (NORCO) 5-325 mg per tablet Take 1 tablet by mouth every 6 (six) hours as needed for Pain., Starting Wed 12/14/2022, Normal         CONTINUE these medications which have NOT CHANGED    Details   prenatal vit no.124/iron/folic (PRENATAL VITAMIN ORAL) Take 1 capsule by mouth once daily at 6am., Historical Med         STOP taking these medications       clindamycin (CLEOCIN) 150 MG capsule Comments:   Reason for Stopping:               Onel Beltran,   Obstetrics  [facility] -  Labor and Delivery

## 2022-12-19 NOTE — HOSPITAL COURSE
27 y.o.  at 39w5d presented for scheduled IOL. She ultimately delivered via VAVD. See delivery note for details.    Postpartum course was uncomplicated. Hgb stable. She met all postpartum milestones and was discharged on PPD2. Follow up outpatient with Dr. Beltran.

## 2022-12-26 PROBLEM — O36.8130 DECREASED FETAL MOVEMENT AFFECTING MANAGEMENT OF PREGNANCY IN THIRD TRIMESTER: Status: RESOLVED | Noted: 2022-09-22 | Resolved: 2022-12-26

## 2022-12-27 ENCOUNTER — PATIENT MESSAGE (OUTPATIENT)
Dept: OBSTETRICS AND GYNECOLOGY | Facility: CLINIC | Age: 27
End: 2022-12-27
Payer: COMMERCIAL

## 2023-01-05 ENCOUNTER — POSTPARTUM VISIT (OUTPATIENT)
Dept: OBSTETRICS AND GYNECOLOGY | Facility: CLINIC | Age: 28
End: 2023-01-05
Payer: COMMERCIAL

## 2023-01-05 VITALS
RESPIRATION RATE: 16 BRPM | DIASTOLIC BLOOD PRESSURE: 70 MMHG | BODY MASS INDEX: 30.39 KG/M2 | HEIGHT: 64 IN | SYSTOLIC BLOOD PRESSURE: 118 MMHG | WEIGHT: 178 LBS

## 2023-01-05 PROCEDURE — 0503F POSTPARTUM CARE VISIT: CPT | Mod: ,,, | Performed by: STUDENT IN AN ORGANIZED HEALTH CARE EDUCATION/TRAINING PROGRAM

## 2023-01-05 PROCEDURE — 99213 OFFICE O/P EST LOW 20 MIN: CPT | Mod: PBBFAC | Performed by: STUDENT IN AN ORGANIZED HEALTH CARE EDUCATION/TRAINING PROGRAM

## 2023-01-05 PROCEDURE — 0503F PR POSTPARTUM CARE VISIT: ICD-10-PCS | Mod: ,,, | Performed by: STUDENT IN AN ORGANIZED HEALTH CARE EDUCATION/TRAINING PROGRAM

## 2023-01-05 NOTE — PROGRESS NOTES
Subjective:      Lorenza Santiago is a 27 y.o. female who presents for a postpartum visit. She is 3 weeks postpartum.  Outcome: spontaneous vaginal delivery. Anesthesia: epidural. Postpartum course has been uncomplicated. Baby's course has been uncomplicated. Baby is feeding by breast. Bleeding  light . Bowel function is normal. Bladder function is normal. Patient is not sexually active. Contraception method is abstinence. Postpartum depression screening: negative.    The following portions of the patient's history were reviewed and updated as appropriate: allergies, current medications, past family history, past medical history, past social history, past surgical history, and problem list.    Review of Systems  Pertinent items are noted in HPI.     Objective:      /80   Wt 82.6 kg (182 lb)   LMP 03/15/2022 (Approximate)   BMI 35.54 kg/m²    General:  alert, appears stated age, and cooperative    Breasts:  deferred   Lungs: unlabored   Abdomen: Soft, non-tender          Assessment:      Normal postpartum exam.     Plan:      1. Contraception: abstinence, discussed options  2. Follow up in: 3 weeks or as needed.

## 2023-01-13 PROBLEM — Z34.90 ENCOUNTER FOR ELECTIVE INDUCTION OF LABOR: Status: RESOLVED | Noted: 2022-12-13 | Resolved: 2023-01-13

## 2023-01-13 PROBLEM — F41.9 ANXIETY IN PREGNANCY IN THIRD TRIMESTER, ANTEPARTUM: Status: RESOLVED | Noted: 2022-11-16 | Resolved: 2023-01-13

## 2023-01-13 PROBLEM — Z3A.39 39 WEEKS GESTATION OF PREGNANCY: Status: RESOLVED | Noted: 2022-04-28 | Resolved: 2023-01-13

## 2023-01-13 PROBLEM — O09.299 HISTORY OF MISCARRIAGE, CURRENTLY PREGNANT: Status: RESOLVED | Noted: 2022-04-28 | Resolved: 2023-01-13

## 2023-01-13 PROBLEM — O99.343 ANXIETY IN PREGNANCY IN THIRD TRIMESTER, ANTEPARTUM: Status: RESOLVED | Noted: 2022-11-16 | Resolved: 2023-01-13

## 2023-01-18 ENCOUNTER — PATIENT MESSAGE (OUTPATIENT)
Dept: OBSTETRICS AND GYNECOLOGY | Facility: CLINIC | Age: 28
End: 2023-01-18
Payer: COMMERCIAL

## 2023-02-01 ENCOUNTER — POSTPARTUM VISIT (OUTPATIENT)
Dept: OBSTETRICS AND GYNECOLOGY | Facility: CLINIC | Age: 28
End: 2023-02-01
Payer: COMMERCIAL

## 2023-02-01 VITALS
OXYGEN SATURATION: 99 % | TEMPERATURE: 98 F | HEIGHT: 64 IN | SYSTOLIC BLOOD PRESSURE: 128 MMHG | DIASTOLIC BLOOD PRESSURE: 69 MMHG | WEIGHT: 165.38 LBS | HEART RATE: 64 BPM | BODY MASS INDEX: 28.24 KG/M2 | RESPIRATION RATE: 17 BRPM

## 2023-02-01 DIAGNOSIS — N89.8 VAGINAL DISCHARGE: ICD-10-CM

## 2023-02-01 LAB
CANDIDA SPECIES: NEGATIVE
GARDNERELLA: POSITIVE
TRICHOMONAS: NEGATIVE

## 2023-02-01 PROCEDURE — 0503F PR POSTPARTUM CARE VISIT: ICD-10-PCS | Mod: ,,, | Performed by: STUDENT IN AN ORGANIZED HEALTH CARE EDUCATION/TRAINING PROGRAM

## 2023-02-01 PROCEDURE — 87660 BACTERIAL VAGINOSIS: ICD-10-PCS | Mod: ,,, | Performed by: CLINICAL MEDICAL LABORATORY

## 2023-02-01 PROCEDURE — 87510 GARDNER VAG DNA DIR PROBE: CPT | Mod: ,,, | Performed by: CLINICAL MEDICAL LABORATORY

## 2023-02-01 PROCEDURE — 87480 CANDIDA DNA DIR PROBE: CPT | Mod: ,,, | Performed by: CLINICAL MEDICAL LABORATORY

## 2023-02-01 PROCEDURE — 87480 BACTERIAL VAGINOSIS: ICD-10-PCS | Mod: ,,, | Performed by: CLINICAL MEDICAL LABORATORY

## 2023-02-01 PROCEDURE — 99213 OFFICE O/P EST LOW 20 MIN: CPT | Mod: PBBFAC | Performed by: STUDENT IN AN ORGANIZED HEALTH CARE EDUCATION/TRAINING PROGRAM

## 2023-02-01 PROCEDURE — 87510 BACTERIAL VAGINOSIS: ICD-10-PCS | Mod: ,,, | Performed by: CLINICAL MEDICAL LABORATORY

## 2023-02-01 PROCEDURE — 87660 TRICHOMONAS VAGIN DIR PROBE: CPT | Mod: ,,, | Performed by: CLINICAL MEDICAL LABORATORY

## 2023-02-01 PROCEDURE — 0503F POSTPARTUM CARE VISIT: CPT | Mod: ,,, | Performed by: STUDENT IN AN ORGANIZED HEALTH CARE EDUCATION/TRAINING PROGRAM

## 2023-02-02 RX ORDER — METRONIDAZOLE 500 MG/1
500 TABLET ORAL EVERY 12 HOURS
Qty: 14 TABLET | Refills: 0 | Status: SHIPPED | OUTPATIENT
Start: 2023-02-02 | End: 2023-02-09

## 2024-03-22 DIAGNOSIS — Z36.89 ENCOUNTER TO ESTABLISH GESTATIONAL AGE USING ULTRASOUND: Primary | ICD-10-CM

## 2024-03-25 ENCOUNTER — INITIAL PRENATAL (OUTPATIENT)
Dept: OBSTETRICS AND GYNECOLOGY | Facility: CLINIC | Age: 29
End: 2024-03-25
Payer: COMMERCIAL

## 2024-03-25 ENCOUNTER — HOSPITAL ENCOUNTER (OUTPATIENT)
Dept: RADIOLOGY | Facility: HOSPITAL | Age: 29
Discharge: HOME | End: 2024-03-25
Attending: STUDENT IN AN ORGANIZED HEALTH CARE EDUCATION/TRAINING PROGRAM
Payer: COMMERCIAL

## 2024-03-25 VITALS
HEART RATE: 72 BPM | BODY MASS INDEX: 30.55 KG/M2 | DIASTOLIC BLOOD PRESSURE: 68 MMHG | WEIGHT: 178 LBS | SYSTOLIC BLOOD PRESSURE: 108 MMHG

## 2024-03-25 DIAGNOSIS — R12 HEARTBURN DURING PREGNANCY IN FIRST TRIMESTER: Primary | ICD-10-CM

## 2024-03-25 DIAGNOSIS — Z11.3 SCREEN FOR STD (SEXUALLY TRANSMITTED DISEASE): ICD-10-CM

## 2024-03-25 DIAGNOSIS — Z3A.08 8 WEEKS GESTATION OF PREGNANCY: ICD-10-CM

## 2024-03-25 DIAGNOSIS — Z87.59 HISTORY OF VACUUM EXTRACTION ASSISTED DELIVERY: ICD-10-CM

## 2024-03-25 DIAGNOSIS — Z36.89 ENCOUNTER TO ESTABLISH GESTATIONAL AGE USING ULTRASOUND: ICD-10-CM

## 2024-03-25 DIAGNOSIS — O26.891 HEARTBURN DURING PREGNANCY IN FIRST TRIMESTER: Primary | ICD-10-CM

## 2024-03-25 PROCEDURE — 87661 TRICHOMONAS VAGINALIS AMPLIF: CPT | Mod: ,,, | Performed by: CLINICAL MEDICAL LABORATORY

## 2024-03-25 PROCEDURE — G0481 DRUG TEST DEF 8-14 CLASSES: HCPCS | Mod: ,,, | Performed by: CLINICAL MEDICAL LABORATORY

## 2024-03-25 PROCEDURE — 99213 OFFICE O/P EST LOW 20 MIN: CPT | Mod: PBBFAC,25 | Performed by: STUDENT IN AN ORGANIZED HEALTH CARE EDUCATION/TRAINING PROGRAM

## 2024-03-25 PROCEDURE — 76801 OB US < 14 WKS SINGLE FETUS: CPT | Mod: 26,,, | Performed by: RADIOLOGY

## 2024-03-25 PROCEDURE — 87591 N.GONORRHOEAE DNA AMP PROB: CPT | Mod: ,,, | Performed by: CLINICAL MEDICAL LABORATORY

## 2024-03-25 PROCEDURE — 87491 CHLMYD TRACH DNA AMP PROBE: CPT | Mod: ,,, | Performed by: CLINICAL MEDICAL LABORATORY

## 2024-03-25 PROCEDURE — 87086 URINE CULTURE/COLONY COUNT: CPT | Mod: ,,, | Performed by: CLINICAL MEDICAL LABORATORY

## 2024-03-25 PROCEDURE — 0501F PRENATAL FLOW SHEET: CPT | Mod: S$PBB,,, | Performed by: STUDENT IN AN ORGANIZED HEALTH CARE EDUCATION/TRAINING PROGRAM

## 2024-03-25 PROCEDURE — 76801 OB US < 14 WKS SINGLE FETUS: CPT | Mod: TC

## 2024-03-25 PROCEDURE — 88142 CYTOPATH C/V THIN LAYER: CPT | Mod: TC,GCY | Performed by: STUDENT IN AN ORGANIZED HEALTH CARE EDUCATION/TRAINING PROGRAM

## 2024-03-25 RX ORDER — ONDANSETRON 4 MG/1
4 TABLET, ORALLY DISINTEGRATING ORAL EVERY 6 HOURS PRN
Qty: 30 TABLET | Refills: 2 | Status: SHIPPED | OUTPATIENT
Start: 2024-03-25

## 2024-03-25 NOTE — PROGRESS NOTES
New OB History and Physical    CC:   Chief Complaint   Patient presents with    Initial Prenatal Visit     No problems           Assessment/Plan:   Lorenza Santiago is a 28 y.o. at 8w5d who presents for new OB visit    Problem List Items Addressed This Visit          Obstetric    History of vacuum extraction assisted delivery     Other Visit Diagnoses       Heartburn during pregnancy in first trimester    -  Primary    8 weeks gestation of pregnancy        Relevant Orders    CBC Auto Differential (Completed)    Basic Metabolic Panel (Completed)    ThinPrep Pap Test    Hepatitis B Surface Antigen (Completed)    Rubella Antibody Screen (Completed)    Treponema Pallidum (Syphillis) Antibody (Completed)    Urine culture (Completed)    Type & Screen (Completed)    HIV 1/2 Ag/Ab (4th Gen) (Completed)    OB Drug Screen Definitive, Urine    Hepatitis C Antibody (Completed)    Screen for STD (sexually transmitted disease)        Relevant Orders    Chlamydia/GC, PCR (Completed)    Trichomonas vaginalis by PCR (Completed)            HPI: Lorenza Santiago is a 28 y.o. at 8w5d who presents for new OB visit. C/o heartburn and some nausea.    Review of Systems: The following ROS was otherwise negative, except as noted in the HPI:  constitutional, HEENT, respiratory, cardiovascular, gastrointestinal, genitourinary, skin, musculoskeletal, neurological, psych    Gynecologic History: Denies hx of abnl pap smears.  No hx of STIs.    Obstetrical History:  OB History          3    Para   1    Term   1            AB   1    Living   1         SAB   1    IAB        Ectopic        Multiple   0    Live Births   1                 Past Medical History:   History reviewed. No pertinent past medical history.    Medications:  Medication List with Changes/Refills   New Medications    ONDANSETRON (ZOFRAN-ODT) 4 MG TBDL    Take 1 tablet (4 mg total) by mouth every 6 (six) hours as needed (nausea).   Current Medications     HYDROCODONE-ACETAMINOPHEN (NORCO) 5-325 MG PER TABLET    Take 1 tablet by mouth every 6 (six) hours as needed for Pain.    PRENATAL VIT NO.124/IRON/FOLIC (PRENATAL VITAMIN ORAL)    Take 1 capsule by mouth once daily at 6am.         Allergies:  Patient has no known allergies.    Surgical History:  Past Surgical History:   Procedure Laterality Date    DILATION AND CURETTAGE OF UTERUS  11/30/2022    DILATION AND CURETTAGE OF UTERUS USING SUCTION N/A 11/30/2021    Procedure: DILATION AND CURETTAGE, UTERUS, USING SUCTION;  Surgeon: Onel Beltran DO;  Location: [facility];  Service: OB/GYN;  Laterality: N/A;       Family History:  Family History   Family history unknown: Yes       Social History:  Social History     Substance and Sexual Activity   Alcohol Use Not Currently     Social History     Substance and Sexual Activity   Drug Use Never     Social History     Tobacco Use   Smoking Status Never   Smokeless Tobacco Never       Physical Exam:  /68   Pulse 72   Wt 80.7 kg (178 lb)   LMP 01/24/2024   BMI 30.55 kg/m²     General: Alert, well appearing, no acute distress  Head: Normocephalic, atraumatic  Lungs: unlabored respirations  Abdomen: Gravid, soft, nontender   Pelvic:   External: normal female genitalia, no masses or lesions   Vagina: moist, pink mucosa with rugae, physiologic discharge  Cervix: no masses or lesions, nontender, pap collected  Uterus: approx 8 weeks, mobile, midline, nontender  Adnexa: no masses or fullness, nontender  Rectovaginal: deferred  Extremities: No redness or tenderness  Skin: Well perfused, normal coloration and turgor, no lesions or rashes visualized  Neuro: Alert, oriented, normal speech, no focal deficits, moves extremities appropriately  Psych: Appropriate, normal affect, appears stated age  Osteopathic: No TART changes    Recommended Prilosec or Pepcid at HS and Tums PRN.  Rx for Zofran 4 mg ODT sent to pharmacy.   Reviewed frequency of appointments for routine  prenatal care, call group partners.  Pregnancy book given, encouraged to read through for questions regarding food/drink and medication safety in pregnancy.  Encouraged Mychart access.  Instructed to stop by the lab after visit for NOB labwork.    Breastfeeding  - Discussed benefits of breastfeeding for mother and baby and limitations of formula  - Educated mother on milk and milk production  - Encouraged to feed infant on demand  - Needs 8-12 feeds in 24 hrs  - Does not need to go more then 4-5 hours with out a feed  - Reviewed feeding cues, feeding patterns and positions  - Encouraged patient to review pregnancy guide for additional information

## 2024-03-26 PROBLEM — Z87.59 HISTORY OF VACUUM EXTRACTION ASSISTED DELIVERY: Status: ACTIVE | Noted: 2024-03-26

## 2024-03-26 LAB
CHLAMYDIA BY PCR: NEGATIVE
N. GONORRHOEAE (GC) BY PCR: NEGATIVE
TRICHOMONAS NAT: NEGATIVE

## 2024-03-27 LAB — UA COMPLETE W REFLEX CULTURE PNL UR: NORMAL

## 2024-03-28 LAB
7-AMINOCLONAZEPAM, URINE (RUSH): NEGATIVE 25 NG/ML
A-HYDROXYALPRAZOLAM, URINE (RUSH): NEGATIVE 25 NG/ML
AMITRIPTYLINE, URINE (RUSH): NEGATIVE 25 NG/ML
BENZOYLECGONINE, URINE (RUSH): NEGATIVE 100 NG/ML
CARISOPRODOL, URINE (RUSH): NEGATIVE 100 NG/ML
CODEINE, URINE (RUSH): NEGATIVE 25 NG/ML
CREAT UR-MCNC: 32 MG/DL (ref 28–219)
EDDP, URINE (RUSH): NEGATIVE 25 NG/ML
GH SERPL-MCNC: NORMAL NG/ML
HYDROCODONE, URINE (RUSH): NEGATIVE 25 NG/ML
HYDROMORPHONE, URINE (RUSH): NEGATIVE 25 NG/ML
INSULIN SERPL-ACNC: NORMAL U[IU]/ML
LAB AP CLINICAL INFORMATION: NORMAL
LAB AP GYN INTERPRETATION: NEGATIVE
LAB AP PAP DISCLAIMER COMMENTS: NORMAL
LORAZEPAM, URINE (RUSH): NORMAL
MEPROBAMATE, URINE (RUSH): NEGATIVE 100 NG/ML
METHADONE UR QL SCN: NEGATIVE 25 NG/ML
METHAMPHET UR QL SCN: NEGATIVE
MORPHINE, URINE (RUSH): NEGATIVE 25 NG/ML
NORDIAZEPAM, URINE (RUSH): NEGATIVE 25 NG/ML
NORHYDROCODONE, URINE (RUSH): NEGATIVE 50 NG/ML
NOROXYCODONE HCL, URINE (RUSH): NEGATIVE 50 NG/ML
OXAZEPAM, URINE (RUSH): NORMAL
OXYCODONE UR QL SCN: NEGATIVE 25 NG/ML
OXYMORPHONE, URINE (RUSH): NEGATIVE 25 NG/ML
PH UR STRIP: 6.5 PH UNITS
RENIN PLAS-CCNC: NORMAL NG/ML/H
SP GR UR STRIP: 1.01
TEMAZEPAM, URINE (RUSH): NEGATIVE 25 NG/ML
THC-COOH, URINE (RUSH): NEGATIVE 25 NG/ML

## 2024-04-09 ENCOUNTER — PATIENT MESSAGE (OUTPATIENT)
Dept: OBSTETRICS AND GYNECOLOGY | Facility: CLINIC | Age: 29
End: 2024-04-09
Payer: COMMERCIAL

## 2024-04-11 ENCOUNTER — HOSPITAL ENCOUNTER (OUTPATIENT)
Dept: RADIOLOGY | Facility: HOSPITAL | Age: 29
Discharge: HOME | End: 2024-04-11
Attending: STUDENT IN AN ORGANIZED HEALTH CARE EDUCATION/TRAINING PROGRAM
Payer: COMMERCIAL

## 2024-04-11 ENCOUNTER — ROUTINE PRENATAL (OUTPATIENT)
Dept: OBSTETRICS AND GYNECOLOGY | Facility: CLINIC | Age: 29
End: 2024-04-11
Payer: COMMERCIAL

## 2024-04-11 VITALS
WEIGHT: 181 LBS | DIASTOLIC BLOOD PRESSURE: 54 MMHG | HEART RATE: 65 BPM | BODY MASS INDEX: 31.07 KG/M2 | SYSTOLIC BLOOD PRESSURE: 138 MMHG

## 2024-04-11 DIAGNOSIS — O26.891 PELVIC PAIN IN PREGNANCY, ANTEPARTUM, FIRST TRIMESTER: ICD-10-CM

## 2024-04-11 DIAGNOSIS — O09.299 HISTORY OF MISCARRIAGE, CURRENTLY PREGNANT: ICD-10-CM

## 2024-04-11 DIAGNOSIS — R35.0 FREQUENCY OF URINATION: ICD-10-CM

## 2024-04-11 DIAGNOSIS — R12 HEARTBURN DURING PREGNANCY IN FIRST TRIMESTER: ICD-10-CM

## 2024-04-11 DIAGNOSIS — R10.2 PELVIC PAIN IN PREGNANCY, ANTEPARTUM, FIRST TRIMESTER: ICD-10-CM

## 2024-04-11 DIAGNOSIS — Z3A.11 11 WEEKS GESTATION OF PREGNANCY: ICD-10-CM

## 2024-04-11 DIAGNOSIS — O26.891 HEARTBURN DURING PREGNANCY IN FIRST TRIMESTER: ICD-10-CM

## 2024-04-11 DIAGNOSIS — R10.2 PELVIC PAIN IN PREGNANCY, ANTEPARTUM, FIRST TRIMESTER: Primary | ICD-10-CM

## 2024-04-11 DIAGNOSIS — O26.891 PELVIC PAIN IN PREGNANCY, ANTEPARTUM, FIRST TRIMESTER: Primary | ICD-10-CM

## 2024-04-11 DIAGNOSIS — Z87.59 HISTORY OF VACUUM EXTRACTION ASSISTED DELIVERY: ICD-10-CM

## 2024-04-11 PROCEDURE — 99999PBSHW POCT URINALYSIS W/O SCOPE: Mod: PBBFAC,,,

## 2024-04-11 PROCEDURE — 76816 OB US FOLLOW-UP PER FETUS: CPT | Mod: TC

## 2024-04-11 PROCEDURE — 99213 OFFICE O/P EST LOW 20 MIN: CPT | Mod: PBBFAC,25 | Performed by: STUDENT IN AN ORGANIZED HEALTH CARE EDUCATION/TRAINING PROGRAM

## 2024-04-11 PROCEDURE — 76816 OB US FOLLOW-UP PER FETUS: CPT | Mod: 26,,, | Performed by: RADIOLOGY

## 2024-04-11 PROCEDURE — 0502F SUBSEQUENT PRENATAL CARE: CPT | Mod: ,,, | Performed by: STUDENT IN AN ORGANIZED HEALTH CARE EDUCATION/TRAINING PROGRAM

## 2024-04-11 PROCEDURE — 81003 URINALYSIS AUTO W/O SCOPE: CPT | Mod: PBBFAC | Performed by: STUDENT IN AN ORGANIZED HEALTH CARE EDUCATION/TRAINING PROGRAM

## 2024-04-11 NOTE — PROGRESS NOTES
Return OB Visit    28 y.o. female  at 11w1d   She c/o intermittent cramping.  Denies any vaginal bleeding, leakage of fluid, cramping, contractions, or pressure.   Total weight gain/weight loss in pregnancy: 1.361 kg (3 lb)     Vitals  BP: (!) 138/54  Pulse: 65  Weight: 82.1 kg (181 lb)  +FHTs on ultrasound    Prenatal Labs:  Lab Results   Component Value Date    GROUPTRH A POS 2024    HGB 13.3 2024    HCT 39.8 2024     (H) 2024    HEPBSAG Non-Reactive 2024    [id number] Non-Reactive 2024    LABNGO Negative 2024    LABURIN Skin/Urogenital Mitzy Isolated, no further workup. 2024       A: 11w1d           ICD-10-CM ICD-9-CM    1. Pelvic pain in pregnancy, antepartum, first trimester  O26.891 646.83 CANCELED: US OB <14 Wks, TransAbd, Single Gestation    R10.2 625.9       2. Frequency of urination  R35.0 788.41 POCT URINALYSIS W/O SCOPE      3. Heartburn during pregnancy in first trimester  O26.891 646.83     R12 787.1       4. History of vacuum extraction assisted delivery  Z87.59 V13.29       5. History of miscarriage, currently pregnant  O09.299 V23.2       6. 11 weeks gestation of pregnancy  Z3A.11 V22.2           P: Bleeding precautions discussed.    The following were addressed during this visit:    8-12 Weeks  - Review lab tests   -Benefits of breastfeeding    Orders Placed This Encounter   Procedures    POCT URINALYSIS W/O SCOPE       Questions answered to desired level of satisfaction  Verbalized understanding to all information and instructions provided.  Follow up in about 4 weeks (around 2024) for OBV.    Alyse Boswell, DO OBGYN [facility]

## 2024-04-18 ENCOUNTER — PATIENT MESSAGE (OUTPATIENT)
Dept: OBSTETRICS AND GYNECOLOGY | Facility: CLINIC | Age: 29
End: 2024-04-18
Payer: COMMERCIAL

## 2024-04-22 ENCOUNTER — ROUTINE PRENATAL (OUTPATIENT)
Dept: OBSTETRICS AND GYNECOLOGY | Facility: CLINIC | Age: 29
End: 2024-04-22
Payer: COMMERCIAL

## 2024-04-22 VITALS
WEIGHT: 177.19 LBS | SYSTOLIC BLOOD PRESSURE: 114 MMHG | HEART RATE: 76 BPM | DIASTOLIC BLOOD PRESSURE: 72 MMHG | BODY MASS INDEX: 30.42 KG/M2

## 2024-04-22 DIAGNOSIS — O26.891 HEARTBURN DURING PREGNANCY IN FIRST TRIMESTER: Primary | ICD-10-CM

## 2024-04-22 DIAGNOSIS — Z3A.12 12 WEEKS GESTATION OF PREGNANCY: ICD-10-CM

## 2024-04-22 DIAGNOSIS — R35.0 FREQUENCY OF URINATION: ICD-10-CM

## 2024-04-22 DIAGNOSIS — O09.299 HISTORY OF MISCARRIAGE, CURRENTLY PREGNANT: ICD-10-CM

## 2024-04-22 DIAGNOSIS — R12 HEARTBURN DURING PREGNANCY IN FIRST TRIMESTER: Primary | ICD-10-CM

## 2024-04-22 DIAGNOSIS — Z87.59 HISTORY OF VACUUM EXTRACTION ASSISTED DELIVERY: ICD-10-CM

## 2024-04-22 LAB
BILIRUB SERPL-MCNC: NORMAL MG/DL
BLOOD URINE, POC: NORMAL
COLOR, POC UA: NORMAL
GLUCOSE UR QL STRIP: NORMAL
KETONES UR QL STRIP: NORMAL
LEUKOCYTE ESTERASE URINE, POC: NORMAL
NITRITE, POC UA: NORMAL
PH, POC UA: 8
PROTEIN, POC: NORMAL
SPECIFIC GRAVITY, POC UA: 1
UROBILINOGEN, POC UA: 0.2

## 2024-04-22 PROCEDURE — 99213 OFFICE O/P EST LOW 20 MIN: CPT | Mod: PBBFAC | Performed by: STUDENT IN AN ORGANIZED HEALTH CARE EDUCATION/TRAINING PROGRAM

## 2024-04-22 PROCEDURE — 99999PBSHW POCT URINALYSIS W/O SCOPE: Mod: PBBFAC,,,

## 2024-04-22 PROCEDURE — 0502F SUBSEQUENT PRENATAL CARE: CPT | Mod: ,,, | Performed by: STUDENT IN AN ORGANIZED HEALTH CARE EDUCATION/TRAINING PROGRAM

## 2024-04-22 PROCEDURE — 81003 URINALYSIS AUTO W/O SCOPE: CPT | Mod: PBBFAC | Performed by: STUDENT IN AN ORGANIZED HEALTH CARE EDUCATION/TRAINING PROGRAM

## 2024-04-22 NOTE — PROGRESS NOTES
Return OB Visit    28 y.o. female  at 12w5d   She has no complaints.  Reports her pelvic pain is better.   Denies any vaginal bleeding, leakage of fluid, cramping, contractions, or pressure.   Total weight gain/weight loss in pregnancy: -0.363 kg (-12.8 oz)     Vitals  BP: 114/72  Pulse: 76  Weight: 80.4 kg (177 lb 3.2 oz)  Prenatal  Fetal Heart Rate: 150    Prenatal Labs:  Lab Results   Component Value Date    GROUPTRH A POS 2024    HGB 13.3 2024    HCT 39.8 2024     (H) 2024    HEPBSAG Non-Reactive 2024    [id number] Non-Reactive 2024    LABNGO Negative 2024    LABURIN Skin/Urogenital Mitzy Isolated, no further workup. 2024       A: 12w5d           ICD-10-CM ICD-9-CM    1. Heartburn during pregnancy in first trimester  O26.891 646.83     R12 787.1       2. Frequency of urination  R35.0 788.41 POCT URINALYSIS W/O SCOPE      3. History of vacuum extraction assisted delivery  Z87.59 V13.29       4. History of miscarriage, currently pregnant  O09.299 V23.2       5. 12 weeks gestation of pregnancy  Z3A.12 V22.2           P: Bleeding, daily fetal kick counts, and  labor/labor precautions discussed.    The following were addressed during this visit:    1-8 Weeks  - Importance of Exclusive Breastfeeding for First 6 Months   - Continuation of Breastfeeding of Complimentary after intro of solid foods   - Benefits of Breastfeeding     8-12 Weeks  - Non-pharmacologic Pain Relief Methods for Labor & Birth       Orders Placed This Encounter   Procedures    POCT URINALYSIS W/O SCOPE       Questions answered to desired level of satisfaction  Verbalized understanding to all information and instructions provided.  Follow up in about 4 weeks (around 2024) for ROB. Alyse Boswell, DO OBGYN [facility]

## 2024-04-26 ENCOUNTER — PATIENT MESSAGE (OUTPATIENT)
Dept: OBSTETRICS AND GYNECOLOGY | Facility: CLINIC | Age: 29
End: 2024-04-26
Payer: COMMERCIAL

## 2024-05-20 ENCOUNTER — ROUTINE PRENATAL (OUTPATIENT)
Dept: OBSTETRICS AND GYNECOLOGY | Facility: CLINIC | Age: 29
End: 2024-05-20
Payer: COMMERCIAL

## 2024-05-20 VITALS
WEIGHT: 180 LBS | SYSTOLIC BLOOD PRESSURE: 103 MMHG | HEART RATE: 87 BPM | DIASTOLIC BLOOD PRESSURE: 64 MMHG | BODY MASS INDEX: 30.9 KG/M2

## 2024-05-20 DIAGNOSIS — O26.892 HEARTBURN DURING PREGNANCY IN SECOND TRIMESTER: Primary | ICD-10-CM

## 2024-05-20 DIAGNOSIS — Z3A.16 16 WEEKS GESTATION OF PREGNANCY: ICD-10-CM

## 2024-05-20 DIAGNOSIS — Z36.89 ENCOUNTER FOR FETAL ANATOMIC SURVEY: ICD-10-CM

## 2024-05-20 DIAGNOSIS — Z87.59 HISTORY OF VACUUM EXTRACTION ASSISTED DELIVERY: ICD-10-CM

## 2024-05-20 DIAGNOSIS — R12 HEARTBURN DURING PREGNANCY IN SECOND TRIMESTER: Primary | ICD-10-CM

## 2024-05-20 LAB
BILIRUB SERPL-MCNC: NORMAL MG/DL
BLOOD URINE, POC: NORMAL
COLOR, POC UA: NORMAL
GLUCOSE UR QL STRIP: NORMAL
KETONES UR QL STRIP: NORMAL
LEUKOCYTE ESTERASE URINE, POC: NORMAL
NITRITE, POC UA: NORMAL
PH, POC UA: 7.5
PROTEIN, POC: NORMAL
SPECIFIC GRAVITY, POC UA: 1.01
UROBILINOGEN, POC UA: 0.2

## 2024-05-20 PROCEDURE — 99213 OFFICE O/P EST LOW 20 MIN: CPT | Mod: PBBFAC | Performed by: STUDENT IN AN ORGANIZED HEALTH CARE EDUCATION/TRAINING PROGRAM

## 2024-05-20 PROCEDURE — 99999PBSHW POCT URINALYSIS W/O SCOPE: Mod: PBBFAC,,,

## 2024-05-20 PROCEDURE — 81003 URINALYSIS AUTO W/O SCOPE: CPT | Mod: PBBFAC | Performed by: STUDENT IN AN ORGANIZED HEALTH CARE EDUCATION/TRAINING PROGRAM

## 2024-05-20 PROCEDURE — 0502F SUBSEQUENT PRENATAL CARE: CPT | Mod: ,,, | Performed by: STUDENT IN AN ORGANIZED HEALTH CARE EDUCATION/TRAINING PROGRAM

## 2024-05-20 NOTE — PROGRESS NOTES
Return OB Visit    28 y.o. female  at 16w5d   She has no complaints.   Reports some fetal movement or fluttering. Denies any vaginal bleeding, leakage of fluid, cramping, contractions, or pressure.   Total weight gain/weight loss in pregnancy: 0.907 kg (2 lb)     Vitals  BP: 103/64  Pulse: 87  Weight: 81.6 kg (180 lb)  Prenatal  Fetal Heart Rate: 150  Movement: Present    Prenatal Labs:  Lab Results   Component Value Date    GROUPTRH A POS 2024    HGB 13.3 2024    HCT 39.8 2024     (H) 2024    HEPBSAG Non-Reactive 2024    [id number] Non-Reactive 2024    LABNGO Negative 2024    LABURIN Skin/Urogenital Mitzy Isolated, no further workup. 2024       A: 16w5d           ICD-10-CM ICD-9-CM    1. Heartburn during pregnancy in second trimester  O26.892 646.83     R12 787.1       2. Encounter for fetal anatomic survey  Z36.89 V28.81 POCT URINALYSIS W/O SCOPE      US OB 14+ Wks, TransAbd, Single Gestation      3. History of vacuum extraction assisted delivery  Z87.59 V13.29       4. 16 weeks gestation of pregnancy  Z3A.16 V22.2           P: Bleeding precautions discussed.    The following were addressed during this visit:    13-16 Weeks  - Quad screen   - Anatomy Ultrasound   - Breastfeeding Concerns & Resources   - Importance of Early Skin to Skin Contact       Orders Placed This Encounter   Procedures    US OB 14+ Wks, TransAbd, Single Gestation     Standing Status:   Future     Standing Expiration Date:   2025     Order Specific Question:   May the Radiologist modify the order per protocol to meet the clinical needs of the patient?     Answer:   Yes     Order Specific Question:   Release to patient     Answer:   Immediate    POCT URINALYSIS W/O SCOPE       Questions answered to desired level of satisfaction  Verbalized understanding to all information and instructions provided.  Follow up in about 4 weeks (around 2024) for US with RENO Alberts  Boswell, DO OBGYN [facility]

## 2024-06-14 ENCOUNTER — PATIENT MESSAGE (OUTPATIENT)
Dept: OBSTETRICS AND GYNECOLOGY | Facility: CLINIC | Age: 29
End: 2024-06-14
Payer: COMMERCIAL

## 2024-06-17 RX ORDER — TERCONAZOLE 8 MG/G
1 CREAM VAGINAL NIGHTLY
Qty: 20 G | Refills: 0 | Status: SHIPPED | OUTPATIENT
Start: 2024-06-17 | End: 2024-06-20

## 2024-06-19 ENCOUNTER — HOSPITAL ENCOUNTER (OUTPATIENT)
Dept: RADIOLOGY | Facility: HOSPITAL | Age: 29
Discharge: HOME | End: 2024-06-19
Attending: STUDENT IN AN ORGANIZED HEALTH CARE EDUCATION/TRAINING PROGRAM
Payer: COMMERCIAL

## 2024-06-19 ENCOUNTER — ROUTINE PRENATAL (OUTPATIENT)
Dept: OBSTETRICS AND GYNECOLOGY | Facility: CLINIC | Age: 29
End: 2024-06-19
Payer: COMMERCIAL

## 2024-06-19 VITALS
SYSTOLIC BLOOD PRESSURE: 114 MMHG | WEIGHT: 180 LBS | BODY MASS INDEX: 30.9 KG/M2 | HEART RATE: 86 BPM | DIASTOLIC BLOOD PRESSURE: 66 MMHG

## 2024-06-19 DIAGNOSIS — Z87.59 HISTORY OF VACUUM EXTRACTION ASSISTED DELIVERY: Primary | ICD-10-CM

## 2024-06-19 DIAGNOSIS — Z36.89 ENCOUNTER FOR FETAL ANATOMIC SURVEY: ICD-10-CM

## 2024-06-19 DIAGNOSIS — R35.0 FREQUENCY OF URINATION: ICD-10-CM

## 2024-06-19 DIAGNOSIS — Z3A.21 21 WEEKS GESTATION OF PREGNANCY: ICD-10-CM

## 2024-06-19 PROCEDURE — 99213 OFFICE O/P EST LOW 20 MIN: CPT | Mod: PBBFAC,25 | Performed by: STUDENT IN AN ORGANIZED HEALTH CARE EDUCATION/TRAINING PROGRAM

## 2024-06-19 PROCEDURE — 76805 OB US >/= 14 WKS SNGL FETUS: CPT | Mod: TC

## 2024-06-19 PROCEDURE — 81003 URINALYSIS AUTO W/O SCOPE: CPT | Mod: PBBFAC | Performed by: STUDENT IN AN ORGANIZED HEALTH CARE EDUCATION/TRAINING PROGRAM

## 2024-06-19 PROCEDURE — 76805 OB US >/= 14 WKS SNGL FETUS: CPT | Mod: 26,,, | Performed by: RADIOLOGY

## 2024-06-19 PROCEDURE — 0502F SUBSEQUENT PRENATAL CARE: CPT | Mod: ,,, | Performed by: STUDENT IN AN ORGANIZED HEALTH CARE EDUCATION/TRAINING PROGRAM

## 2024-06-19 PROCEDURE — 99999PBSHW POCT URINALYSIS W/O SCOPE: Mod: PBBFAC,,,

## 2024-06-19 PROCEDURE — 99999 PR PBB SHADOW E&M-EST. PATIENT-LVL III: CPT | Mod: PBBFAC,,, | Performed by: STUDENT IN AN ORGANIZED HEALTH CARE EDUCATION/TRAINING PROGRAM

## 2024-06-19 NOTE — PROGRESS NOTES
Return OB Visit    28 y.o. female  at 21w0d   She has no complaints.  Reports good fetal movement or fluttering. Denies any vaginal bleeding, leakage of fluid, cramping, contractions, or pressure.   Total weight gain/weight loss in pregnancy: 0.907 kg (2 lb)     Vitals  BP: 114/66  Pulse: 86  Weight: 81.6 kg (180 lb)  Prenatal  Fetal Heart Rate: 147  Movement: Present    Prenatal Labs:  Lab Results   Component Value Date    GROUPTRH A POS 2024    HGB 13.3 2024    HCT 39.8 2024     (H) 2024    HEPBSAG Non-Reactive 2024    [id number] Non-Reactive 2024    LABNGO Negative 2024    LABURIN Skin/Urogenital Mitzy Isolated, no further workup. 2024       A: 21w0d           ICD-10-CM ICD-9-CM    1. History of vacuum extraction assisted delivery  Z87.59 V13.29       2. Frequency of urination  R35.0 788.41       3. 21 weeks gestation of pregnancy  Z3A.21 V22.2           P: Bleeding, daily fetal kick counts, and  labor/labor precautions discussed.    No pregnancy checklist tasks were completed during this visit, and no tasks are pending completion.      No orders of the defined types were placed in this encounter.      Questions answered to desired level of satisfaction  Verbalized understanding to all information and instructions provided.  Follow up in about 4 weeks (around 2024) for OBV.    Alyse Boswell, DO OBGYN [facility]

## 2024-07-17 ENCOUNTER — ROUTINE PRENATAL (OUTPATIENT)
Dept: OBSTETRICS AND GYNECOLOGY | Facility: CLINIC | Age: 29
End: 2024-07-17
Payer: COMMERCIAL

## 2024-07-17 VITALS
BODY MASS INDEX: 31.07 KG/M2 | DIASTOLIC BLOOD PRESSURE: 62 MMHG | HEART RATE: 75 BPM | WEIGHT: 181 LBS | SYSTOLIC BLOOD PRESSURE: 103 MMHG

## 2024-07-17 DIAGNOSIS — R35.0 FREQUENCY OF URINATION: ICD-10-CM

## 2024-07-17 DIAGNOSIS — Z3A.25 25 WEEKS GESTATION OF PREGNANCY: ICD-10-CM

## 2024-07-17 DIAGNOSIS — R12 HEARTBURN DURING PREGNANCY IN SECOND TRIMESTER: Primary | ICD-10-CM

## 2024-07-17 DIAGNOSIS — O26.892 HEARTBURN DURING PREGNANCY IN SECOND TRIMESTER: Primary | ICD-10-CM

## 2024-07-17 DIAGNOSIS — Z87.59 HISTORY OF VACUUM EXTRACTION ASSISTED DELIVERY: ICD-10-CM

## 2024-07-17 LAB
BILIRUB SERPL-MCNC: NORMAL MG/DL
BLOOD URINE, POC: NORMAL
CLARITY, POC UA: NORMAL
COLOR, POC UA: NORMAL
GLUCOSE UR QL STRIP: NORMAL
KETONES UR QL STRIP: NORMAL
LEUKOCYTE ESTERASE URINE, POC: NORMAL
NITRITE, POC UA: NORMAL
PH, POC UA: 7
PROTEIN, POC: NORMAL
SPECIFIC GRAVITY, POC UA: 1.01
UROBILINOGEN, POC UA: 0.2

## 2024-07-17 PROCEDURE — 99999PBSHW POCT URINALYSIS W/O SCOPE: Mod: PBBFAC,,,

## 2024-07-17 PROCEDURE — 81003 URINALYSIS AUTO W/O SCOPE: CPT | Mod: PBBFAC | Performed by: STUDENT IN AN ORGANIZED HEALTH CARE EDUCATION/TRAINING PROGRAM

## 2024-07-17 PROCEDURE — 99999 PR PBB SHADOW E&M-EST. PATIENT-LVL III: CPT | Mod: PBBFAC,,, | Performed by: STUDENT IN AN ORGANIZED HEALTH CARE EDUCATION/TRAINING PROGRAM

## 2024-07-17 PROCEDURE — 0502F SUBSEQUENT PRENATAL CARE: CPT | Mod: ,,, | Performed by: STUDENT IN AN ORGANIZED HEALTH CARE EDUCATION/TRAINING PROGRAM

## 2024-07-17 PROCEDURE — 99213 OFFICE O/P EST LOW 20 MIN: CPT | Mod: PBBFAC | Performed by: STUDENT IN AN ORGANIZED HEALTH CARE EDUCATION/TRAINING PROGRAM

## 2024-07-17 NOTE — PROGRESS NOTES
Return OB Visit    28 y.o. female  at 25w0d   She has no complaints.  Reports good fetal movement or fluttering. Denies any vaginal bleeding, leakage of fluid, cramping, contractions, or pressure.   Total weight gain/weight loss in pregnancy: 1.361 kg (3 lb)     Vitals  BP: 103/62  Pulse: 75  Weight: 82.1 kg (181 lb)  Prenatal  Fundal Height (cm): 25 cm  Fetal Heart Rate: 140s  Movement: Present    Prenatal Labs:  Lab Results   Component Value Date    GROUPTRH A POS 2024    HGB 13.3 2024    HCT 39.8 2024     (H) 2024    HEPBSAG Non-Reactive 2024    [id number] Non-Reactive 2024    LABNGO Negative 2024    LABURIN Skin/Urogenital Mitzy Isolated, no further workup. 2024       A: 25w0d           ICD-10-CM ICD-9-CM    1. History of vacuum extraction assisted delivery  Z87.59 V13.29       2. Frequency of urination  R35.0 788.41 POCT URINALYSIS W/O SCOPE      3. Heartburn during pregnancy in second trimester  O26.892 646.83     R12 787.1       4. 25 weeks gestation of pregnancy  Z3A.25 V22.2 Glucose, 1Hr Post Prandial      Treponema Pallidum (Syphillis) Antibody      CBC Auto Differential          P: Bleeding, daily fetal kick counts, and  labor/labor precautions discussed.    No pregnancy checklist tasks were completed during this visit, and no tasks are pending completion.      Orders Placed This Encounter   Procedures    Glucose, 1Hr Post Prandial     Standing Status:   Future     Standing Expiration Date:   2025    Treponema Pallidum (Syphillis) Antibody     Standing Status:   Future     Standing Expiration Date:   10/13/2025    CBC Auto Differential     Standing Status:   Future     Standing Expiration Date:   10/13/2025    POCT URINALYSIS W/O SCOPE       Questions answered to desired level of satisfaction  Verbalized understanding to all information and instructions provided.  Follow up in about 3 weeks (around 2024) for RENO Chicas  Boswell, DO OBGYN [facility]

## 2024-08-07 ENCOUNTER — ROUTINE PRENATAL (OUTPATIENT)
Dept: OBSTETRICS AND GYNECOLOGY | Facility: CLINIC | Age: 29
End: 2024-08-07
Payer: COMMERCIAL

## 2024-08-07 VITALS
WEIGHT: 182 LBS | HEART RATE: 84 BPM | BODY MASS INDEX: 31.24 KG/M2 | SYSTOLIC BLOOD PRESSURE: 124 MMHG | DIASTOLIC BLOOD PRESSURE: 65 MMHG

## 2024-08-07 DIAGNOSIS — R12 HEARTBURN DURING PREGNANCY IN THIRD TRIMESTER: Primary | ICD-10-CM

## 2024-08-07 DIAGNOSIS — Z87.59 HISTORY OF VACUUM EXTRACTION ASSISTED DELIVERY: ICD-10-CM

## 2024-08-07 DIAGNOSIS — Z3A.28 28 WEEKS GESTATION OF PREGNANCY: ICD-10-CM

## 2024-08-07 DIAGNOSIS — O26.893 HEARTBURN DURING PREGNANCY IN THIRD TRIMESTER: Primary | ICD-10-CM

## 2024-08-07 LAB
BILIRUB SERPL-MCNC: NORMAL MG/DL
BLOOD URINE, POC: NORMAL
CLARITY, POC UA: NORMAL
COLOR, POC UA: NORMAL
GLUCOSE UR QL STRIP: NORMAL
KETONES UR QL STRIP: NORMAL
LEUKOCYTE ESTERASE URINE, POC: NORMAL
NITRITE, POC UA: NORMAL
PH, POC UA: 6.5
PROTEIN, POC: NORMAL
SPECIFIC GRAVITY, POC UA: 1.03
UROBILINOGEN, POC UA: 0.2

## 2024-08-07 PROCEDURE — 99999 PR PBB SHADOW E&M-EST. PATIENT-LVL III: CPT | Mod: PBBFAC,,, | Performed by: STUDENT IN AN ORGANIZED HEALTH CARE EDUCATION/TRAINING PROGRAM

## 2024-08-07 PROCEDURE — 99213 OFFICE O/P EST LOW 20 MIN: CPT | Mod: PBBFAC | Performed by: STUDENT IN AN ORGANIZED HEALTH CARE EDUCATION/TRAINING PROGRAM

## 2024-08-07 PROCEDURE — 81003 URINALYSIS AUTO W/O SCOPE: CPT | Mod: PBBFAC | Performed by: STUDENT IN AN ORGANIZED HEALTH CARE EDUCATION/TRAINING PROGRAM

## 2024-08-07 PROCEDURE — 99999PBSHW POCT URINALYSIS W/O SCOPE: Mod: PBBFAC,,,

## 2024-08-07 PROCEDURE — 0502F SUBSEQUENT PRENATAL CARE: CPT | Mod: ,,, | Performed by: STUDENT IN AN ORGANIZED HEALTH CARE EDUCATION/TRAINING PROGRAM

## 2024-08-21 ENCOUNTER — ROUTINE PRENATAL (OUTPATIENT)
Dept: OBSTETRICS AND GYNECOLOGY | Facility: CLINIC | Age: 29
End: 2024-08-21
Payer: COMMERCIAL

## 2024-08-21 VITALS
BODY MASS INDEX: 31.69 KG/M2 | SYSTOLIC BLOOD PRESSURE: 115 MMHG | HEART RATE: 86 BPM | WEIGHT: 184.63 LBS | DIASTOLIC BLOOD PRESSURE: 64 MMHG

## 2024-08-21 DIAGNOSIS — R12 HEARTBURN DURING PREGNANCY IN THIRD TRIMESTER: Primary | ICD-10-CM

## 2024-08-21 DIAGNOSIS — O26.893 HEARTBURN DURING PREGNANCY IN THIRD TRIMESTER: Primary | ICD-10-CM

## 2024-08-21 DIAGNOSIS — R35.0 FREQUENCY OF URINATION: ICD-10-CM

## 2024-08-21 DIAGNOSIS — Z3A.30 30 WEEKS GESTATION OF PREGNANCY: ICD-10-CM

## 2024-08-21 DIAGNOSIS — Z87.59 HISTORY OF VACUUM EXTRACTION ASSISTED DELIVERY: ICD-10-CM

## 2024-08-21 LAB
BILIRUB SERPL-MCNC: NORMAL MG/DL
BLOOD URINE, POC: NORMAL
CLARITY, POC UA: CLEAR
COLOR, POC UA: YELLOW
GLUCOSE UR QL STRIP: NORMAL
KETONES UR QL STRIP: NORMAL
LEUKOCYTE ESTERASE URINE, POC: NORMAL
NITRITE, POC UA: NORMAL
PH, POC UA: 7
PROTEIN, POC: NORMAL
SPECIFIC GRAVITY, POC UA: 1.01
UROBILINOGEN, POC UA: 0.2

## 2024-08-21 PROCEDURE — 99213 OFFICE O/P EST LOW 20 MIN: CPT | Mod: PBBFAC | Performed by: STUDENT IN AN ORGANIZED HEALTH CARE EDUCATION/TRAINING PROGRAM

## 2024-08-21 PROCEDURE — 0502F SUBSEQUENT PRENATAL CARE: CPT | Mod: ,,, | Performed by: STUDENT IN AN ORGANIZED HEALTH CARE EDUCATION/TRAINING PROGRAM

## 2024-08-21 PROCEDURE — 99999PBSHW POCT URINALYSIS W/O SCOPE: Mod: PBBFAC,,,

## 2024-08-21 PROCEDURE — 81003 URINALYSIS AUTO W/O SCOPE: CPT | Mod: PBBFAC | Performed by: STUDENT IN AN ORGANIZED HEALTH CARE EDUCATION/TRAINING PROGRAM

## 2024-08-21 PROCEDURE — 99999 PR PBB SHADOW E&M-EST. PATIENT-LVL III: CPT | Mod: PBBFAC,,, | Performed by: STUDENT IN AN ORGANIZED HEALTH CARE EDUCATION/TRAINING PROGRAM

## 2024-08-21 NOTE — PROGRESS NOTES
Return OB Visit    28 y.o. female  at 30w0d   She has no complaints.  Reports good fetal movement or fluttering. Denies any vaginal bleeding, leakage of fluid, cramping, contractions, or pressure.   Total weight gain/weight loss in pregnancy: 2.994 kg (6 lb 9.6 oz)     Vitals  BP: 115/64  Pulse: 86  Weight: 83.7 kg (184 lb 9.6 oz)  Prenatal  Fundal Height (cm): 30 cm  Fetal Heart Rate: 140s  Movement: Present    Prenatal Labs:  Lab Results   Component Value Date    GROUPTRH A POS 2024    HGB 11.9 (L) 2024    HCT 36.0 (L) 2024     2024    HEPBSAG Non-Reactive 2024    [id number] Non-Reactive 2024    LABNGO Negative 2024    LABURIN Skin/Urogenital Mitzy Isolated, no further workup. 2024       A: 30w0d           ICD-10-CM ICD-9-CM    1. Heartburn during pregnancy in third trimester  O26.893 646.83     R12 787.1       2. Frequency of urination  R35.0 788.41 POCT URINALYSIS W/O SCOPE      3. History of vacuum extraction assisted delivery  Z87.59 V13.29       4. 30 weeks gestation of pregnancy  Z3A.30 V22.2           P: Bleeding, daily fetal kick counts, and  labor/labor precautions discussed.    No pregnancy checklist tasks were completed during this visit, and no tasks are pending completion.      Orders Placed This Encounter   Procedures    POCT URINALYSIS W/O SCOPE       Questions answered to desired level of satisfaction  Verbalized understanding to all information and instructions provided.  Follow up in about 2 weeks (around 2024) for ROB. Alyse Boswell, DO [facility]OO[facility] OBG[facility]

## 2024-09-04 ENCOUNTER — ROUTINE PRENATAL (OUTPATIENT)
Dept: OBSTETRICS AND GYNECOLOGY | Facility: CLINIC | Age: 29
End: 2024-09-04
Payer: COMMERCIAL

## 2024-09-04 VITALS
DIASTOLIC BLOOD PRESSURE: 59 MMHG | SYSTOLIC BLOOD PRESSURE: 109 MMHG | BODY MASS INDEX: 31.62 KG/M2 | HEART RATE: 85 BPM | WEIGHT: 184.19 LBS

## 2024-09-04 DIAGNOSIS — O26.893 HEARTBURN DURING PREGNANCY IN THIRD TRIMESTER: Primary | ICD-10-CM

## 2024-09-04 DIAGNOSIS — R12 HEARTBURN DURING PREGNANCY IN THIRD TRIMESTER: Primary | ICD-10-CM

## 2024-09-04 DIAGNOSIS — Z23 NEED FOR TDAP VACCINATION: ICD-10-CM

## 2024-09-04 DIAGNOSIS — Z29.11 NEED FOR RSV IMMUNIZATION: ICD-10-CM

## 2024-09-04 DIAGNOSIS — Z3A.32 32 WEEKS GESTATION OF PREGNANCY: ICD-10-CM

## 2024-09-04 DIAGNOSIS — N89.8 VAGINA ITCHING: ICD-10-CM

## 2024-09-04 LAB
BACTERIA #/AREA URNS HPF: ABNORMAL /HPF
BILIRUB UR QL STRIP: NEGATIVE
CANDIDA SPECIES: POSITIVE
CLARITY UR: ABNORMAL
COLOR UR: YELLOW
GARDNERELLA: POSITIVE
GLUCOSE UR STRIP-MCNC: NORMAL MG/DL
KETONES UR STRIP-SCNC: 10 MG/DL
LEUKOCYTE ESTERASE UR QL STRIP: ABNORMAL
MUCOUS, UA: ABNORMAL /LPF
NITRITE UR QL STRIP: NEGATIVE
PH UR STRIP: 6.5 PH UNITS
PROT UR QL STRIP: 30
RBC # UR STRIP: NEGATIVE /UL
RBC #/AREA URNS HPF: 11 /HPF
SP GR UR STRIP: 1.02
SQUAMOUS #/AREA URNS LPF: ABNORMAL /HPF
TRICHOMONAS: NEGATIVE
UROBILINOGEN UR STRIP-ACNC: NORMAL MG/DL
WBC #/AREA URNS HPF: 12 /HPF
YEAST #/AREA URNS HPF: ABNORMAL /HPF

## 2024-09-04 PROCEDURE — 99213 OFFICE O/P EST LOW 20 MIN: CPT | Mod: PBBFAC,25 | Performed by: STUDENT IN AN ORGANIZED HEALTH CARE EDUCATION/TRAINING PROGRAM

## 2024-09-04 PROCEDURE — 87480 CANDIDA DNA DIR PROBE: CPT | Mod: ,,, | Performed by: CLINICAL MEDICAL LABORATORY

## 2024-09-04 PROCEDURE — 90472 IMMUNIZATION ADMIN EACH ADD: CPT | Mod: PBBFAC | Performed by: STUDENT IN AN ORGANIZED HEALTH CARE EDUCATION/TRAINING PROGRAM

## 2024-09-04 PROCEDURE — 99999PBSHW PR PBB SHADOW TECHNICAL ONLY FILED TO HB: Mod: PBBFAC,,,

## 2024-09-04 PROCEDURE — 99999 PR PBB SHADOW E&M-EST. PATIENT-LVL III: CPT | Mod: PBBFAC,,, | Performed by: STUDENT IN AN ORGANIZED HEALTH CARE EDUCATION/TRAINING PROGRAM

## 2024-09-04 PROCEDURE — 0502F SUBSEQUENT PRENATAL CARE: CPT | Mod: ,,, | Performed by: STUDENT IN AN ORGANIZED HEALTH CARE EDUCATION/TRAINING PROGRAM

## 2024-09-04 PROCEDURE — 90678 RSV VACC PREF BIVALENT IM: CPT | Mod: PBBFAC | Performed by: STUDENT IN AN ORGANIZED HEALTH CARE EDUCATION/TRAINING PROGRAM

## 2024-09-04 PROCEDURE — 90715 TDAP VACCINE 7 YRS/> IM: CPT | Mod: PBBFAC | Performed by: STUDENT IN AN ORGANIZED HEALTH CARE EDUCATION/TRAINING PROGRAM

## 2024-09-04 PROCEDURE — 87510 GARDNER VAG DNA DIR PROBE: CPT | Mod: ,,, | Performed by: CLINICAL MEDICAL LABORATORY

## 2024-09-04 PROCEDURE — 90471 IMMUNIZATION ADMIN: CPT | Mod: PBBFAC | Performed by: STUDENT IN AN ORGANIZED HEALTH CARE EDUCATION/TRAINING PROGRAM

## 2024-09-04 PROCEDURE — 87660 TRICHOMONAS VAGIN DIR PROBE: CPT | Mod: ,,, | Performed by: CLINICAL MEDICAL LABORATORY

## 2024-09-04 RX ORDER — TERCONAZOLE 4 MG/G
1 CREAM VAGINAL NIGHTLY
Qty: 45 G | Refills: 0 | Status: SHIPPED | OUTPATIENT
Start: 2024-09-04 | End: 2024-09-11

## 2024-09-04 RX ORDER — METRONIDAZOLE 500 MG/1
500 TABLET ORAL EVERY 12 HOURS
Qty: 14 TABLET | Refills: 0 | Status: SHIPPED | OUTPATIENT
Start: 2024-09-04 | End: 2024-09-11

## 2024-09-04 RX ORDER — FAMOTIDINE 20 MG/1
20 TABLET, FILM COATED ORAL 2 TIMES DAILY
COMMUNITY

## 2024-09-04 RX ADMIN — TETANUS TOXOID, REDUCED DIPHTHERIA TOXOID AND ACELLULAR PERTUSSIS VACCINE, ADSORBED 0.5 ML: 5; 2.5; 8; 8; 2.5 SUSPENSION INTRAMUSCULAR at 09:09

## 2024-09-04 RX ADMIN — Medication 120 MCG: at 09:09

## 2024-09-04 NOTE — PROGRESS NOTES
Return OB Visit    28 y.o. female  at 32w0d   She c/o vaginal irritation, redness and itching.  Reports good fetal movement or fluttering. Denies any vaginal bleeding, leakage of fluid, cramping, contractions, or pressure.   Total weight gain/weight loss in pregnancy: 2.812 kg (6 lb 3.2 oz)     Vitals  BP: (!) 109/59  Pulse: 85  Weight: 83.6 kg (184 lb 3.2 oz)  Prenatal  Fundal Height (cm): 32 cm  Fetal Heart Rate: 130s  Movement: Present    Prenatal Labs:  Lab Results   Component Value Date    GROUPTRH A POS 2024    HGB 11.9 (L) 2024    HCT 36.0 (L) 2024     2024    HEPBSAG Non-Reactive 2024    [id number] Non-Reactive 2024    LABNGO Negative 2024    LABURIN Skin/Urogenital Mitzy Isolated, no further workup. 2024       A: 32w0d           ICD-10-CM ICD-9-CM    1. Heartburn during pregnancy in third trimester  O26.893 646.83     R12 787.1       2. Vagina itching  N89.8 698.1 Bacterial Vaginosis      3. 32 weeks gestation of pregnancy  Z3A.32 V22.2 Urinalysis, Reflex to Urine Culture      Urinalysis, Microscopic      Urine culture      4. Need for Tdap vaccination  Z23 V06.1 Tdap (BOOSTRIX) vaccine injection 0.5 mL      5. Need for RSV immunization  Z29.11 V04.82 RSV, preF A and preF B(PF) (Abrysvo) vaccine 120 mcg          No pregnancy checklist tasks were completed during this visit, and no tasks are pending completion.    -Benefits of breastfeeding   -Rooming In and Skin to Skin    P: Bleeding, daily fetal kick counts, and  labor/labor precautions discussed.    Questions answered to desired level of satisfaction  Verbalized understanding to all information and instructions provided.  Follow up in about 2 weeks (around 2024) for RENO

## 2024-09-07 LAB
UA COMPLETE W REFLEX CULTURE PNL UR: ABNORMAL
UA COMPLETE W REFLEX CULTURE PNL UR: ABNORMAL

## 2024-09-18 ENCOUNTER — ROUTINE PRENATAL (OUTPATIENT)
Dept: OBSTETRICS AND GYNECOLOGY | Facility: CLINIC | Age: 29
End: 2024-09-18
Payer: COMMERCIAL

## 2024-09-18 VITALS
HEART RATE: 87 BPM | BODY MASS INDEX: 31.72 KG/M2 | WEIGHT: 184.81 LBS | SYSTOLIC BLOOD PRESSURE: 116 MMHG | DIASTOLIC BLOOD PRESSURE: 63 MMHG

## 2024-09-18 DIAGNOSIS — Z3A.34 34 WEEKS GESTATION OF PREGNANCY: ICD-10-CM

## 2024-09-18 DIAGNOSIS — R82.998 LEUKOCYTES IN URINE: ICD-10-CM

## 2024-09-18 DIAGNOSIS — R12 HEARTBURN DURING PREGNANCY IN THIRD TRIMESTER: Primary | ICD-10-CM

## 2024-09-18 DIAGNOSIS — R35.0 FREQUENCY OF URINATION: ICD-10-CM

## 2024-09-18 DIAGNOSIS — O26.893 HEARTBURN DURING PREGNANCY IN THIRD TRIMESTER: Primary | ICD-10-CM

## 2024-09-18 LAB
BILIRUB SERPL-MCNC: NORMAL MG/DL
BLOOD URINE, POC: NORMAL
CLARITY, UA: CLEAR
COLOR, UA: YELLOW
GLUCOSE UR QL STRIP: NORMAL
KETONES UR QL STRIP: NORMAL
LEUKOCYTE ESTERASE URINE, POC: NORMAL
NITRITE, POC UA: NORMAL
PH, POC UA: 7
PROTEIN, POC: NORMAL
SPECIFIC GRAVITY, POC UA: 1.01
UROBILINOGEN, POC UA: 0.2

## 2024-09-18 PROCEDURE — 87086 URINE CULTURE/COLONY COUNT: CPT | Mod: ,,, | Performed by: CLINICAL MEDICAL LABORATORY

## 2024-09-18 PROCEDURE — 87186 SC STD MICRODIL/AGAR DIL: CPT | Mod: ,,, | Performed by: CLINICAL MEDICAL LABORATORY

## 2024-09-18 PROCEDURE — 99999PBSHW POCT URINALYSIS W/O SCOPE: Mod: PBBFAC,,,

## 2024-09-18 PROCEDURE — 87077 CULTURE AEROBIC IDENTIFY: CPT | Mod: ,,, | Performed by: CLINICAL MEDICAL LABORATORY

## 2024-09-18 PROCEDURE — 81003 URINALYSIS AUTO W/O SCOPE: CPT | Mod: PBBFAC | Performed by: STUDENT IN AN ORGANIZED HEALTH CARE EDUCATION/TRAINING PROGRAM

## 2024-09-18 PROCEDURE — 99213 OFFICE O/P EST LOW 20 MIN: CPT | Mod: PBBFAC | Performed by: STUDENT IN AN ORGANIZED HEALTH CARE EDUCATION/TRAINING PROGRAM

## 2024-09-18 PROCEDURE — 99999 PR PBB SHADOW E&M-EST. PATIENT-LVL III: CPT | Mod: PBBFAC,,, | Performed by: STUDENT IN AN ORGANIZED HEALTH CARE EDUCATION/TRAINING PROGRAM

## 2024-09-18 PROCEDURE — 0502F SUBSEQUENT PRENATAL CARE: CPT | Mod: ,,, | Performed by: STUDENT IN AN ORGANIZED HEALTH CARE EDUCATION/TRAINING PROGRAM

## 2024-09-18 RX ORDER — TERCONAZOLE 8 MG/G
1 CREAM VAGINAL NIGHTLY
Qty: 20 G | Refills: 0 | Status: SHIPPED | OUTPATIENT
Start: 2024-09-18 | End: 2024-09-21

## 2024-09-18 NOTE — PROGRESS NOTES
Return OB Visit    29 y.o. female  at 34w0d   She has no complaints.  Reports good fetal movement or fluttering. Denies any vaginal bleeding, leakage of fluid, cramping, contractions, or pressure.   Total weight gain/weight loss in pregnancy: 3.084 kg (6 lb 12.8 oz)     Vitals  BP: 116/63  Pulse: 87  Weight: 83.8 kg (184 lb 12.8 oz)  Prenatal  Fundal Height (cm): 33 cm  Fetal Heart Rate: 160  Movement: Present    Prenatal Labs:  Lab Results   Component Value Date    GROUPTRH A POS 2024    HGB 11.9 (L) 2024    HCT 36.0 (L) 2024     2024    HEPBSAG Non-Reactive 2024    [id number] Non-Reactive 2024    LABNGO Negative 2024    LABURIN 25,000 Staphylococcus aureus (A) 2024    LABURIN 8,000 Yeast (A) 2024       A: 34w0d           ICD-10-CM ICD-9-CM    1. Heartburn during pregnancy in third trimester  O26.893 646.83     R12 787.1       2. Leukocytes in urine  R82.998 791.7 Urine culture      3. 34 weeks gestation of pregnancy  Z3A.34 V22.2 US OB Follow-up Ea Gestation Transabd      4. Frequency of urination  R35.0 788.41 POCT URINALYSIS W/O SCOPE          P: Bleeding, daily fetal kick counts, and  labor/labor precautions discussed.    The following were addressed during this visit:    33-36 Weeks  - Breastfeeding   - Fetal Kick Counts/PIH/PTL precautions       Orders Placed This Encounter   Procedures    Urine culture    US OB Follow-up Ea Gestation Transabd     Standing Status:   Future     Standing Expiration Date:   2025     Order Specific Question:   May the Radiologist modify the order per protocol to meet the clinical needs of the patient?     Answer:   Yes     Order Specific Question:   Release to patient     Answer:   Immediate    POCT URINALYSIS W/O SCOPE       Questions answered to desired level of satisfaction  Verbalized understanding to all information and instructions provided.  Follow up in about 2 weeks (around 10/2/2024) for  US SRINATH.    Alyse Boswell, DO F[facility]OO[facility] OBGYN [facility]

## 2024-09-20 LAB
UA COMPLETE W REFLEX CULTURE PNL UR: ABNORMAL
UA COMPLETE W REFLEX CULTURE PNL UR: ABNORMAL

## 2024-09-23 ENCOUNTER — PATIENT MESSAGE (OUTPATIENT)
Dept: OBSTETRICS AND GYNECOLOGY | Facility: CLINIC | Age: 29
End: 2024-09-23
Payer: COMMERCIAL

## 2024-09-23 RX ORDER — CLINDAMYCIN HYDROCHLORIDE 150 MG/1
450 CAPSULE ORAL 3 TIMES DAILY
Qty: 63 CAPSULE | Refills: 0 | Status: SHIPPED | OUTPATIENT
Start: 2024-09-23 | End: 2024-09-30

## 2024-10-02 ENCOUNTER — HOSPITAL ENCOUNTER (OUTPATIENT)
Dept: RADIOLOGY | Facility: HOSPITAL | Age: 29
Discharge: HOME | End: 2024-10-02
Attending: STUDENT IN AN ORGANIZED HEALTH CARE EDUCATION/TRAINING PROGRAM
Payer: COMMERCIAL

## 2024-10-02 ENCOUNTER — ROUTINE PRENATAL (OUTPATIENT)
Dept: OBSTETRICS AND GYNECOLOGY | Facility: CLINIC | Age: 29
End: 2024-10-02
Payer: COMMERCIAL

## 2024-10-02 VITALS
BODY MASS INDEX: 31.69 KG/M2 | HEART RATE: 77 BPM | SYSTOLIC BLOOD PRESSURE: 109 MMHG | DIASTOLIC BLOOD PRESSURE: 73 MMHG | WEIGHT: 184.63 LBS

## 2024-10-02 DIAGNOSIS — Z11.3 SCREEN FOR STD (SEXUALLY TRANSMITTED DISEASE): ICD-10-CM

## 2024-10-02 DIAGNOSIS — R35.0 FREQUENCY OF URINATION: ICD-10-CM

## 2024-10-02 DIAGNOSIS — R12 HEARTBURN DURING PREGNANCY IN THIRD TRIMESTER: Primary | ICD-10-CM

## 2024-10-02 DIAGNOSIS — Z3A.34 34 WEEKS GESTATION OF PREGNANCY: ICD-10-CM

## 2024-10-02 DIAGNOSIS — O26.893 HEARTBURN DURING PREGNANCY IN THIRD TRIMESTER: Primary | ICD-10-CM

## 2024-10-02 DIAGNOSIS — Z3A.36 36 WEEKS GESTATION OF PREGNANCY: ICD-10-CM

## 2024-10-02 LAB
BILIRUB SERPL-MCNC: NORMAL MG/DL
BLOOD URINE, POC: NORMAL
CLARITY, UA: NORMAL
COLOR, UA: NORMAL
GLUCOSE UR QL STRIP: NORMAL
KETONES UR QL STRIP: NORMAL
LEUKOCYTE ESTERASE URINE, POC: NORMAL
NITRITE, POC UA: NORMAL
PH, POC UA: 7.5
PROTEIN, POC: NORMAL
SPECIFIC GRAVITY, POC UA: 1
UROBILINOGEN, POC UA: 0.2

## 2024-10-02 PROCEDURE — 0502F SUBSEQUENT PRENATAL CARE: CPT | Mod: ,,, | Performed by: STUDENT IN AN ORGANIZED HEALTH CARE EDUCATION/TRAINING PROGRAM

## 2024-10-02 PROCEDURE — 99999 PR PBB SHADOW E&M-EST. PATIENT-LVL III: CPT | Mod: PBBFAC,,, | Performed by: STUDENT IN AN ORGANIZED HEALTH CARE EDUCATION/TRAINING PROGRAM

## 2024-10-02 PROCEDURE — 76816 OB US FOLLOW-UP PER FETUS: CPT | Mod: TC

## 2024-10-02 PROCEDURE — 76816 OB US FOLLOW-UP PER FETUS: CPT | Mod: 26,,, | Performed by: RADIOLOGY

## 2024-10-02 PROCEDURE — 99999PBSHW POCT URINALYSIS W/O SCOPE: Mod: PBBFAC,,,

## 2024-10-02 PROCEDURE — 99213 OFFICE O/P EST LOW 20 MIN: CPT | Mod: PBBFAC,25 | Performed by: STUDENT IN AN ORGANIZED HEALTH CARE EDUCATION/TRAINING PROGRAM

## 2024-10-02 PROCEDURE — 87491 CHLMYD TRACH DNA AMP PROBE: CPT | Mod: ,,, | Performed by: CLINICAL MEDICAL LABORATORY

## 2024-10-02 PROCEDURE — 87591 N.GONORRHOEAE DNA AMP PROB: CPT | Mod: ,,, | Performed by: CLINICAL MEDICAL LABORATORY

## 2024-10-02 PROCEDURE — 81003 URINALYSIS AUTO W/O SCOPE: CPT | Mod: PBBFAC | Performed by: STUDENT IN AN ORGANIZED HEALTH CARE EDUCATION/TRAINING PROGRAM

## 2024-10-02 NOTE — PROGRESS NOTES
Return OB Visit    29 y.o. female  at 36w0d   She has no complaints.   Reports good fetal movement or fluttering. Denies any vaginal bleeding, leakage of fluid, cramping, contractions, or pressure.   Total weight gain/weight loss in pregnancy: 2.994 kg (6 lb 9.6 oz)     Vitals  BP: 109/73  Pulse: 77  Weight: 83.7 kg (184 lb 9.6 oz)  Prenatal  Fetal Heart Rate: 126  Movement: Present  Cervical Exam  Dilation: Fingertip  Effacement (%): 0  Station: Ballotable  Station (Labor Curve): 9 cm  Dilation/Effacement/Station  Dilation: Fingertip  Effacement (%): 0  Station: Ballotable    Prenatal Labs:  Lab Results   Component Value Date    GROUPTRH A POS 2024    HGB 11.9 (L) 2024    HCT 36.0 (L) 2024     2024    HEPBSAG Non-Reactive 2024    [id number] Non-Reactive 2024    LABNGO Negative 2024    LABURIN 25,000 Staphylococcus aureus (A) 2024    LABURIN 8,000 Yeast (A) 2024       A: 36w0d           ICD-10-CM ICD-9-CM    1. Heartburn during pregnancy in third trimester  O26.893 646.83     R12 787.1       2. Frequency of urination  R35.0 788.41 POCT URINALYSIS W/O SCOPE      3. 36 weeks gestation of pregnancy  Z3A.36 V22.2 Culture, Group B Strep      4. Screen for STD (sexually transmitted disease)  Z11.3 V74.5 Chlamydia/GC, PCR          P: Bleeding, daily fetal kick counts, and  labor/labor precautions discussed.    The following were addressed during this visit:    33-36 Weeks  - Childbirth Education/Hospital Tours   - Group B Strep Test/HIV/RPR       Orders Placed This Encounter   Procedures    Chlamydia/GC, PCR     Order Specific Question:   Release to patient     Answer:   Immediate    Culture, Group B Strep     Order Specific Question:   Send normal result to authorizing provider's In Basket if patient is active on MyChart:     Answer:   Yes    POCT URINALYSIS W/O SCOPE       Questions answered to desired level of satisfaction  Verbalized  understanding to all information and instructions provided.  Follow up in about 1 week (around 10/9/2024) for OBV.    Alyse Boswell, DO [facility] OBG[facility]

## 2024-10-03 LAB
CHLAMYDIA BY PCR: NEGATIVE
N. GONORRHOEAE (GC) BY PCR: NEGATIVE

## 2024-10-04 LAB — CULTURE, GROUP B STREP: NORMAL

## 2024-10-09 ENCOUNTER — ROUTINE PRENATAL (OUTPATIENT)
Dept: OBSTETRICS AND GYNECOLOGY | Facility: CLINIC | Age: 29
End: 2024-10-09
Payer: COMMERCIAL

## 2024-10-09 VITALS
HEART RATE: 67 BPM | SYSTOLIC BLOOD PRESSURE: 116 MMHG | BODY MASS INDEX: 31.93 KG/M2 | WEIGHT: 186 LBS | DIASTOLIC BLOOD PRESSURE: 68 MMHG

## 2024-10-09 DIAGNOSIS — Z3A.37 37 WEEKS GESTATION OF PREGNANCY: ICD-10-CM

## 2024-10-09 DIAGNOSIS — O26.893 HEARTBURN DURING PREGNANCY IN THIRD TRIMESTER: Primary | ICD-10-CM

## 2024-10-09 DIAGNOSIS — R12 HEARTBURN DURING PREGNANCY IN THIRD TRIMESTER: Primary | ICD-10-CM

## 2024-10-09 DIAGNOSIS — O23.43 URINARY TRACT INFECTION IN MOTHER DURING THIRD TRIMESTER OF PREGNANCY: ICD-10-CM

## 2024-10-09 DIAGNOSIS — R35.0 FREQUENCY OF URINATION: ICD-10-CM

## 2024-10-09 DIAGNOSIS — Z34.90 ENCOUNTER FOR ELECTIVE INDUCTION OF LABOR: ICD-10-CM

## 2024-10-09 LAB
BILIRUB SERPL-MCNC: NORMAL MG/DL
BLOOD URINE, POC: NORMAL
CLARITY, UA: NORMAL
COLOR, UA: NORMAL
GLUCOSE UR QL STRIP: NORMAL
KETONES UR QL STRIP: NORMAL
LEUKOCYTE ESTERASE URINE, POC: NORMAL
NITRITE, POC UA: NORMAL
PH, POC UA: 8
PROTEIN, POC: NORMAL
SPECIFIC GRAVITY, POC UA: 1
UROBILINOGEN, POC UA: 0.2

## 2024-10-09 PROCEDURE — 99999 PR PBB SHADOW E&M-EST. PATIENT-LVL III: CPT | Mod: PBBFAC,,, | Performed by: STUDENT IN AN ORGANIZED HEALTH CARE EDUCATION/TRAINING PROGRAM

## 2024-10-09 PROCEDURE — 87086 URINE CULTURE/COLONY COUNT: CPT | Mod: ,,, | Performed by: CLINICAL MEDICAL LABORATORY

## 2024-10-09 PROCEDURE — 99213 OFFICE O/P EST LOW 20 MIN: CPT | Mod: PBBFAC | Performed by: STUDENT IN AN ORGANIZED HEALTH CARE EDUCATION/TRAINING PROGRAM

## 2024-10-09 PROCEDURE — 99999PBSHW POCT URINALYSIS W/O SCOPE: Mod: PBBFAC,,,

## 2024-10-09 PROCEDURE — 0502F SUBSEQUENT PRENATAL CARE: CPT | Mod: ,,, | Performed by: STUDENT IN AN ORGANIZED HEALTH CARE EDUCATION/TRAINING PROGRAM

## 2024-10-09 PROCEDURE — 81003 URINALYSIS AUTO W/O SCOPE: CPT | Mod: PBBFAC | Performed by: STUDENT IN AN ORGANIZED HEALTH CARE EDUCATION/TRAINING PROGRAM

## 2024-10-09 NOTE — PROGRESS NOTES
Return OB Visit    29 y.o. female  at 37w0d   She c/o irregular contractions.   Reports good fetal movement or fluttering. Denies any vaginal bleeding, leakage of fluid, cramping, contractions, or pressure.   Total weight gain/weight loss in pregnancy: 3.629 kg (8 lb)     Vitals  BP: 116/68  Pulse: 67  Weight: 84.4 kg (186 lb)  Prenatal  Fetal Heart Rate: 120s  Movement: Present  Cervical Exam  Dilation: 1  Presentation: Vertex  Dilation/Effacement/Station  Dilation: 1    Prenatal Labs:  Lab Results   Component Value Date    GROUPTRH A POS 2024    HGB 11.9 (L) 2024    HCT 36.0 (L) 2024     2024    HEPBSAG Non-Reactive 2024    [id number] Non-Reactive 2024    LABNGO Negative 10/02/2024    LABURIN 25,000 Staphylococcus aureus (A) 2024    LABURIN 8,000 Yeast (A) 2024       A: 37w0d           ICD-10-CM ICD-9-CM    1. Heartburn during pregnancy in third trimester  O26.893 646.83     R12 787.1       2. Frequency of urination  R35.0 788.41 POCT URINALYSIS W/O SCOPE      3. 37 weeks gestation of pregnancy  Z3A.37 V22.2       4. Urinary tract infection in mother during third trimester of pregnancy  O23.43 646.63 Urine culture     599.0       5. Encounter for elective induction of labor  Z34.90 V22.1 IP OB Labor Induction          P: Bleeding, daily fetal kick counts, and  labor/labor precautions discussed.    The following were addressed during this visit:    37-41 Weeks  - When to go to the hospital   - Fetal kick counts/PIH/Bleeding/ROM/Labor precautions   - Breastfeeding review: benefits of breastfeeding, early skin to skin, 24/7 rooming in, exclusive breastfeeding for 6 months       Orders Placed This Encounter   Procedures    Urine culture    IP OB Labor Induction     Standing Status:   Future     Standing Expiration Date:   10/9/2025     Order Specific Question:   Location of Procedure:     Answer:   [facility]     Order Specific Question:   Reason for  Labor Induction     Answer:   Provider Preference     Order Specific Question:   Does patient have outpatient cervical ripening?     Answer:   No    POCT URINALYSIS W/O SCOPE       Questions answered to desired level of satisfaction  Verbalized understanding to all information and instructions provided.  Follow up in about 1 week (around 10/16/2024) for OBV.    Alyse Boswell, DO [facility] OBG[facility]

## 2024-10-11 LAB — UA COMPLETE W REFLEX CULTURE PNL UR: NORMAL

## 2024-10-16 ENCOUNTER — ROUTINE PRENATAL (OUTPATIENT)
Dept: OBSTETRICS AND GYNECOLOGY | Facility: CLINIC | Age: 29
End: 2024-10-16
Payer: COMMERCIAL

## 2024-10-16 VITALS
HEART RATE: 85 BPM | WEIGHT: 184.63 LBS | SYSTOLIC BLOOD PRESSURE: 129 MMHG | BODY MASS INDEX: 31.69 KG/M2 | DIASTOLIC BLOOD PRESSURE: 64 MMHG

## 2024-10-16 DIAGNOSIS — O26.893 HEARTBURN DURING PREGNANCY IN THIRD TRIMESTER: Primary | ICD-10-CM

## 2024-10-16 DIAGNOSIS — Z3A.38 38 WEEKS GESTATION OF PREGNANCY: ICD-10-CM

## 2024-10-16 DIAGNOSIS — R82.998 LEUKOCYTES IN URINE: ICD-10-CM

## 2024-10-16 DIAGNOSIS — R12 HEARTBURN DURING PREGNANCY IN THIRD TRIMESTER: Primary | ICD-10-CM

## 2024-10-16 DIAGNOSIS — R35.0 FREQUENCY OF URINATION: ICD-10-CM

## 2024-10-16 LAB
BILIRUB SERPL-MCNC: NORMAL MG/DL
BLOOD URINE, POC: NORMAL
CLARITY, UA: CLEAR
COLOR, UA: YELLOW
GLUCOSE UR QL STRIP: NORMAL
KETONES UR QL STRIP: NORMAL
LEUKOCYTE ESTERASE URINE, POC: NORMAL
NITRITE, POC UA: NORMAL
PH, POC UA: 5
PROTEIN, POC: NORMAL
SPECIFIC GRAVITY, POC UA: 1
UROBILINOGEN, POC UA: 0.2

## 2024-10-16 PROCEDURE — 99999PBSHW POCT URINALYSIS W/O SCOPE: Mod: PBBFAC,,,

## 2024-10-16 PROCEDURE — 99999 PR PBB SHADOW E&M-EST. PATIENT-LVL III: CPT | Mod: PBBFAC,,, | Performed by: STUDENT IN AN ORGANIZED HEALTH CARE EDUCATION/TRAINING PROGRAM

## 2024-10-16 PROCEDURE — 87086 URINE CULTURE/COLONY COUNT: CPT | Mod: ,,, | Performed by: CLINICAL MEDICAL LABORATORY

## 2024-10-16 PROCEDURE — 81003 URINALYSIS AUTO W/O SCOPE: CPT | Mod: PBBFAC | Performed by: STUDENT IN AN ORGANIZED HEALTH CARE EDUCATION/TRAINING PROGRAM

## 2024-10-16 PROCEDURE — 0502F SUBSEQUENT PRENATAL CARE: CPT | Mod: ,,, | Performed by: STUDENT IN AN ORGANIZED HEALTH CARE EDUCATION/TRAINING PROGRAM

## 2024-10-16 PROCEDURE — 99213 OFFICE O/P EST LOW 20 MIN: CPT | Mod: PBBFAC | Performed by: STUDENT IN AN ORGANIZED HEALTH CARE EDUCATION/TRAINING PROGRAM

## 2024-10-16 NOTE — PROGRESS NOTES
Return OB Visit    29 y.o. female  at 38w0d   She has no complaints. Declines cervical check  Reports good fetal movement or fluttering. Denies any vaginal bleeding, leakage of fluid, cramping, contractions, or pressure.   Total weight gain/weight loss in pregnancy: 2.994 kg (6 lb 9.6 oz)     Vitals  BP: 129/64  Pulse: 85  Weight: 83.7 kg (184 lb 9.6 oz)  Prenatal  Fetal Heart Rate: 130s  Movement: Present    Prenatal Labs:  Lab Results   Component Value Date    GROUPTRH A POS 2024    HGB 11.9 (L) 2024    HCT 36.0 (L) 2024     2024    HEPBSAG Non-Reactive 2024    [id number] Non-Reactive 2024    LABNGO Negative 10/02/2024    LABURIN Skin/Urogenital Mitzy Isolated, no further workup. 10/16/2024       A: 38w0d           ICD-10-CM ICD-9-CM    1. Heartburn during pregnancy in third trimester  O26.893 646.83     R12 787.1       2. Frequency of urination  R35.0 788.41 POCT URINALYSIS W/O SCOPE      3. 38 weeks gestation of pregnancy  Z3A.38 V22.2       4. Leukocytes in urine  R82.998 791.7 Urine culture          P: Bleeding, daily fetal kick counts, and  labor/labor precautions discussed.    No pregnancy checklist tasks were completed during this visit, and no tasks are pending completion.      Orders Placed This Encounter   Procedures    Urine culture    POCT URINALYSIS W/O SCOPE       Questions answered to desired level of satisfaction  Verbalized understanding to all information and instructions provided.  IOL scheduled 10/23 midnight    Alyse Boswell, DO [facility]BG[facility]

## 2024-10-18 LAB — UA COMPLETE W REFLEX CULTURE PNL UR: NORMAL

## 2024-10-23 ENCOUNTER — ANESTHESIA (OUTPATIENT)
Dept: OBSTETRICS AND GYNECOLOGY | Facility: HOSPITAL | Age: 29
End: 2024-10-23
Payer: COMMERCIAL

## 2024-10-23 ENCOUNTER — ANESTHESIA EVENT (OUTPATIENT)
Dept: OBSTETRICS AND GYNECOLOGY | Facility: HOSPITAL | Age: 29
End: 2024-10-23
Payer: COMMERCIAL

## 2024-10-23 ENCOUNTER — HOSPITAL ENCOUNTER (INPATIENT)
Facility: HOSPITAL | Age: 29
LOS: 2 days | Discharge: HOME | End: 2024-10-25
Attending: STUDENT IN AN ORGANIZED HEALTH CARE EDUCATION/TRAINING PROGRAM | Admitting: STUDENT IN AN ORGANIZED HEALTH CARE EDUCATION/TRAINING PROGRAM
Payer: COMMERCIAL

## 2024-10-23 DIAGNOSIS — Z34.90 ENCOUNTER FOR ELECTIVE INDUCTION OF LABOR: Primary | ICD-10-CM

## 2024-10-23 PROBLEM — Z3A.39 39 WEEKS GESTATION OF PREGNANCY: Status: ACTIVE | Noted: 2024-09-04

## 2024-10-23 LAB
ALBUMIN SERPL BCP-MCNC: 2.9 G/DL (ref 3.5–5)
ALBUMIN/GLOB SERPL: 0.7 {RATIO}
ALP SERPL-CCNC: 135 U/L (ref 37–98)
ALT SERPL W P-5'-P-CCNC: 14 U/L (ref 13–56)
ANION GAP SERPL CALCULATED.3IONS-SCNC: 23 MMOL/L (ref 7–16)
AST SERPL W P-5'-P-CCNC: 13 U/L (ref 15–37)
BACTERIA #/AREA URNS HPF: ABNORMAL /HPF
BASOPHILS # BLD AUTO: 0.03 K/UL (ref 0–0.2)
BASOPHILS NFR BLD AUTO: 0.3 % (ref 0–1)
BILIRUB SERPL-MCNC: 0.4 MG/DL (ref ?–1.2)
BILIRUB UR QL STRIP: NEGATIVE
BUN SERPL-MCNC: 7 MG/DL (ref 7–18)
BUN/CREAT SERPL: 13 (ref 6–20)
CALCIUM SERPL-MCNC: 9.1 MG/DL (ref 8.5–10.1)
CHLORIDE SERPL-SCNC: 108 MMOL/L (ref 98–107)
CLARITY UR: CLEAR
CO2 SERPL-SCNC: 10 MMOL/L (ref 21–32)
COLOR UR: COLORLESS
CREAT SERPL-MCNC: 0.54 MG/DL (ref 0.55–1.02)
DIFFERENTIAL METHOD BLD: ABNORMAL
EGFR (NO RACE VARIABLE) (RUSH/TITUS): 130 ML/MIN/1.73M2
EOSINOPHIL # BLD AUTO: 0.1 K/UL (ref 0–0.5)
EOSINOPHIL NFR BLD AUTO: 1 % (ref 1–4)
ERYTHROCYTE [DISTWIDTH] IN BLOOD BY AUTOMATED COUNT: 14.1 % (ref 11.5–14.5)
GLOBULIN SER-MCNC: 4.1 G/DL (ref 2–4)
GLUCOSE SERPL-MCNC: 96 MG/DL (ref 74–106)
GLUCOSE UR STRIP-MCNC: NORMAL MG/DL
HBV SURFACE AG SERPL QL IA: NORMAL
HCO3 UR-SCNC: 22.5 MMOL/L (ref 24–28)
HCO3 UR-SCNC: 23.5 MMOL/L (ref 18–23)
HCT VFR BLD AUTO: 35.6 % (ref 38–47)
HGB BLD-MCNC: 11.8 G/DL (ref 12–16)
HIV 1+O+2 AB SERPL QL: NORMAL
IMM GRANULOCYTES # BLD AUTO: 0.05 K/UL (ref 0–0.04)
IMM GRANULOCYTES NFR BLD: 0.5 % (ref 0–0.4)
INDIRECT COOMBS: NORMAL
KETONES UR STRIP-SCNC: NEGATIVE MG/DL
LEUKOCYTE ESTERASE UR QL STRIP: ABNORMAL
LYMPHOCYTES # BLD AUTO: 3.43 K/UL (ref 1–4.8)
LYMPHOCYTES NFR BLD AUTO: 33.5 % (ref 27–41)
MCH RBC QN AUTO: 28.3 PG (ref 27–31)
MCHC RBC AUTO-ENTMCNC: 33.1 G/DL (ref 32–36)
MCV RBC AUTO: 85.4 FL (ref 80–96)
MONOCYTES # BLD AUTO: 0.72 K/UL (ref 0–0.8)
MONOCYTES NFR BLD AUTO: 7 % (ref 2–6)
MPC BLD CALC-MCNC: 10.7 FL (ref 9.4–12.4)
NEUTROPHILS # BLD AUTO: 5.92 K/UL (ref 1.8–7.7)
NEUTROPHILS NFR BLD AUTO: 57.7 % (ref 53–65)
NITRITE UR QL STRIP: NEGATIVE
NRBC # BLD AUTO: 0 X10E3/UL
NRBC, AUTO (.00): 0 %
PCO2 BLDA: 39 MMHG (ref 41–51)
PCO2 BLDA: 50 MMHG (ref 35–48)
PH SMN: 7.28 [PH] (ref 7.35–7.45)
PH SMN: 7.37 [PH]
PH UR STRIP: 6.5 PH UNITS
PLATELET # BLD AUTO: 335 K/UL (ref 150–400)
PO2 BLDA: 20 MMHG
PO2 BLDA: 22 MMHG (ref 25–40)
POC A-ADO2: ABNORMAL MMHG
POC BASE EXCESS: -2.5 MMOL/L (ref -2–2)
POC BASE EXCESS: -3.7 MMOL/L (ref -2–3)
POC SATURATED O2: 41.1 % (ref 95–98)
POC SATURATED O2: 53.4 %
POTASSIUM SERPL-SCNC: 3.7 MMOL/L (ref 3.5–5.1)
PROT SERPL-MCNC: 7 G/DL (ref 6.4–8.2)
PROT UR QL STRIP: NEGATIVE
RBC # BLD AUTO: 4.17 M/UL (ref 4.2–5.4)
RBC # UR STRIP: NEGATIVE /UL
RBC #/AREA URNS HPF: 5 /HPF
RH BLD: NORMAL
SODIUM SERPL-SCNC: 137 MMOL/L (ref 136–145)
SP GR UR STRIP: 1
SPECIMEN OUTDATE: NORMAL
SQUAMOUS #/AREA URNS LPF: ABNORMAL /HPF
SYPHILIS AB INTERPRETATION: NORMAL
UROBILINOGEN UR STRIP-ACNC: NORMAL MG/DL
WBC # BLD AUTO: 10.25 K/UL (ref 4.5–11)
WBC #/AREA URNS HPF: 2 /HPF
YEAST #/AREA URNS HPF: ABNORMAL /HPF

## 2024-10-23 PROCEDURE — 85025 COMPLETE CBC W/AUTO DIFF WBC: CPT | Performed by: STUDENT IN AN ORGANIZED HEALTH CARE EDUCATION/TRAINING PROGRAM

## 2024-10-23 PROCEDURE — 82803 BLOOD GASES ANY COMBINATION: CPT

## 2024-10-23 PROCEDURE — 63600175 PHARM REV CODE 636 W HCPCS: Performed by: STUDENT IN AN ORGANIZED HEALTH CARE EDUCATION/TRAINING PROGRAM

## 2024-10-23 PROCEDURE — 86900 BLOOD TYPING SEROLOGIC ABO: CPT | Performed by: STUDENT IN AN ORGANIZED HEALTH CARE EDUCATION/TRAINING PROGRAM

## 2024-10-23 PROCEDURE — 72100003 HC LABOR CARE, EA. ADDL. 8 HRS

## 2024-10-23 PROCEDURE — 25000003 PHARM REV CODE 250: Performed by: STUDENT IN AN ORGANIZED HEALTH CARE EDUCATION/TRAINING PROGRAM

## 2024-10-23 PROCEDURE — 72200005 HC VAGINAL DELIVERY LEVEL II

## 2024-10-23 PROCEDURE — 72100002 HC LABOR CARE, 1ST 8 HOURS

## 2024-10-23 PROCEDURE — 87389 HIV-1 AG W/HIV-1&-2 AB AG IA: CPT | Performed by: STUDENT IN AN ORGANIZED HEALTH CARE EDUCATION/TRAINING PROGRAM

## 2024-10-23 PROCEDURE — 10907ZC DRAINAGE OF AMNIOTIC FLUID, THERAPEUTIC FROM PRODUCTS OF CONCEPTION, VIA NATURAL OR ARTIFICIAL OPENING: ICD-10-PCS | Performed by: STUDENT IN AN ORGANIZED HEALTH CARE EDUCATION/TRAINING PROGRAM

## 2024-10-23 PROCEDURE — 86850 RBC ANTIBODY SCREEN: CPT | Performed by: STUDENT IN AN ORGANIZED HEALTH CARE EDUCATION/TRAINING PROGRAM

## 2024-10-23 PROCEDURE — 0HQ9XZZ REPAIR PERINEUM SKIN, EXTERNAL APPROACH: ICD-10-PCS | Performed by: STUDENT IN AN ORGANIZED HEALTH CARE EDUCATION/TRAINING PROGRAM

## 2024-10-23 PROCEDURE — 81001 URINALYSIS AUTO W/SCOPE: CPT | Performed by: STUDENT IN AN ORGANIZED HEALTH CARE EDUCATION/TRAINING PROGRAM

## 2024-10-23 PROCEDURE — 86780 TREPONEMA PALLIDUM: CPT | Performed by: STUDENT IN AN ORGANIZED HEALTH CARE EDUCATION/TRAINING PROGRAM

## 2024-10-23 PROCEDURE — 59400 OBSTETRICAL CARE: CPT | Mod: TH,,, | Performed by: STUDENT IN AN ORGANIZED HEALTH CARE EDUCATION/TRAINING PROGRAM

## 2024-10-23 PROCEDURE — 25000003 PHARM REV CODE 250: Performed by: ANESTHESIOLOGY

## 2024-10-23 PROCEDURE — 87340 HEPATITIS B SURFACE AG IA: CPT | Performed by: STUDENT IN AN ORGANIZED HEALTH CARE EDUCATION/TRAINING PROGRAM

## 2024-10-23 PROCEDURE — 0UQGXZZ REPAIR VAGINA, EXTERNAL APPROACH: ICD-10-PCS | Performed by: STUDENT IN AN ORGANIZED HEALTH CARE EDUCATION/TRAINING PROGRAM

## 2024-10-23 PROCEDURE — 86901 BLOOD TYPING SEROLOGIC RH(D): CPT | Performed by: STUDENT IN AN ORGANIZED HEALTH CARE EDUCATION/TRAINING PROGRAM

## 2024-10-23 PROCEDURE — 62326 NJX INTERLAMINAR LMBR/SAC: CPT | Mod: AA | Performed by: ANESTHESIOLOGY

## 2024-10-23 PROCEDURE — 80053 COMPREHEN METABOLIC PANEL: CPT | Performed by: STUDENT IN AN ORGANIZED HEALTH CARE EDUCATION/TRAINING PROGRAM

## 2024-10-23 PROCEDURE — 36415 COLL VENOUS BLD VENIPUNCTURE: CPT | Performed by: STUDENT IN AN ORGANIZED HEALTH CARE EDUCATION/TRAINING PROGRAM

## 2024-10-23 PROCEDURE — 63600175 PHARM REV CODE 636 W HCPCS: Performed by: ANESTHESIOLOGY

## 2024-10-23 PROCEDURE — 3E033VJ INTRODUCTION OF OTHER HORMONE INTO PERIPHERAL VEIN, PERCUTANEOUS APPROACH: ICD-10-PCS | Performed by: STUDENT IN AN ORGANIZED HEALTH CARE EDUCATION/TRAINING PROGRAM

## 2024-10-23 PROCEDURE — 81003 URINALYSIS AUTO W/O SCOPE: CPT | Performed by: STUDENT IN AN ORGANIZED HEALTH CARE EDUCATION/TRAINING PROGRAM

## 2024-10-23 PROCEDURE — 11000001 HC ACUTE MED/SURG PRIVATE ROOM

## 2024-10-23 PROCEDURE — 51701 INSERT BLADDER CATHETER: CPT

## 2024-10-23 PROCEDURE — 3E0P7VZ INTRODUCTION OF HORMONE INTO FEMALE REPRODUCTIVE, VIA NATURAL OR ARTIFICIAL OPENING: ICD-10-PCS | Performed by: STUDENT IN AN ORGANIZED HEALTH CARE EDUCATION/TRAINING PROGRAM

## 2024-10-23 RX ORDER — CARBOPROST TROMETHAMINE 250 UG/ML
250 INJECTION, SOLUTION INTRAMUSCULAR
Status: DISCONTINUED | OUTPATIENT
Start: 2024-10-23 | End: 2024-10-25 | Stop reason: HOSPADM

## 2024-10-23 RX ORDER — OXYTOCIN-SODIUM CHLORIDE 0.9% IV SOLN 30 UNIT/500ML 30-0.9/5 UT/ML-%
10 SOLUTION INTRAVENOUS ONCE AS NEEDED
Status: DISCONTINUED | OUTPATIENT
Start: 2024-10-23 | End: 2024-10-23

## 2024-10-23 RX ORDER — ONDANSETRON 4 MG/1
8 TABLET, ORALLY DISINTEGRATING ORAL EVERY 8 HOURS PRN
Status: DISCONTINUED | OUTPATIENT
Start: 2024-10-23 | End: 2024-10-25 | Stop reason: HOSPADM

## 2024-10-23 RX ORDER — MISOPROSTOL 200 UG/1
800 TABLET ORAL ONCE AS NEEDED
Status: DISCONTINUED | OUTPATIENT
Start: 2024-10-23 | End: 2024-10-25 | Stop reason: HOSPADM

## 2024-10-23 RX ORDER — DIPHENHYDRAMINE HYDROCHLORIDE 50 MG/ML
25 INJECTION INTRAMUSCULAR; INTRAVENOUS EVERY 4 HOURS PRN
Status: DISCONTINUED | OUTPATIENT
Start: 2024-10-23 | End: 2024-10-25 | Stop reason: HOSPADM

## 2024-10-23 RX ORDER — HYDROCODONE BITARTRATE AND ACETAMINOPHEN 5; 325 MG/1; MG/1
1 TABLET ORAL EVERY 4 HOURS PRN
Status: DISCONTINUED | OUTPATIENT
Start: 2024-10-23 | End: 2024-10-25 | Stop reason: HOSPADM

## 2024-10-23 RX ORDER — MUPIROCIN 20 MG/G
OINTMENT TOPICAL
Status: DISCONTINUED | OUTPATIENT
Start: 2024-10-23 | End: 2024-10-23

## 2024-10-23 RX ORDER — METHYLERGONOVINE MALEATE 0.2 MG/ML
200 INJECTION INTRAVENOUS ONCE AS NEEDED
Status: DISCONTINUED | OUTPATIENT
Start: 2024-10-23 | End: 2024-10-25 | Stop reason: HOSPADM

## 2024-10-23 RX ORDER — TRANEXAMIC ACID 10 MG/ML
1000 INJECTION, SOLUTION INTRAVENOUS EVERY 30 MIN PRN
Status: DISCONTINUED | OUTPATIENT
Start: 2024-10-23 | End: 2024-10-25 | Stop reason: HOSPADM

## 2024-10-23 RX ORDER — SIMETHICONE 80 MG
1 TABLET,CHEWABLE ORAL 4 TIMES DAILY PRN
Status: DISCONTINUED | OUTPATIENT
Start: 2024-10-23 | End: 2024-10-23

## 2024-10-23 RX ORDER — ACETAMINOPHEN 325 MG/1
650 TABLET ORAL EVERY 6 HOURS SCHEDULED
OUTPATIENT
Start: 2024-10-23

## 2024-10-23 RX ORDER — LIDOCAINE HYDROCHLORIDE 10 MG/ML
10 INJECTION, SOLUTION INFILTRATION; PERINEURAL ONCE AS NEEDED
Status: DISCONTINUED | OUTPATIENT
Start: 2024-10-23 | End: 2024-10-23

## 2024-10-23 RX ORDER — OXYTOCIN-SODIUM CHLORIDE 0.9% IV SOLN 30 UNIT/500ML 30-0.9/5 UT/ML-%
10 SOLUTION INTRAVENOUS ONCE AS NEEDED
Status: COMPLETED | OUTPATIENT
Start: 2024-10-23 | End: 2024-10-23

## 2024-10-23 RX ORDER — SODIUM CITRATE AND CITRIC ACID MONOHYDRATE 334; 500 MG/5ML; MG/5ML
30 SOLUTION ORAL ONCE
OUTPATIENT
Start: 2024-10-23 | End: 2024-10-23

## 2024-10-23 RX ORDER — PRENATAL WITH FERROUS FUM AND FOLIC ACID 3080; 920; 120; 400; 22; 1.84; 3; 20; 10; 1; 12; 200; 27; 25; 2 [IU]/1; [IU]/1; MG/1; [IU]/1; MG/1; MG/1; MG/1; MG/1; MG/1; MG/1; UG/1; MG/1; MG/1; MG/1; MG/1
1 TABLET ORAL DAILY
Status: DISCONTINUED | OUTPATIENT
Start: 2024-10-24 | End: 2024-10-25 | Stop reason: HOSPADM

## 2024-10-23 RX ORDER — TERBUTALINE SULFATE 1 MG/ML
0.25 INJECTION SUBCUTANEOUS
Status: DISCONTINUED | OUTPATIENT
Start: 2024-10-23 | End: 2024-10-23

## 2024-10-23 RX ORDER — CALCIUM CARBONATE 200(500)MG
500 TABLET,CHEWABLE ORAL 3 TIMES DAILY PRN
Status: DISCONTINUED | OUTPATIENT
Start: 2024-10-23 | End: 2024-10-25 | Stop reason: HOSPADM

## 2024-10-23 RX ORDER — DOCUSATE SODIUM 100 MG/1
200 CAPSULE, LIQUID FILLED ORAL 2 TIMES DAILY PRN
Status: DISCONTINUED | OUTPATIENT
Start: 2024-10-23 | End: 2024-10-25 | Stop reason: HOSPADM

## 2024-10-23 RX ORDER — OXYTOCIN-SODIUM CHLORIDE 0.9% IV SOLN 30 UNIT/500ML 30-0.9/5 UT/ML-%
95 SOLUTION INTRAVENOUS CONTINUOUS PRN
Status: DISCONTINUED | OUTPATIENT
Start: 2024-10-23 | End: 2024-10-25 | Stop reason: HOSPADM

## 2024-10-23 RX ORDER — OXYTOCIN-SODIUM CHLORIDE 0.9% IV SOLN 30 UNIT/500ML 30-0.9/5 UT/ML-%
0-32 SOLUTION INTRAVENOUS CONTINUOUS
Status: DISCONTINUED | OUTPATIENT
Start: 2024-10-23 | End: 2024-10-23

## 2024-10-23 RX ORDER — OXYTOCIN-SODIUM CHLORIDE 0.9% IV SOLN 30 UNIT/500ML 30-0.9/5 UT/ML-%
95 SOLUTION INTRAVENOUS ONCE AS NEEDED
Status: DISCONTINUED | OUTPATIENT
Start: 2024-10-23 | End: 2024-10-25 | Stop reason: HOSPADM

## 2024-10-23 RX ORDER — FENTANYL/ROPIVACAINE/NS/PF 2MCG/ML-.2
PLASTIC BAG, INJECTION (ML) INJECTION CONTINUOUS
Status: DISCONTINUED | OUTPATIENT
Start: 2024-10-23 | End: 2024-10-23

## 2024-10-23 RX ORDER — ROPIVACAINE HYDROCHLORIDE 7.5 MG/ML
INJECTION, SOLUTION EPIDURAL; PERINEURAL
Status: COMPLETED | OUTPATIENT
Start: 2024-10-23 | End: 2024-10-23

## 2024-10-23 RX ORDER — OXYTOCIN 10 [USP'U]/ML
10 INJECTION, SOLUTION INTRAMUSCULAR; INTRAVENOUS ONCE AS NEEDED
Status: DISCONTINUED | OUTPATIENT
Start: 2024-10-23 | End: 2024-10-25 | Stop reason: HOSPADM

## 2024-10-23 RX ORDER — ROPIVACAINE HYDROCHLORIDE 5 MG/ML
15 INJECTION, SOLUTION EPIDURAL; INFILTRATION; PERINEURAL ONCE
Status: COMPLETED | OUTPATIENT
Start: 2024-10-23 | End: 2024-10-23

## 2024-10-23 RX ORDER — DIPHENOXYLATE HYDROCHLORIDE AND ATROPINE SULFATE 2.5; .025 MG/1; MG/1
2 TABLET ORAL EVERY 6 HOURS PRN
Status: DISCONTINUED | OUTPATIENT
Start: 2024-10-23 | End: 2024-10-25 | Stop reason: HOSPADM

## 2024-10-23 RX ORDER — SODIUM CHLORIDE, SODIUM LACTATE, POTASSIUM CHLORIDE, CALCIUM CHLORIDE 600; 310; 30; 20 MG/100ML; MG/100ML; MG/100ML; MG/100ML
INJECTION, SOLUTION INTRAVENOUS CONTINUOUS
Status: DISCONTINUED | OUTPATIENT
Start: 2024-10-23 | End: 2024-10-23

## 2024-10-23 RX ORDER — OXYTOCIN-SODIUM CHLORIDE 0.9% IV SOLN 30 UNIT/500ML 30-0.9/5 UT/ML-%
95 SOLUTION INTRAVENOUS ONCE AS NEEDED
Status: DISCONTINUED | OUTPATIENT
Start: 2024-10-23 | End: 2024-10-23

## 2024-10-23 RX ORDER — HYDROCODONE BITARTRATE AND ACETAMINOPHEN 7.5; 325 MG/1; MG/1
1 TABLET ORAL EVERY 4 HOURS PRN
Status: DISCONTINUED | OUTPATIENT
Start: 2024-10-23 | End: 2024-10-25 | Stop reason: HOSPADM

## 2024-10-23 RX ORDER — MISOPROSTOL 100 MCG
50 TABLET ORAL EVERY 4 HOURS PRN
Status: DISCONTINUED | OUTPATIENT
Start: 2024-10-23 | End: 2024-10-23

## 2024-10-23 RX ORDER — DIPHENHYDRAMINE HCL 25 MG
25 CAPSULE ORAL EVERY 4 HOURS PRN
Status: DISCONTINUED | OUTPATIENT
Start: 2024-10-23 | End: 2024-10-25 | Stop reason: HOSPADM

## 2024-10-23 RX ORDER — SODIUM CHLORIDE 0.9 % (FLUSH) 0.9 %
10 SYRINGE (ML) INJECTION
Status: DISCONTINUED | OUTPATIENT
Start: 2024-10-23 | End: 2024-10-25 | Stop reason: HOSPADM

## 2024-10-23 RX ORDER — ONDANSETRON HYDROCHLORIDE 2 MG/ML
4 INJECTION, SOLUTION INTRAVENOUS ONCE
Status: DISCONTINUED | OUTPATIENT
Start: 2024-10-23 | End: 2024-10-23

## 2024-10-23 RX ORDER — HYDROCORTISONE 25 MG/G
CREAM TOPICAL 3 TIMES DAILY PRN
Status: DISCONTINUED | OUTPATIENT
Start: 2024-10-23 | End: 2024-10-25 | Stop reason: HOSPADM

## 2024-10-23 RX ORDER — SODIUM CHLORIDE 9 MG/ML
INJECTION, SOLUTION INTRAVENOUS
Status: DISCONTINUED | OUTPATIENT
Start: 2024-10-23 | End: 2024-10-23

## 2024-10-23 RX ORDER — OXYTOCIN-SODIUM CHLORIDE 0.9% IV SOLN 30 UNIT/500ML 30-0.9/5 UT/ML-%
95 SOLUTION INTRAVENOUS CONTINUOUS PRN
Status: DISCONTINUED | OUTPATIENT
Start: 2024-10-23 | End: 2024-10-23

## 2024-10-23 RX ORDER — IBUPROFEN 800 MG/1
800 TABLET ORAL EVERY 8 HOURS PRN
Status: DISCONTINUED | OUTPATIENT
Start: 2024-10-23 | End: 2024-10-25 | Stop reason: HOSPADM

## 2024-10-23 RX ORDER — DIPHENHYDRAMINE HYDROCHLORIDE 50 MG/ML
12.5 INJECTION INTRAMUSCULAR; INTRAVENOUS EVERY 4 HOURS PRN
Status: DISCONTINUED | OUTPATIENT
Start: 2024-10-23 | End: 2024-10-23

## 2024-10-23 RX ORDER — SIMETHICONE 80 MG
1 TABLET,CHEWABLE ORAL EVERY 6 HOURS PRN
Status: DISCONTINUED | OUTPATIENT
Start: 2024-10-23 | End: 2024-10-25 | Stop reason: HOSPADM

## 2024-10-23 RX ORDER — FAMOTIDINE 10 MG/ML
20 INJECTION INTRAVENOUS ONCE
Status: DISCONTINUED | OUTPATIENT
Start: 2024-10-23 | End: 2024-10-23

## 2024-10-23 RX ORDER — EPHEDRINE SULFATE 50 MG/ML
10 INJECTION, SOLUTION INTRAVENOUS ONCE AS NEEDED
Status: DISCONTINUED | OUTPATIENT
Start: 2024-10-23 | End: 2024-10-23

## 2024-10-23 RX ADMIN — ROPIVACAINE HYDROCHLORIDE 500 MCG: 10 INJECTION, SOLUTION EPIDURAL at 09:10

## 2024-10-23 RX ADMIN — ROPIVACAINE HYDROCHLORIDE 5 ML: 7.5 INJECTION, SOLUTION EPIDURAL; PERINEURAL at 09:10

## 2024-10-23 RX ADMIN — MISOPROSTOL 50 MCG: 100 TABLET ORAL at 01:10

## 2024-10-23 RX ADMIN — HYDROCORTISONE 2.5%: 25 CREAM TOPICAL at 06:10

## 2024-10-23 RX ADMIN — SODIUM CHLORIDE, POTASSIUM CHLORIDE, SODIUM LACTATE AND CALCIUM CHLORIDE: 600; 310; 30; 20 INJECTION, SOLUTION INTRAVENOUS at 06:10

## 2024-10-23 RX ADMIN — DIPHENHYDRAMINE HYDROCHLORIDE 25 MG: 25 CAPSULE ORAL at 12:10

## 2024-10-23 RX ADMIN — SODIUM CHLORIDE, POTASSIUM CHLORIDE, SODIUM LACTATE AND CALCIUM CHLORIDE: 600; 310; 30; 20 INJECTION, SOLUTION INTRAVENOUS at 07:10

## 2024-10-23 RX ADMIN — OXYTOCIN 2 MILLI-UNITS/MIN: 10 INJECTION INTRAVENOUS at 10:10

## 2024-10-23 RX ADMIN — IBUPROFEN 800 MG: 800 TABLET, FILM COATED ORAL at 06:10

## 2024-10-23 RX ADMIN — OXYTOCIN-SODIUM CHLORIDE 0.9% IV SOLN 30 UNIT/500ML 10 UNITS: 30-0.9/5 SOLUTION at 03:10

## 2024-10-23 RX ADMIN — MISOPROSTOL 50 MCG: 100 TABLET ORAL at 06:10

## 2024-10-23 RX ADMIN — ROPIVACAINE HYDROCHLORIDE 15 ML: 5 INJECTION, SOLUTION EPIDURAL; INFILTRATION; PERINEURAL at 09:10

## 2024-10-23 NOTE — H&P
[facility] -  Labor and Delivery  Obstetrics  History & Physical    Patient Name: Lorenza Hardin  MRN: [id number]  Admission Date: 10/23/2024  Primary Care Provider: Tiana, Primary Doctor    Subjective:     Principal Problem:Encounter for elective induction of labor    History of Present Illness:  29 y.o.  at 39w0d presents to L&D for scheduled IOL. She denies vaginal bleeding, LOF, ctx. Reports good fetal movement. Prenatal care with Dr. Beltran. Pregnancy uncomplicated. A pos, GBS neg, rubella imm.        Obstetric HPI:  Patient reports None contractions, active fetal movement, No vaginal bleeding , No loss of fluid       OB History    Para Term  AB Living   3 1 1 0 1 1   SAB IAB Ectopic Multiple Live Births   1 0 0 0 1      # Outcome Date GA Lbr Giles/2nd Weight Sex Type Anes PTL Lv   3 Current            2 Term 22 39w5d 08:23 / 02:47 3.678 kg (8 lb 1.7 oz) M Vag-Spont EPI N MARIANO      Name: SERGIO HARDIN      Apgar1: 8  Apgar5: 9   1 SAB              No past medical history on file.  Past Surgical History:   Procedure Laterality Date    DILATION AND CURETTAGE OF UTERUS  2022    DILATION AND CURETTAGE OF UTERUS USING SUCTION N/A 2021    Procedure: DILATION AND CURETTAGE, UTERUS, USING SUCTION;  Surgeon: Onel Beltran DO;  Location: [facility] OR;  Service: OB/GYN;  Laterality: N/A;       PTA Medications   Medication Sig    famotidine (PEPCID) 20 MG tablet Take 20 mg by mouth 2 (two) times daily.    ondansetron (ZOFRAN-ODT) 4 MG TbDL Take 1 tablet (4 mg total) by mouth every 6 (six) hours as needed (nausea).    prenatal vit no.124/iron/folic (PRENATAL VITAMIN ORAL) Take 1 capsule by mouth once daily at 6am.       Review of patient's allergies indicates:  No Known Allergies     Family History    Family history is unknown by patient.       Tobacco Use    Smoking status: Never    Smokeless tobacco: Never   Substance and Sexual Activity    Alcohol use: Not  Currently    Drug use: Never    Sexual activity: Yes     Partners: Male     Review of Systems   All other systems reviewed and are negative.     Objective:     Vital Signs (Most Recent):  Temp: 98 °F (36.7 °C) (10/23/24 0048)  Pulse: 75 (10/23/24 0718)  Resp: 18 (10/23/24 0048)  BP: 126/79 (10/23/24 0718)  SpO2: (!) 92 % (10/23/24 0717) Vital Signs (24h Range):  Temp:  [98 °F (36.7 °C)] 98 °F (36.7 °C)  Pulse:  [59-81] 75  Resp:  [18] 18  SpO2:  [92 %-100 %] 92 %  BP: (101-126)/(55-79) 126/79     Weight: 84.1 kg (185 lb 6.4 oz)  Body mass index is 31.82 kg/m².    FHT: 120 Cat 1 (reassuring)  TOCO:  Q 1-7 minutes     Physical Exam:   Constitutional: She is oriented to person, place, and time. She appears well-developed and well-nourished.    HENT:   Head: Normocephalic and atraumatic.      Cardiovascular:  Normal rate.             Pulmonary/Chest: Effort normal.        Abdominal: Soft. There is no abdominal tenderness.     Genitourinary:    Uterus normal.             Musculoskeletal: Normal range of motion.       Neurological: She is alert and oriented to person, place, and time.    Skin: Skin is warm and dry.    Psychiatric: She has a normal mood and affect. Her behavior is normal. Judgment and thought content normal.        Cervix:  Dilation:  1  Effacement:  50%  Station: -3  Presentation: Vertex     Significant Labs:  Lab Results   Component Value Date    GROUPTRH A POS 10/23/2024    HEPBSAG Non-Reactive 10/23/2024    AFP 25.1 06/30/2022       CBC:   Recent Labs   Lab 10/23/24  0054   WBC 10.25   RBC 4.17*   HGB 11.8*   HCT 35.6*      MCV 85.4   MCH 28.3   MCHC 33.1     I have personallly reviewed all pertinent lab results from the last 24 hours.  Recent Lab Results         10/23/24  0054   10/23/24  0029        Albumin/Globulin Ratio 0.7         Albumin 2.9                  ALT 14         Anion Gap 23         Appearance, UA   Clear       AST 13         Bacteria, UA   Occasional       Baso # 0.03          Basophil % 0.3         Bilirubin (UA)   Negative       BILIRUBIN TOTAL 0.4         BUN 7         BUN/CREAT RATIO 13         Calcium 9.1         Chloride 108         CO2 10         Color, UA   Colorless       Creatinine 0.54         Differential Method Auto         eGFR 130         Eos # 0.10         Eos % 1.0         Globulin, Total 4.1         Glucose 96         Glucose, UA   Normal       Group & Rh A POS         Hematocrit 35.6         Hemoglobin 11.8         Hepatitis B Surface Ag Non-Reactive         HIV 1/2 Ag/Ab Non-Reactive         Immature Grans (Abs) 0.05         Immature Granulocytes 0.5         INDIRECT ANJELICA NEG         Ketones, UA   Negative       Leukocyte Esterase, UA   Moderate       Lymph # 3.43         Lymph % 33.5         MCH 28.3         MCHC 33.1         MCV 85.4         Mono # 0.72         Mono % 7.0         MPV 10.7         Neutrophils, Abs 5.92         Neutrophils Relative 57.7         NITRITE UA   Negative       nRBC 0.0         NUCLEATED RBC ABSOLUTE 0.00         Blood, UA   Negative       pH, UA   6.5       Platelet Count 335         Potassium 3.7         PROTEIN TOTAL 7.0         Protein, UA   Negative       RBC 4.17         RBC, UA   5       RDW 14.1         Sodium 137         Spec Grav UA   1.003       Specimen Outdate 10/26/2024 23:59         Squamous Epithelial Cells, UA   Occasional       Syphilis Ab Interpretation Non-Reactive  Comment: 0.0 - 0.9: Non-Reactive  0.91 - 1.10: Equivocal with RPR to follow  >1.10:  Reactive with RPR to Follow         UROBILINOGEN UA   Normal       WBC, UA   2       WBC 10.25         Yeast, UA   Occasional             Assessment/Plan:     29 y.o. female  at 39w0d for:    * Encounter for elective induction of labor  Cytotec IOL  Anticipate vaginal delivery  GBS neg    39 weeks gestation of pregnancy       History of vacuum extraction assisted delivery           Onel Jarvis, DO  Obstetrics  [facility] -  Labor and  Delivery

## 2024-10-23 NOTE — HPI
29 y.o.  at 39w0d presents to L&D for scheduled IOL. She denies vaginal bleeding, LOF, ctx. Reports good fetal movement. Prenatal care with Dr. Beltran. Pregnancy uncomplicated. A pos, GBS neg, rubella imm.

## 2024-10-23 NOTE — L&D DELIVERY NOTE
"[facility] -  Labor and Delivery  Vaginal Delivery   Operative Note    SUMMARY     Normal spontaneous vaginal delivery of live infant, was placed on mothers abdomen for skin to skin and bulb suctioning performed.  Infant delivered position OA over intact perineum.  Nuchal cord: No.    Spontaneous delivery of placenta and IV pitocin given noting good uterine tone.  R vaginal laceration repaired with 3-0 vicryl  Patient tolerated delivery well. Sponge needle and lap counted correctly x2.    Indications: Encounter for elective induction of labor  Pregnancy complicated by:   Patient Active Problem List   Diagnosis    Encounter for elective induction of labor    History of vacuum extraction assisted delivery    Heartburn during pregnancy in third trimester    39 weeks gestation of pregnancy     Admitting GA: 39w0d    Delivery Information for Americo Santiago    Birth information:  YOB: 2022   Time of birth: 3:46 PM   Sex: female   Head Delivery Date/Time: 10/23/2024  3:46 PM   Delivery type: Vaginal, Spontaneous   Gestational Age: 39w0d       Delivery Providers    Delivering clinician: Onel Beltran DO           Measurements    Weight: 2695 g  Weight (lbs): 5 lb 15.1 oz  Length: 48.3 cm  Length (in): 19"         Apgars    Living status: Living  Apgar Component Scores:  1 min.:  5 min.:  10 min.:  15 min.:  20 min.:    Skin color:  0  1       Heart rate:  2  2       Reflex irritability:  2  2       Muscle tone:  2  2       Respiratory effort:  2  2       Total:  8  9       Apgars assigned by: MANI SHELLEY RN         Operative Delivery    Forceps attempted?: No  Vacuum extractor attempted?: No         Shoulder Dystocia    Shoulder dystocia present?: No           Presentation    Presentation: Vertex  Position: Middle Occiput Anterior           Interventions/Resuscitation    Method: Bulb Suctioning, Tactile Stimulation       Cord    Vessels: 3 vessels  Complications: None  Cord Blood " Disposition: Sent with Baby  Gases Sent?: Yes       Placenta    Placenta delivery date/time: 10/23/2024 1549  Placenta removal: Spontaneous  Placenta appearance: Intact  Placenta disposition: Discarded           Labor Events:       labor: No     Labor Onset Date/Time: 10/23/2024 07:05     Dilation Complete Date/Time: 10/23/2024 15:37     Start Pushing Date/Time: 10/23/2024 15:37     Rupture Date/Time: 10/23/24 0747        Rupture type: ARM (Artificial Rupture)        Fluid Amount:       Fluid Color: Clear      steroids: None     Antibiotics given for GBS: No     Induction: amniotomy;misoprostol;oxytocin     Indications for induction:  Elective     Augmentation:       Indications for augmentation:       Labor complications: None     Additional complications:          Cervical ripening:                     Delivery:      Episiotomy: None     Indication for Episiotomy:       Perineal Lacerations: 1st Repaired:      Periurethral Laceration:   Repaired:     Labial Laceration:   Repaired:     Sulcus Laceration:   Repaired:     Vaginal Laceration: Yes Repaired: Yes   Cervical Laceration:   Repaired:     Repair suture:       Repair # of packets: 2     Last Value - EBL - Nursing (mL):       Sum - EBL - Nursing (mL): 0     Last Value - EBL - Anesthesia (mL):      Calculated QBL (mL): 102     Running total QBL (mL): 102     Vaginal Sweep Performed: Yes     Surgicount Correct: Yes       Other providers:       Anesthesia    Method: Epidural          Details (if applicable):  Trial of Labor      Categorization:      Priority:     Indications for :     Incision Type:       Additional  information:  Forceps:    Vacuum:    Breech:    Observed anomalies    Other (Comments):

## 2024-10-23 NOTE — ANESTHESIA PREPROCEDURE EVALUATION
10/23/2024  Lorenza Santiago is a 29 y.o., female.      Pre-op Assessment    I have reviewed the Patient Summary Reports.     I have reviewed the Nursing Notes. I have reviewed the NPO Status.   I have reviewed the Medications.     Review of Systems  Anesthesia Hx:  No problems with previous Anesthesia             Denies Family Hx of Anesthesia complications.    Denies Personal Hx of Anesthesia complications.                    Social:  Non-Smoker, No Alcohol Use       Cardiovascular:  Exercise tolerance: good                                             OB/GYN/PEDS:   @ 39wk ga for IOL requesting MARIIA           No past medical history on file.  Past Surgical History:   Procedure Laterality Date    DILATION AND CURETTAGE OF UTERUS  2022    DILATION AND CURETTAGE OF UTERUS USING SUCTION N/A 2021    Procedure: DILATION AND CURETTAGE, UTERUS, USING SUCTION;  Surgeon: Onel Beltran DO;  Location: [facility];  Service: OB/GYN;  Laterality: N/A;         Physical Exam  General: Well nourished, Cooperative and Alert    Airway:  Mallampati: II   Mouth Opening: Normal  TM Distance: Normal  Tongue: Normal  Neck ROM: Normal ROM    Dental:  Intact    Chest/Lungs:  Clear to auscultation, Normal Respiratory Rate    Heart:  Rate: Normal  Rhythm: Regular Rhythm        Chemistry        Component Value Date/Time     10/23/2024 0054    K 3.7 10/23/2024 0054     (H) 10/23/2024 0054    CO2 10 (L) 10/23/2024 0054    BUN 7 10/23/2024 0054    CREATININE 0.54 (L) 10/23/2024 005    GLU 96 10/23/2024 005        Component Value Date/Time    CALCIUM 9.1 10/23/2024 0054    ALKPHOS 135 (H) 10/23/2024 0054    AST 13 (L) 10/23/2024 0054    ALT 14 10/23/2024 0054    BILITOT 0.4 10/23/2024 005    EGFRNONAA 123 2022 1157        Lab Results   Component Value Date    WBC 10.25 10/23/2024    HGB 11.8  (L) 10/23/2024    HCT 35.6 (L) 10/23/2024     10/23/2024     Results for orders placed or performed in visit on 11/30/21   EKG 12-lead    Collection Time: 11/30/21  7:18 AM    Narrative    Test Reason :     Vent. Rate : 072 BPM     Atrial Rate : 072 BPM     P-R Int : 108 ms          QRS Dur : 076 ms      QT Int : 392 ms       P-R-T Axes : 065 055 051 degrees     QTc Int : 429 ms    Sinus rhythm with short ND  Otherwise normal ECG  No previous ECGs available  Confirmed by Gagan Boyd MD [id number]) on 12/2/2021 7:57:07 PM    Referred By: ADY XAVIER           Confirmed By:Gagan Boyd MD         Anesthesia Plan  Type of Anesthesia, risks & benefits discussed:    Anesthesia Type: Epidural  Intra-op Monitoring Plan: Standard ASA Monitors  Post Op Pain Control Plan: epidural analgesia  Informed Consent: Informed consent signed with the Patient and all parties understand the risks and agree with anesthesia plan.  All questions answered. Patient consented to blood products? Yes  ASA Score: 2  Day of Surgery Review of History & Physical: H&P Update referred to the surgeon/provider.I have interviewed and examined the patient. I have reviewed the patient's H&P dated: There are no significant changes.     Ready For Surgery From Anesthesia Perspective.     .

## 2024-10-23 NOTE — SUBJECTIVE & OBJECTIVE
Obstetric HPI:  Patient reports None contractions, active fetal movement, No vaginal bleeding , No loss of fluid       OB History    Para Term  AB Living   3 1 1 0 1 1   SAB IAB Ectopic Multiple Live Births   1 0 0 0 1      # Outcome Date GA Lbr Giles/2nd Weight Sex Type Anes PTL Lv   3 Current            2 Term 22 39w5d 08:23 / 02:47 3.678 kg (8 lb 1.7 oz) M Vag-Spont EPI N MARIANO      Name: SERGOI HARDIN      Apgar1: 8  Apgar5: 9   1 SAB              No past medical history on file.  Past Surgical History:   Procedure Laterality Date    DILATION AND CURETTAGE OF UTERUS  2022    DILATION AND CURETTAGE OF UTERUS USING SUCTION N/A 2021    Procedure: DILATION AND CURETTAGE, UTERUS, USING SUCTION;  Surgeon: Onel Beltran DO;  Location: [facility];  Service: OB/GYN;  Laterality: N/A;       PTA Medications   Medication Sig    famotidine (PEPCID) 20 MG tablet Take 20 mg by mouth 2 (two) times daily.    ondansetron (ZOFRAN-ODT) 4 MG TbDL Take 1 tablet (4 mg total) by mouth every 6 (six) hours as needed (nausea).    prenatal vit no.124/iron/folic (PRENATAL VITAMIN ORAL) Take 1 capsule by mouth once daily at 6am.       Review of patient's allergies indicates:  No Known Allergies     Family History    Family history is unknown by patient.       Tobacco Use    Smoking status: Never    Smokeless tobacco: Never   Substance and Sexual Activity    Alcohol use: Not Currently    Drug use: Never    Sexual activity: Yes     Partners: Male     Review of Systems   All other systems reviewed and are negative.     Objective:     Vital Signs (Most Recent):  Temp: 98 °F (36.7 °C) (10/23/24 0048)  Pulse: 75 (10/23/24 0718)  Resp: 18 (10/23/24 0048)  BP: 126/79 (10/23/24 0718)  SpO2: (!) 92 % (10/23/24 0717) Vital Signs (24h Range):  Temp:  [98 °F (36.7 °C)] 98 °F (36.7 °C)  Pulse:  [59-81] 75  Resp:  [18] 18  SpO2:  [92 %-100 %] 92 %  BP: (101-126)/(55-79) 126/79     Weight: 84.1 kg (185 lb 6.4  oz)  Body mass index is 31.82 kg/m².    FHT: 120 Cat 1 (reassuring)  TOCO:  Q 1-7 minutes     Physical Exam:   Constitutional: She is oriented to person, place, and time. She appears well-developed and well-nourished.    HENT:   Head: Normocephalic and atraumatic.      Cardiovascular:  Normal rate.             Pulmonary/Chest: Effort normal.        Abdominal: Soft. There is no abdominal tenderness.     Genitourinary:    Uterus normal.             Musculoskeletal: Normal range of motion.       Neurological: She is alert and oriented to person, place, and time.    Skin: Skin is warm and dry.    Psychiatric: She has a normal mood and affect. Her behavior is normal. Judgment and thought content normal.        Cervix:  Dilation:  1  Effacement:  50%  Station: -3  Presentation: Vertex     Significant Labs:  Lab Results   Component Value Date    GROUPTRH A POS 10/23/2024    HEPBSAG Non-Reactive 10/23/2024    AFP 25.1 06/30/2022       CBC:   Recent Labs   Lab 10/23/24  0054   WBC 10.25   RBC 4.17*   HGB 11.8*   HCT 35.6*      MCV 85.4   MCH 28.3   MCHC 33.1     I have personallly reviewed all pertinent lab results from the last 24 hours.  Recent Lab Results         10/23/24  0054   10/23/24  0029        Albumin/Globulin Ratio 0.7         Albumin 2.9                  ALT 14         Anion Gap 23         Appearance, UA   Clear       AST 13         Bacteria, UA   Occasional       Baso # 0.03         Basophil % 0.3         Bilirubin (UA)   Negative       BILIRUBIN TOTAL 0.4         BUN 7         BUN/CREAT RATIO 13         Calcium 9.1         Chloride 108         CO2 10         Color, UA   Colorless       Creatinine 0.54         Differential Method Auto         eGFR 130         Eos # 0.10         Eos % 1.0         Globulin, Total 4.1         Glucose 96         Glucose, UA   Normal       Group & Rh A POS         Hematocrit 35.6         Hemoglobin 11.8         Hepatitis B Surface Ag Non-Reactive         HIV 1/2 Ag/Ab  Non-Reactive         Immature Grans (Abs) 0.05         Immature Granulocytes 0.5         INDIRECT ANJELICA NEG         Ketones, UA   Negative       Leukocyte Esterase, UA   Moderate       Lymph # 3.43         Lymph % 33.5         MCH 28.3         MCHC 33.1         MCV 85.4         Mono # 0.72         Mono % 7.0         MPV 10.7         Neutrophils, Abs 5.92         Neutrophils Relative 57.7         NITRITE UA   Negative       nRBC 0.0         NUCLEATED RBC ABSOLUTE 0.00         Blood, UA   Negative       pH, UA   6.5       Platelet Count 335         Potassium 3.7         PROTEIN TOTAL 7.0         Protein, UA   Negative       RBC 4.17         RBC, UA   5       RDW 14.1         Sodium 137         Spec Grav UA   1.003       Specimen Outdate 10/26/2024 23:59         Squamous Epithelial Cells, UA   Occasional       Syphilis Ab Interpretation Non-Reactive  Comment: 0.0 - 0.9: Non-Reactive  0.91 - 1.10: Equivocal with RPR to follow  >1.10:  Reactive with RPR to Follow         UROBILINOGEN UA   Normal       WBC, UA   2       WBC 10.25         Yeast, UA   Occasional

## 2024-10-23 NOTE — ANESTHESIA PROCEDURE NOTES
Epidural    Patient location during procedure: OB   Reason for block: primary anesthetic   Reason for block: labor analgesia requested by patient and obstetrician  Diagnosis: iup   Start time: 10/23/2024 9:10 AM  Timeout: 10/23/2024 9:10 AM  End time: 10/23/2024 9:30 AM    Staffing  Performing Provider: Hood Mckeon DO  Authorizing Provider: Hood Mckeon DO    Staffing  Performed by: Hood Mckeon DO  Authorized by: Hood Mckeon DO        Preanesthetic Checklist  Completed: patient identified, IV checked, site marked, risks and benefits discussed, surgical consent, monitors and equipment checked, pre-op evaluation, timeout performed, anesthesia consent given, hand hygiene performed and patient being monitored  Preparation  Patient position: sitting  Prep: ChloraPrep  Patient monitoring: ECG, Pulse Ox and Blood Pressure  Reason for block: primary anesthetic   Epidural  Skin Anesthetic: lidocaine 1%  Skin Wheal: 1 mL  Administration type: continuous  Approach: midline  Interspace: L4-5    Injection technique: EFRAIN saline  Needle and Epidural Catheter  Needle type: Tuohy   Needle gauge: 18  Needle length: 3.5 inches  Needle insertion depth: 6 cm  Catheter type: end hole  Catheter size: 20 G  Catheter at skin depth: 10 cm  Insertion Attempts: 1  Test dose: 3 mL of lidocaine 1.5% with Epi 1-to-200,000  Additional Documentation: incremental injection, no paresthesia on injection and negative aspiration for heme and CSF  Needle localization: anatomical landmarks  Assessment  Ease of block: easy  Patient's tolerance of the procedure: comfortable throughout block No inadvertent dural puncture with Tuohy.  Dural puncture not performed with spinal needle    Medications:    Medications: ROPIvacaine (NAROPIN) injection 0.75% - Epidural   5 mL - 10/23/2024 9:25:00 AM

## 2024-10-23 NOTE — ANESTHESIA POSTPROCEDURE EVALUATION
Anesthesia Post Evaluation    Patient: Lorenza Santiago    Procedure(s) Performed: * No procedures listed *    Final Anesthesia Type: epidural      Patient location during evaluation: PACU  Patient participation: Yes- Able to Participate  Level of consciousness: awake and alert and oriented  Post-procedure vital signs: reviewed and stable  Pain management: adequate  Airway patency: patent  JAMES mitigation strategies: Use of major conduction anesthesia (spinal/epidural) or peripheral nerve block  PONV status at discharge: No PONV  Anesthetic complications: no      Cardiovascular status: hemodynamically stable  Respiratory status: unassisted and spontaneous ventilation  Hydration status: euvolemic  Follow-up not needed.              Vitals Value Taken Time   /67 10/23/24 1705   Temp 36.1 °C (97 °F) 10/23/24 1554   Pulse 60 10/23/24 1705   Resp 20 10/23/24 1600   SpO2 98 % 10/23/24 1542   Vitals shown include unfiled device data.      No case tracking events are documented in the log.      Pain/Vale Score: Pain Rating Prior to Med Admin: 6 (Epidural placed per Dr. Mckeon.) (10/23/2024  9:17 AM)  Pain Rating Post Med Admin: 0 (10/23/2024  9:35 AM)  Vale Score: 9 (10/23/2024  4:00 PM)

## 2024-10-24 LAB
BASOPHILS # BLD AUTO: 0.03 K/UL (ref 0–0.2)
BASOPHILS NFR BLD AUTO: 0.3 % (ref 0–1)
DIFFERENTIAL METHOD BLD: ABNORMAL
EOSINOPHIL # BLD AUTO: 0.07 K/UL (ref 0–0.5)
EOSINOPHIL NFR BLD AUTO: 0.8 % (ref 1–4)
ERYTHROCYTE [DISTWIDTH] IN BLOOD BY AUTOMATED COUNT: 14.3 % (ref 11.5–14.5)
HCT VFR BLD AUTO: 37.1 % (ref 38–47)
HGB BLD-MCNC: 11.7 G/DL (ref 12–16)
IMM GRANULOCYTES # BLD AUTO: 0.05 K/UL (ref 0–0.04)
IMM GRANULOCYTES NFR BLD: 0.5 % (ref 0–0.4)
LYMPHOCYTES # BLD AUTO: 2.98 K/UL (ref 1–4.8)
LYMPHOCYTES NFR BLD AUTO: 32.2 % (ref 27–41)
MCH RBC QN AUTO: 28.2 PG (ref 27–31)
MCHC RBC AUTO-ENTMCNC: 31.5 G/DL (ref 32–36)
MCV RBC AUTO: 89.4 FL (ref 80–96)
MONOCYTES # BLD AUTO: 0.74 K/UL (ref 0–0.8)
MONOCYTES NFR BLD AUTO: 8 % (ref 2–6)
MPC BLD CALC-MCNC: 11 FL (ref 9.4–12.4)
NEUTROPHILS # BLD AUTO: 5.39 K/UL (ref 1.8–7.7)
NEUTROPHILS NFR BLD AUTO: 58.2 % (ref 53–65)
NRBC # BLD AUTO: 0 X10E3/UL
NRBC, AUTO (.00): 0 %
PLATELET # BLD AUTO: 296 K/UL (ref 150–400)
RBC # BLD AUTO: 4.15 M/UL (ref 4.2–5.4)
WBC # BLD AUTO: 9.26 K/UL (ref 4.5–11)

## 2024-10-24 PROCEDURE — 85025 COMPLETE CBC W/AUTO DIFF WBC: CPT | Performed by: STUDENT IN AN ORGANIZED HEALTH CARE EDUCATION/TRAINING PROGRAM

## 2024-10-24 PROCEDURE — 25000003 PHARM REV CODE 250: Performed by: STUDENT IN AN ORGANIZED HEALTH CARE EDUCATION/TRAINING PROGRAM

## 2024-10-24 PROCEDURE — 0503F POSTPARTUM CARE VISIT: CPT | Mod: ,,, | Performed by: STUDENT IN AN ORGANIZED HEALTH CARE EDUCATION/TRAINING PROGRAM

## 2024-10-24 PROCEDURE — 11000001 HC ACUTE MED/SURG PRIVATE ROOM

## 2024-10-24 PROCEDURE — 36415 COLL VENOUS BLD VENIPUNCTURE: CPT | Performed by: STUDENT IN AN ORGANIZED HEALTH CARE EDUCATION/TRAINING PROGRAM

## 2024-10-24 RX ORDER — IBUPROFEN 800 MG/1
800 TABLET ORAL EVERY 8 HOURS PRN
Qty: 90 TABLET | Refills: 0 | Status: SHIPPED | OUTPATIENT
Start: 2024-10-24

## 2024-10-24 RX ORDER — HYDROCODONE BITARTRATE AND ACETAMINOPHEN 5; 325 MG/1; MG/1
1 TABLET ORAL EVERY 6 HOURS PRN
Qty: 5 TABLET | Refills: 0 | Status: SHIPPED | OUTPATIENT
Start: 2024-10-24

## 2024-10-24 RX ADMIN — IBUPROFEN 800 MG: 800 TABLET, FILM COATED ORAL at 01:10

## 2024-10-24 RX ADMIN — IBUPROFEN 800 MG: 800 TABLET, FILM COATED ORAL at 05:10

## 2024-10-24 NOTE — HOSPITAL COURSE
29 y.o.  at 39w0d presented for scheduled IOL. She ultimately delivered via . See delivery note for details.    Postpartum course was uncomplicated. Hgb stable. She met all postpartum milestones and was discharged on PPD2. Follow up outpatient with Dr. Beltran.

## 2024-10-24 NOTE — SUBJECTIVE & OBJECTIVE
Interval History: PPD1 s/p     She is doing well this morning. She is tolerating a regular diet without nausea or vomiting. She is voiding spontaneously. She is ambulating. She has passed flatus, and has not a BM. Vaginal bleeding is mild. She denies fever or chills. Abdominal pain is mild and controlled with oral medications. She Is breastfeeding. She desires circumcision for her male baby: not applicable.    Objective:     Vital Signs (Most Recent):  Temp: 97 °F (36.1 °C) (10/23/24 2021)  Pulse: 69 (10/24/24 0455)  Resp: 18 (10/23/24 2021)  BP: 130/81 (10/24/24 0455)  SpO2: 98 % (10/23/24 2021) Vital Signs (24h Range):  Temp:  [97 °F (36.1 °C)-97.3 °F (36.3 °C)] 97 °F (36.1 °C)  Pulse:  [60-83] 69  Resp:  [18-20] 18  SpO2:  [95 %-100 %] 98 %  BP: ()/(51-81) 130/81     Weight: 84.1 kg (185 lb 6.4 oz)  Body mass index is 31.82 kg/m².      Intake/Output Summary (Last 24 hours) at 10/24/2024 0858  Last data filed at 10/23/2024 2323  Gross per 24 hour   Intake 1803.12 ml   Output 1002 ml   Net 801.12 ml         Significant Labs:  Lab Results   Component Value Date    GROUPTRH A POS 10/23/2024    HEPBSAG Non-Reactive 10/23/2024    AFP 25.1 2022     Recent Labs   Lab 10/24/24  0453   HGB 11.7*   HCT 37.1*       CBC:   Recent Labs   Lab 10/24/24  0453   WBC 9.26   RBC 4.15*   HGB 11.7*   HCT 37.1*      MCV 89.4   MCH 28.2   MCHC 31.5*     I have personallly reviewed all pertinent lab results from the last 24 hours.  Recent Lab Results         10/24/24  0453   10/23/24  1551   10/23/24  1548        POC A-aDO2           Baso # 0.03           Basophil % 0.3           Differential Method Auto           Eos # 0.07           Eos % 0.8           Hematocrit 37.1           Hemoglobin 11.7           Immature Grans (Abs) 0.05           Immature Granulocytes 0.5           Lymph # 2.98           Lymph % 32.2           MCH 28.2           MCHC 31.5           MCV 89.4           Mono # 0.74           Mono % 8.0            MPV 11.0           Neutrophils, Abs 5.39           Neutrophils Relative 58.2           nRBC 0.0           NUCLEATED RBC ABSOLUTE 0.00           Platelet Count 296           POC Base Excess   -3.7   -2.5       POC HCO3   23.5   22.5       POC PCO2   50   39       POC PH   7.28   7.37       POC PO2   20   22       POC SATURATED O2   41.1   53.4       RBC 4.15           RDW 14.3           WBC 9.26                   Physical Exam:   Constitutional: She is oriented to person, place, and time. She appears well-developed and well-nourished.    HENT:   Head: Normocephalic and atraumatic.    Eyes: Pupils are equal, round, and reactive to light.     Cardiovascular:  Normal rate and regular rhythm.      Exam reveals no clubbing, no cyanosis and no edema.        Pulmonary/Chest: Effort normal.        Abdominal: Soft.     Genitourinary:    Uterus normal.             Musculoskeletal: Normal range of motion and moves all extremeties. No edema.       Neurological: She is alert and oriented to person, place, and time.    Skin: Skin is warm and dry. No cyanosis. Nails show no clubbing.    Psychiatric: She has a normal mood and affect. Her behavior is normal. Judgment and thought content normal.       Review of Systems   All other systems reviewed and are negative.

## 2024-10-24 NOTE — PROGRESS NOTES
[facility] -  Labor and Delivery  Obstetrics  Postpartum Progress Note    Patient Name: Lorenza Santiago  MRN: [id number]  Admission Date: 10/23/2024  Hospital Length of Stay: 1 days  Attending Physician: Onel Beltran DO  Primary Care Provider: Tiaan, Primary Doctor    Subjective:     Principal Problem:Encounter for elective induction of labor    Hospital Course:  No notes on file    Interval History: PPD1 s/p     She is doing well this morning. She is tolerating a regular diet without nausea or vomiting. She is voiding spontaneously. She is ambulating. She has passed flatus, and has not a BM. Vaginal bleeding is mild. She denies fever or chills. Abdominal pain is mild and controlled with oral medications. She Is breastfeeding. She desires circumcision for her male baby: not applicable.    Objective:     Vital Signs (Most Recent):  Temp: 97 °F (36.1 °C) (10/23/24 2021)  Pulse: 69 (10/24/24 0455)  Resp: 18 (10/23/24 2021)  BP: 130/81 (10/24/24 0455)  SpO2: 98 % (10/23/24 2021) Vital Signs (24h Range):  Temp:  [97 °F (36.1 °C)-97.3 °F (36.3 °C)] 97 °F (36.1 °C)  Pulse:  [60-83] 69  Resp:  [18-20] 18  SpO2:  [95 %-100 %] 98 %  BP: ()/(51-81) 130/81     Weight: 84.1 kg (185 lb 6.4 oz)  Body mass index is 31.82 kg/m².      Intake/Output Summary (Last 24 hours) at 10/24/2024 0858  Last data filed at 10/23/2024 2323  Gross per 24 hour   Intake 1803.12 ml   Output 1002 ml   Net 801.12 ml         Significant Labs:  Lab Results   Component Value Date    GROUPTRH A POS 10/23/2024    HEPBSAG Non-Reactive 10/23/2024    AFP 25.1 2022     Recent Labs   Lab 10/24/24  0453   HGB 11.7*   HCT 37.1*       CBC:   Recent Labs   Lab 10/24/24  0453   WBC 9.26   RBC 4.15*   HGB 11.7*   HCT 37.1*      MCV 89.4   MCH 28.2   MCHC 31.5*     I have personallly reviewed all pertinent lab results from the last 24 hours.  Recent Lab Results         10/24/24  0453   10/23/24  1551   10/23/24  1548        POC  A-aDO2           Baso # 0.03           Basophil % 0.3           Differential Method Auto           Eos # 0.07           Eos % 0.8           Hematocrit 37.1           Hemoglobin 11.7           Immature Grans (Abs) 0.05           Immature Granulocytes 0.5           Lymph # 2.98           Lymph % 32.2           MCH 28.2           MCHC 31.5           MCV 89.4           Mono # 0.74           Mono % 8.0           MPV 11.0           Neutrophils, Abs 5.39           Neutrophils Relative 58.2           nRBC 0.0           NUCLEATED RBC ABSOLUTE 0.00           Platelet Count 296           POC Base Excess   -3.7   -2.5       POC HCO3   23.5   22.5       POC PCO2   50   39       POC PH   7.28   7.37       POC PO2   20   22       POC SATURATED O2   41.1   53.4       RBC 4.15           RDW 14.3           WBC 9.26                   Physical Exam:   Constitutional: She is oriented to person, place, and time. She appears well-developed and well-nourished.    HENT:   Head: Normocephalic and atraumatic.    Eyes: Pupils are equal, round, and reactive to light.     Cardiovascular:  Normal rate and regular rhythm.      Exam reveals no clubbing, no cyanosis and no edema.        Pulmonary/Chest: Effort normal.        Abdominal: Soft.     Genitourinary:    Uterus normal.             Musculoskeletal: Normal range of motion and moves all extremeties. No edema.       Neurological: She is alert and oriented to person, place, and time.    Skin: Skin is warm and dry. No cyanosis. Nails show no clubbing.    Psychiatric: She has a normal mood and affect. Her behavior is normal. Judgment and thought content normal.       Review of Systems   All other systems reviewed and are negative.    Assessment/Plan:     29 y.o. female  for:    * Encounter for elective induction of labor  Cytotec IOL  Anticipate vaginal delivery  GBS neg    39 weeks gestation of pregnancy       History of vacuum extraction assisted delivery       Postpartum care following  vaginal delivery  Meeting all milestones  Breastfeeding well  Hgb stable        Disposition: As patient meets milestones, will plan to discharge PPD2.    Onel Jarvis, DO  Obstetrics  [facility] -  Labor and Delivery

## 2024-10-24 NOTE — DISCHARGE SUMMARY
[facility] -  Labor and Delivery  Obstetrics  Discharge Summary      Patient Name: Lorenza Santiago  MRN: [id number]  Admission Date: 10/23/2024  Hospital Length of Stay: 1 days  Discharge Date and Time: No discharge date for patient encounter.  Attending Physician: Ady Xavier DO   Discharging Provider: Ady Jarvis DO   Primary Care Provider: Tiana, Primary Doctor    HPI: 29 y.o.  at 39w0d presents to L&D for scheduled IOL. She denies vaginal bleeding, LOF, ctx. Reports good fetal movement. Prenatal care with Dr. Xavier. Pregnancy uncomplicated. A pos, GBS neg, rubella imm.            * No surgery found *     Hospital Course:   29 y.o.  at 39w0d presented for scheduled IOL. She ultimately delivered via . See delivery note for details.    Postpartum course was uncomplicated. Hgb stable. She met all postpartum milestones and was discharged on PPD2. Follow up outpatient with Dr. Xavier.         Consults (From admission, onward)          Status Ordering Provider     Inpatient consult to Lactation  Once        Provider:  (Not yet assigned)    Acknowledged ADY XAVIER            Final Active Diagnoses:    Diagnosis Date Noted POA    PRINCIPAL PROBLEM:  Encounter for elective induction of labor [Z34.90] 2022 Not Applicable    39 weeks gestation of pregnancy [Z3A.39] 2024 Not Applicable    History of vacuum extraction assisted delivery [Z87.59] 2024 Not Applicable    Postpartum care following vaginal delivery [Z39.2] 2022 Not Applicable      Problems Resolved During this Admission:        Significant Diagnostic Studies: Labs: CBC   Recent Labs   Lab 10/23/24  0054 10/24/24  0453   WBC 10.25 9.26   HGB 11.8* 11.7*   HCT 35.6* 37.1*    296    and All labs within the past 24 hours have been reviewed      Feeding Method: breast    Immunizations       Date Immunization Status Dose Route/Site Given by    10/23/24 113 MMR Incomplete 0.5 mL  "Subcutaneous/     10/23/24 1822 Tdap Incomplete 0.5 mL Intramuscular/             Delivery:    Episiotomy: None   Lacerations: 1st   Repair suture:     Repair # of packets: 2   Blood loss (ml):       Birth information:  YOB: 2022   Time of birth: 3:46 PM   Sex: female   Delivery type: Vaginal, Spontaneous   Gestational Age: 39w0d     Measurements    Weight: 2695 g  Weight (lbs): 5 lb 15.1 oz  Length: 48.3 cm  Length (in): 19"         Delivery Clinician: Delivery Providers    Delivering clinician: Onel Beltran DO          Additional  information:  Forceps:    Vacuum:    Breech:    Observed anomalies      Living?:     Apgars    Living status: Living  Apgar Component Scores:  1 min.:  5 min.:  10 min.:  15 min.:  20 min.:    Skin color:  0  1       Heart rate:  2  2       Reflex irritability:  2  2       Muscle tone:  2  2       Respiratory effort:  2  2       Total:  8  9       Apgars assigned by: MANI SHELLEY RN         Placenta: Delivered:       appearance  Pending Diagnostic Studies:       None            Discharged Condition: good    Disposition: Home or Self Care    Follow Up:   Follow-up Information       Onel Beltran DO Follow up in 3 week(s).    Specialty: Obstetrics and Gynecology  Why: postpartum  Contact information:  [address] [place] [address] [id number] [phone]                           Patient Instructions:      Pelvic Rest     Notify your health care provider if you experience any of the following:  temperature >100.4     Notify your health care provider if you experience any of the following:  persistent nausea and vomiting or diarrhea     Notify your health care provider if you experience any of the following:  severe uncontrolled pain     Notify your health care provider if you experience any of the following:  redness, tenderness, or signs of infection (pain, swelling, redness, odor or green/yellow discharge around incision site)     Notify your health care " provider if you experience any of the following:  difficulty breathing or increased cough     Notify your health care provider if you experience any of the following:  severe persistent headache     Notify your health care provider if you experience any of the following:  worsening rash     Notify your health care provider if you experience any of the following:  persistent dizziness, light-headedness, or visual disturbances     Notify your health care provider if you experience any of the following:  increased confusion or weakness     Medications:  Current Discharge Medication List        START taking these medications    Details   HYDROcodone-acetaminophen (NORCO) 5-325 mg per tablet Take 1 tablet by mouth every 6 (six) hours as needed.  Qty: 5 tablet, Refills: 0      ibuprofen (ADVIL,MOTRIN) 800 MG tablet Take 1 tablet (800 mg total) by mouth every 8 (eight) hours as needed (cramping).  Qty: 90 tablet, Refills: 0           CONTINUE these medications which have NOT CHANGED    Details   famotidine (PEPCID) 20 MG tablet Take 20 mg by mouth 2 (two) times daily.      ondansetron (ZOFRAN-ODT) 4 MG TbDL Take 1 tablet (4 mg total) by mouth every 6 (six) hours as needed (nausea).  Qty: 30 tablet, Refills: 2      prenatal vit no.124/iron/folic (PRENATAL VITAMIN ORAL) Take 1 capsule by mouth once daily at 6am.             Onel Jarvis, DO  Obstetrics  [facility] -  Labor and Delivery

## 2024-10-25 VITALS
WEIGHT: 185.38 LBS | TEMPERATURE: 98 F | HEART RATE: 58 BPM | DIASTOLIC BLOOD PRESSURE: 79 MMHG | SYSTOLIC BLOOD PRESSURE: 117 MMHG | RESPIRATION RATE: 20 BRPM | OXYGEN SATURATION: 100 % | BODY MASS INDEX: 31.65 KG/M2 | HEIGHT: 64 IN

## 2024-10-25 PROCEDURE — 25000003 PHARM REV CODE 250: Performed by: STUDENT IN AN ORGANIZED HEALTH CARE EDUCATION/TRAINING PROGRAM

## 2024-10-25 RX ADMIN — Medication 1 TABLET: at 08:10

## 2024-10-25 NOTE — DISCHARGE INSTRUCTIONS
Topic Nurse Date Time Comments   PP Education Booklet Given TH   10/25/2024 0902    Skin to skin TH 10/25/2024 0902    Rooming in TH 10/25/2024 0902    Importance of Exclusive Breastfeeding for 6 mo TH 10/25/2024 0902    Bleeding TH 10/25/2024 0902    Incision Care  10/25/2024 0902    Sex TH 10/25/2024 0902    Pain Management TH 10/25/2024 0902    Perineal Care TH 10/25/2024 0902    Minimizing Engorgement TH 10/25/2024 0902    Collection & Storage of BM TH 10/25/2024 0902    S/S of BF Problems TH 10/25/2024 0902    Hand Expression TH 10/25/2024 0902    Maternal s/s breast problems TH 10/25/2024 0902    Mgnt of Common BF Problems (engorgement, sore & cracked nipples) TH 10/25/2024 0902    PPD/Anxiety TH 10/25/2024 0902

## 2024-10-29 ENCOUNTER — PATIENT MESSAGE (OUTPATIENT)
Dept: OBSTETRICS AND GYNECOLOGY | Facility: CLINIC | Age: 29
End: 2024-10-29
Payer: COMMERCIAL

## 2024-10-31 ENCOUNTER — PATIENT MESSAGE (OUTPATIENT)
Dept: OBSTETRICS AND GYNECOLOGY | Facility: CLINIC | Age: 29
End: 2024-10-31
Payer: COMMERCIAL

## 2024-10-31 RX ORDER — NITROFURANTOIN 25; 75 MG/1; MG/1
100 CAPSULE ORAL 2 TIMES DAILY
Qty: 14 CAPSULE | Refills: 0 | Status: SHIPPED | OUTPATIENT
Start: 2024-10-31 | End: 2024-11-07

## 2024-11-13 ENCOUNTER — PATIENT MESSAGE (OUTPATIENT)
Dept: OBSTETRICS AND GYNECOLOGY | Facility: CLINIC | Age: 29
End: 2024-11-13
Payer: COMMERCIAL

## 2024-11-18 ENCOUNTER — POSTPARTUM VISIT (OUTPATIENT)
Dept: OBSTETRICS AND GYNECOLOGY | Facility: CLINIC | Age: 29
End: 2024-11-18
Payer: COMMERCIAL

## 2024-11-18 VITALS
RESPIRATION RATE: 16 BRPM | DIASTOLIC BLOOD PRESSURE: 73 MMHG | HEART RATE: 60 BPM | BODY MASS INDEX: 28.51 KG/M2 | HEIGHT: 64 IN | SYSTOLIC BLOOD PRESSURE: 113 MMHG | WEIGHT: 167 LBS

## 2024-11-18 DIAGNOSIS — Z30.430 ENCOUNTER FOR INSERTION OF MIRENA IUD: ICD-10-CM

## 2024-11-18 PROCEDURE — 0503F POSTPARTUM CARE VISIT: CPT | Mod: ,,, | Performed by: STUDENT IN AN ORGANIZED HEALTH CARE EDUCATION/TRAINING PROGRAM

## 2024-11-18 PROCEDURE — 99213 OFFICE O/P EST LOW 20 MIN: CPT | Mod: PBBFAC | Performed by: STUDENT IN AN ORGANIZED HEALTH CARE EDUCATION/TRAINING PROGRAM

## 2024-11-18 PROCEDURE — 99999 PR PBB SHADOW E&M-EST. PATIENT-LVL III: CPT | Mod: PBBFAC,,, | Performed by: STUDENT IN AN ORGANIZED HEALTH CARE EDUCATION/TRAINING PROGRAM

## 2024-11-18 RX ORDER — METRONIDAZOLE 500 MG/1
500 TABLET ORAL EVERY 12 HOURS
Qty: 14 TABLET | Refills: 0 | Status: SHIPPED | OUTPATIENT
Start: 2024-11-18 | End: 2024-11-25

## 2024-11-18 NOTE — PROGRESS NOTES
Subjective:      Lorenza Santiago is a 29 y.o. female who presents for a postpartum visit. She is 3 weeks postpartum.  Outcome: spontaneous vaginal delivery. Anesthesia: epidural. Postpartum course has been uncomplicated. Baby's course has been uncomplicated. Baby is feeding by breast. Bleeding thin lochia. Bowel function is normal. Bladder function is normal. Patient is not sexually active. Contraception method is abstinence. Postpartum depression screening: negative.    The following portions of the patient's history were reviewed and updated as appropriate: allergies, current medications, past family history, past medical history, past social history, past surgical history, and problem list.    Review of Systems  Pertinent items are noted in HPI.     Objective:     There were no vitals filed for this visit.    General:  alert, appears stated age, and cooperative    Breasts:  deferred   Lungs: Resp even & unlabored   Abdomen: normal findings: soft, non-tender          Assessment:      3 week postpartum exam.     Plan:      1. Contraception: abstinence  2.  Follow up in: 3 weeks or as needed.     Alyse H. Boswell, DO FACOOG [facility]

## 2024-12-09 ENCOUNTER — POSTPARTUM VISIT (OUTPATIENT)
Dept: OBSTETRICS AND GYNECOLOGY | Facility: CLINIC | Age: 29
End: 2024-12-09
Payer: COMMERCIAL

## 2024-12-09 VITALS
HEIGHT: 64 IN | RESPIRATION RATE: 18 BRPM | WEIGHT: 170.63 LBS | DIASTOLIC BLOOD PRESSURE: 72 MMHG | TEMPERATURE: 98 F | OXYGEN SATURATION: 98 % | SYSTOLIC BLOOD PRESSURE: 111 MMHG | HEART RATE: 74 BPM | BODY MASS INDEX: 29.13 KG/M2

## 2024-12-09 DIAGNOSIS — Z30.430 ENCOUNTER FOR IUD INSERTION: ICD-10-CM

## 2024-12-09 PROCEDURE — 0503F POSTPARTUM CARE VISIT: CPT | Mod: ,,, | Performed by: STUDENT IN AN ORGANIZED HEALTH CARE EDUCATION/TRAINING PROGRAM

## 2024-12-09 PROCEDURE — 99999 PR PBB SHADOW E&M-EST. PATIENT-LVL IV: CPT | Mod: PBBFAC,,, | Performed by: STUDENT IN AN ORGANIZED HEALTH CARE EDUCATION/TRAINING PROGRAM

## 2024-12-09 PROCEDURE — 99214 OFFICE O/P EST MOD 30 MIN: CPT | Mod: PBBFAC | Performed by: STUDENT IN AN ORGANIZED HEALTH CARE EDUCATION/TRAINING PROGRAM

## 2024-12-09 RX ORDER — MISOPROSTOL 200 UG/1
200 TABLET ORAL ONCE
Qty: 1 TABLET | Refills: 0 | Status: SHIPPED | OUTPATIENT
Start: 2024-12-09 | End: 2024-12-09

## 2024-12-11 NOTE — PROGRESS NOTES
Subjective:      Lorenza Santiago is a 29 y.o. female who presents for a postpartum visit. She is 6 weeks postpartum. The delivery was at 39 gestational weeks. Outcome: spontaneous vaginal delivery. Anesthesia: epidural. Postpartum course has been uncomplicated. Baby's course has been uncomplicated. Baby is feeding by breast. Bleeding no bleeding. Bowel function is normal. Bladder function is normal. Patient is not sexually active. Contraception method is abstinence. Postpartum depression screening: negative.    The following portions of the patient's history were reviewed and updated as appropriate: allergies, current medications, past family history, past medical history, past social history, past surgical history, and problem list.    Review of Systems  Pertinent items are noted in HPI.     Objective:     Vitals:    12/09/24 1230   BP: 111/72   Pulse: 74   Resp: 18   Temp: 98.3 °F (36.8 °C)       General:  alert, appears stated age, and cooperative    Breasts:  deferred   Lungs: Unlabored respirations   Abdomen: Soft, nontender          Assessment:      Normal postpartum exam.     Plan:      1. Contraception: abstinence  2.  Follow up in 2 weeks for Mirena IUD insertion

## 2024-12-30 ENCOUNTER — PROCEDURE VISIT (OUTPATIENT)
Dept: OBSTETRICS AND GYNECOLOGY | Facility: CLINIC | Age: 29
End: 2024-12-30
Payer: COMMERCIAL

## 2024-12-30 VITALS
HEART RATE: 75 BPM | DIASTOLIC BLOOD PRESSURE: 80 MMHG | WEIGHT: 172 LBS | SYSTOLIC BLOOD PRESSURE: 141 MMHG | BODY MASS INDEX: 29.52 KG/M2

## 2024-12-30 DIAGNOSIS — Z30.430 ENCOUNTER FOR IUD INSERTION: ICD-10-CM

## 2024-12-30 PROCEDURE — 87591 N.GONORRHOEAE DNA AMP PROB: CPT | Mod: ,,, | Performed by: CLINICAL MEDICAL LABORATORY

## 2024-12-30 PROCEDURE — 99499 UNLISTED E&M SERVICE: CPT | Mod: S$PBB,,, | Performed by: STUDENT IN AN ORGANIZED HEALTH CARE EDUCATION/TRAINING PROGRAM

## 2024-12-30 PROCEDURE — 58300 INSERT INTRAUTERINE DEVICE: CPT | Mod: PBBFAC | Performed by: STUDENT IN AN ORGANIZED HEALTH CARE EDUCATION/TRAINING PROGRAM

## 2024-12-30 PROCEDURE — 99999PBSHW PR PBB SHADOW TECHNICAL ONLY FILED TO HB: Mod: PBBFAC,,,

## 2024-12-30 PROCEDURE — 87491 CHLMYD TRACH DNA AMP PROBE: CPT | Mod: ,,, | Performed by: CLINICAL MEDICAL LABORATORY

## 2024-12-30 RX ADMIN — LEVONORGESTREL 1 INTRA UTERINE DEVICE: 52 INTRAUTERINE DEVICE INTRAUTERINE at 03:12

## 2024-12-31 LAB
CHLAMYDIA BY PCR: NEGATIVE
N. GONORRHOEAE (GC) BY PCR: NEGATIVE

## 2024-12-31 NOTE — PROCEDURES
Insertion of IUD    Date/Time: 12/30/2024 3:00 PM    Performed by: Onel Beltran DO  Authorized by: Onel Beltran DO    Consent:     Consent obtained:  Prior to procedure the appropriate consent was completed and verified    Consent given by:  Patient    Procedure risks and benefits discussed: yes      Patient questions answered: yes      Patient agrees, verbalizes understanding, and wants to proceed: yes     Device to be inserted was verified by patient: yes  Insertion Procedure:   1 Intra Uterine Device levonorgestreL 52 mg       Pelvic exam performed: yes      Negative urine pregnancy test: yes      Cervix cleaned and prepped: yes      Speculum placed in vagina: yes      Tenaculum applied to cervix: yes      Uterus sounded: yes      Uterus sound depth (cm):  6    IUD inserted with no complications: yes      IUD type:  Mirena    Strings trimmed: yes    Post-procedure:     Patient tolerated procedure well: yes    Comments:      Return in 4 weeks for string check.

## 2025-02-17 ENCOUNTER — OFFICE VISIT (OUTPATIENT)
Dept: OBSTETRICS AND GYNECOLOGY | Facility: CLINIC | Age: 30
End: 2025-02-17
Payer: COMMERCIAL

## 2025-02-17 VITALS
DIASTOLIC BLOOD PRESSURE: 80 MMHG | BODY MASS INDEX: 31.24 KG/M2 | HEART RATE: 89 BPM | OXYGEN SATURATION: 100 % | WEIGHT: 182 LBS | SYSTOLIC BLOOD PRESSURE: 129 MMHG

## 2025-02-17 DIAGNOSIS — Z30.431 IUD CHECK UP: Primary | ICD-10-CM

## 2025-02-17 PROCEDURE — 99213 OFFICE O/P EST LOW 20 MIN: CPT | Mod: S$PBB,,, | Performed by: STUDENT IN AN ORGANIZED HEALTH CARE EDUCATION/TRAINING PROGRAM

## 2025-02-17 PROCEDURE — 3074F SYST BP LT 130 MM HG: CPT | Mod: ,,, | Performed by: STUDENT IN AN ORGANIZED HEALTH CARE EDUCATION/TRAINING PROGRAM

## 2025-02-17 PROCEDURE — 99999 PR PBB SHADOW E&M-EST. PATIENT-LVL III: CPT | Mod: PBBFAC,,, | Performed by: STUDENT IN AN ORGANIZED HEALTH CARE EDUCATION/TRAINING PROGRAM

## 2025-02-17 PROCEDURE — 3008F BODY MASS INDEX DOCD: CPT | Mod: ,,, | Performed by: STUDENT IN AN ORGANIZED HEALTH CARE EDUCATION/TRAINING PROGRAM

## 2025-02-17 PROCEDURE — 3079F DIAST BP 80-89 MM HG: CPT | Mod: ,,, | Performed by: STUDENT IN AN ORGANIZED HEALTH CARE EDUCATION/TRAINING PROGRAM

## 2025-02-17 PROCEDURE — 99213 OFFICE O/P EST LOW 20 MIN: CPT | Mod: PBBFAC | Performed by: STUDENT IN AN ORGANIZED HEALTH CARE EDUCATION/TRAINING PROGRAM

## 2025-02-17 NOTE — PROGRESS NOTES
Return Gyn Office Visit    Assessment/Plan  Problem List Items Addressed This Visit    None  Visit Diagnoses         IUD check up    -  Primary              CC:   Chief Complaint   Patient presents with    Follow-up     4 week follow up. Iud check       HPI:  29 y.o. who presents to office for IUD string check. Denies any problems or concerns.    Review of Systems - The following ROS was otherwise negative, except as noted in the HPI:  constitutional, respiratory, cardiovascular, gastrointestinal, genitourinary    Objective:  /80 (BP Location: Right arm, Patient Position: Sitting)   Pulse 89   Wt 82.6 kg (182 lb)   LMP  (LMP Unknown)   SpO2 100%   BMI 31.24 kg/m²   General: Alert, well appearing, no acute distress  Abdomen: Soft, nontender, nondistended   Pelvic:  External genitalia without masses or lesions.  Moist, pink vagina with physiologic discharge.  Cervix without polyps or masses, IUD strings visualized.    Extremities: No redness or tenderness, neg Nereida's sign  Osteopathic: no TART changes

## 2025-03-26 ENCOUNTER — OFFICE VISIT (OUTPATIENT)
Dept: NEUROLOGY | Facility: CLINIC | Age: 30
End: 2025-03-26
Payer: COMMERCIAL

## 2025-03-26 ENCOUNTER — RESULTS FOLLOW-UP (OUTPATIENT)
Dept: NEUROLOGY | Facility: CLINIC | Age: 30
End: 2025-03-26
Payer: COMMERCIAL

## 2025-03-26 VITALS
DIASTOLIC BLOOD PRESSURE: 86 MMHG | OXYGEN SATURATION: 98 % | BODY MASS INDEX: 31.58 KG/M2 | SYSTOLIC BLOOD PRESSURE: 128 MMHG | HEART RATE: 72 BPM | HEIGHT: 64 IN | WEIGHT: 185 LBS

## 2025-03-26 DIAGNOSIS — R51.9 FREQUENT HEADACHES: Primary | ICD-10-CM

## 2025-03-26 DIAGNOSIS — E55.9 VITAMIN D DEFICIENCY: ICD-10-CM

## 2025-03-26 DIAGNOSIS — E53.8 VITAMIN B12 DEFICIENCY: ICD-10-CM

## 2025-03-26 DIAGNOSIS — E03.9 HYPOTHYROIDISM, UNSPECIFIED TYPE: ICD-10-CM

## 2025-03-26 PROCEDURE — 1159F MED LIST DOCD IN RCRD: CPT | Mod: ,,, | Performed by: NURSE PRACTITIONER

## 2025-03-26 PROCEDURE — 99214 OFFICE O/P EST MOD 30 MIN: CPT | Mod: PBBFAC | Performed by: NURSE PRACTITIONER

## 2025-03-26 PROCEDURE — 3079F DIAST BP 80-89 MM HG: CPT | Mod: ,,, | Performed by: NURSE PRACTITIONER

## 2025-03-26 PROCEDURE — 99203 OFFICE O/P NEW LOW 30 MIN: CPT | Mod: S$PBB,,, | Performed by: NURSE PRACTITIONER

## 2025-03-26 PROCEDURE — 1160F RVW MEDS BY RX/DR IN RCRD: CPT | Mod: ,,, | Performed by: NURSE PRACTITIONER

## 2025-03-26 PROCEDURE — 3008F BODY MASS INDEX DOCD: CPT | Mod: ,,, | Performed by: NURSE PRACTITIONER

## 2025-03-26 PROCEDURE — 99999 PR PBB SHADOW E&M-EST. PATIENT-LVL IV: CPT | Mod: PBBFAC,,, | Performed by: NURSE PRACTITIONER

## 2025-03-26 PROCEDURE — 3074F SYST BP LT 130 MM HG: CPT | Mod: ,,, | Performed by: NURSE PRACTITIONER

## 2025-03-26 NOTE — PROGRESS NOTES
Subjective:       Patient ID: Lorenza Santiago is a 29 y.o. female     Chief Complaint:    Chief Complaint   Patient presents with    Migraine        Allergies:  Patient has no known allergies.    Current Medications:  Encounter Medications[1]    History of Present Illness  28 y/o female new patient to neurology for reported migraine headaches    She has had complications with headaches since her teen years, however they were infrequent.  Over the last few years more frequetn, though did not have complications with migraines during both of her pregnancies.  Over the last year she has been reporting frequency of headaches as near daily, but at least 20 days of the month.  Commonly posterior, rated 4/10, with photophobia and phonophobia and nausea and vomiting     She denies pulsatile tinnitus but she does report her optometry has reported swelling of the vessels her eyes in the past so certainly concern for BIH, and given the frequency of headaches needs to rule out empty sella as well as chiari malformation.      Currently breast feeding so medications out except propranolol.  However there is reported daily use of tylenol, so rebound headaches are a concern, need to see how much improves with holding of the OTC medications.  She will let me know in 2 weeks how this is doing, if not better than will do propranolol           Review of Systems  Review of Systems   Constitutional:  Negative for diaphoresis and fever.   HENT:  Negative for congestion, hearing loss and tinnitus.    Eyes:  Positive for blurred vision and photophobia. Negative for double vision, discharge and redness.   Respiratory:  Negative for cough and shortness of breath.    Cardiovascular:  Negative for chest pain.   Gastrointestinal:  Negative for abdominal pain, nausea and vomiting.   Musculoskeletal:  Negative for back pain, joint pain, myalgias and neck pain.   Skin:  Negative for itching and rash.   Neurological:  Positive for headaches.  Negative for dizziness, tremors, sensory change, speech change, focal weakness, seizures, loss of consciousness and weakness.   Psychiatric/Behavioral:  Negative for depression, hallucinations and memory loss. The patient does not have insomnia.    All other systems reviewed and are negative.     Objective:     NEUROLOGICAL EXAMINATION:     MENTAL STATUS   Oriented to person, place, and time.   Attention: normal. Concentration: normal.   Speech: speech is normal   Level of consciousness: alert  Knowledge: good and consistent with education.   Normal comprehension.     CRANIAL NERVES     CN II   Visual fields full to confrontation.   Visual acuity: normal  Right visual field deficit: none  Left visual field deficit: none     CN III, IV, VI   Pupils are equal, round, and reactive to light.  Extraocular motions are normal.   Right pupil: Size: 3 mm. Shape: regular. Reactivity: brisk. Consensual response: intact. Accommodation: intact.   Left pupil: Size: 3 mm. Shape: regular. Reactivity: brisk. Consensual response: intact. Accommodation: intact.   CN III: no CN III palsy  CN VI: no CN VI palsy  Nystagmus: none   Diplopia: none  Upgaze: normal  Downgaze: normal  Conjugate gaze: present  Vestibulo-ocular reflex: present    CN V   Facial sensation intact.   Right facial sensation deficit: none  Left facial sensation deficit: none  Right corneal reflex: normal  Left corneal reflex: normal    CN VII   Facial expression full, symmetric.   Right facial weakness: none  Left facial weakness: none  Right taste: normal  Left taste: normal    CN VIII   CN VIII normal.   Hearing: intact    CN IX, X   CN IX normal.   CN X normal.   Palate: symmetric    CN XI   CN XI normal.   Right sternocleidomastoid strength: normal  Left sternocleidomastoid strength: normal  Right trapezius strength: normal  Left trapezius strength: normal    CN XII   CN XII normal.   Tongue: not atrophic  Fasciculations: absent  Tongue deviation: none    MOTOR  EXAM   Muscle bulk: normal  Overall muscle tone: normal  Right arm tone: normal  Left arm tone: normal  Right arm pronator drift: absent  Left arm pronator drift: absent  Right leg tone: normal  Left leg tone: normal    Strength   Right neck flexion: 5/5  Left neck flexion: 5/5  Right neck extension: 5/5  Left neck extension: /5  Right deltoid: 5/5  Left deltoid: 5/5  Right biceps:   Left biceps:   Right triceps:   Left triceps: /  Right wrist flexion:   Left wrist flexion: 5/5  Right wrist extension: 5/5  Left wrist extension:   Right interossei:   Left interossei:   Right iliopsoas:   Left iliopsoas:   Right quadriceps:   Left quadriceps:   Right hamstrin/5  Left hamstrin/5  Right anterior tibial:   Left anterior tibial:   Right posterior tibial:   Left posterior tibial:   Right gastroc:   Left gastroc:     REFLEXES     Reflexes   Right brachioradialis: 2+  Left brachioradialis: 2+  Right biceps: 2+  Left biceps: 2+  Right triceps: 2+  Left triceps: 2+  Right patellar: 2+  Left patellar: 2+  Right achilles: 2+  Left achilles: 2+  Right plantar: normal  Left plantar: normal  Right Yang: absent  Left Yang: absent  Right ankle clonus: absent  Left ankle clonus: absent  Right pendular knee jerk: absent  Left pendular knee jerk: absent    SENSORY EXAM   Light touch normal.   Right arm light touch: normal  Left arm light touch: normal  Right leg light touch: normal  Left leg light touch: normal  Vibration normal.   Right arm vibration: normal  Left arm vibration: normal  Right leg vibration: normal  Left leg vibration: normal  Proprioception normal.   Right arm proprioception: normal  Left arm proprioception: normal  Right leg proprioception: normal  Left leg proprioception: normal  Pinprick normal.   Right arm pinprick: normal  Left arm pinprick: normal  Right leg pinprick: normal  Left leg pinprick: normal  Graphesthesia: normal  Romberg:  negative  Stereognosis: normal    GAIT AND COORDINATION     Gait  Gait: normal     Coordination   Finger to nose coordination: normal  Heel to shin coordination: normal  Tandem walking coordination: normal    Tremor   Resting tremor: absent  Intention tremor: absent  Action tremor: absent       Physical Exam  Vitals and nursing note reviewed.   Constitutional:       Appearance: Normal appearance.   HENT:      Head: Normocephalic.   Eyes:      Extraocular Movements: Extraocular movements intact and EOM normal.      Pupils: Pupils are equal, round, and reactive to light.   Pulmonary:      Effort: Pulmonary effort is normal.   Musculoskeletal:         General: No swelling or tenderness. Normal range of motion.      Cervical back: Normal range of motion and neck supple.      Right lower leg: No edema.      Left lower leg: No edema.   Skin:     General: Skin is warm and dry.      Coloration: Skin is not jaundiced.      Findings: No rash.   Neurological:      General: No focal deficit present.      Mental Status: She is alert and oriented to person, place, and time.      GCS: GCS eye subscore is 4. GCS verbal subscore is 5. GCS motor subscore is 6.      Cranial Nerves: No cranial nerve deficit.      Sensory: No sensory deficit.      Motor: Motor function is intact. No weakness.      Coordination: Coordination is intact. Coordination normal. Finger-Nose-Finger Test, Heel to Shin Test and Romberg Test normal.      Gait: Gait is intact. Gait and tandem walk normal.      Deep Tendon Reflexes: Reflexes normal.      Reflex Scores:       Tricep reflexes are 2+ on the right side and 2+ on the left side.       Bicep reflexes are 2+ on the right side and 2+ on the left side.       Brachioradialis reflexes are 2+ on the right side and 2+ on the left side.       Patellar reflexes are 2+ on the right side and 2+ on the left side.       Achilles reflexes are 2+ on the right side and 2+ on the left side.  Psychiatric:         Mood and  Affect: Mood normal.         Speech: Speech normal.         Behavior: Behavior normal.          Assessment:     Problem List Items Addressed This Visit    None  Visit Diagnoses         Frequent headaches    -  Primary    Relevant Orders    MRI Brain W WO Contrast    Basic Metabolic Panel    CBC Auto Differential      Vitamin B12 deficiency        Relevant Orders    Folate    Vitamin B12      Vitamin D deficiency        Relevant Orders    Vitamin D      Hypothyroidism, unspecified type        Relevant Orders    TSH             Primary Diagnosis and ICD10  Frequent headaches [R51.9]    Plan:     Patient Instructions   MRI brain  Labs as ordered  Release for latest optometry evaluation  Refrain from using any as needed medication for headaches more than 3 days of the week  Follow-up 2 months    Medications Discontinued During This Encounter   Medication Reason    HYDROcodone-acetaminophen (NORCO) 5-325 mg per tablet     miSOPROStoL (CYTOTEC) 200 MCG Tab     prenatal vit no.124/iron/folic (PRENATAL VITAMIN ORAL)        Requested Prescriptions      No prescriptions requested or ordered in this encounter       Orders Placed This Encounter   Procedures    MRI Brain W WO Contrast    Basic Metabolic Panel    CBC Auto Differential    Folate    Vitamin D    Vitamin B12    TSH            [1]   Outpatient Encounter Medications as of 3/26/2025   Medication Sig Dispense Refill    famotidine (PEPCID) 20 MG tablet Take 20 mg by mouth 2 (two) times daily.      ibuprofen (ADVIL,MOTRIN) 800 MG tablet Take 1 tablet (800 mg total) by mouth every 8 (eight) hours with food  as needed (cramping). 90 tablet 0    ondansetron (ZOFRAN-ODT) 4 MG TbDL Take 1 tablet (4 mg total) by mouth every 6 (six) hours as needed (nausea). 30 tablet 2    [DISCONTINUED] HYDROcodone-acetaminophen (NORCO) 5-325 mg per tablet Take 1 tablet by mouth every 6 (six) hours as needed for pain (Patient not taking: Reported on 12/9/2024) 5 tablet 0    [DISCONTINUED]  miSOPROStoL (CYTOTEC) 200 MCG Tab Take 1 tablet (200 mcg total) by mouth once. Night before IUD insertion. for 1 dose 1 tablet 0    [DISCONTINUED] prenatal vit no.124/iron/folic (PRENATAL VITAMIN ORAL) Take 1 capsule by mouth once daily at 6am. (Patient not taking: Reported on 3/26/2025)       Facility-Administered Encounter Medications as of 3/26/2025   Medication Dose Route Frequency Provider Last Rate Last Admin    levonorgestreL (Mirena) 52 mg IUD 1 Intra Uterine Device  1 Intra Uterine Device Intrauterine  Onel Beltran DO   1 Intra Uterine Device at 12/30/24 1500

## 2025-03-26 NOTE — PATIENT INSTRUCTIONS
MRI brain  Labs as ordered  Release for latest optometry evaluation  Refrain from using any as needed medication for headaches more than 3 days of the week  Follow-up 2 months

## 2025-03-27 ENCOUNTER — TELEPHONE (OUTPATIENT)
Dept: NEUROLOGY | Facility: CLINIC | Age: 30
End: 2025-03-27
Payer: COMMERCIAL

## 2025-03-27 NOTE — TELEPHONE ENCOUNTER
Called pt and gave her the information below from GLORIA Garcia NP. She v/u.      ----- Message from ANIRUDH Junior sent at 3/26/2025  4:10 PM CDT -----  Vit D slightly low, recommend taking daily over the counter 2000u vitamin D supplement  ----- Message -----  From: Lab, Background User  Sent: 3/26/2025   2:55 PM CDT  To: ANIRUDH Chavez

## 2025-05-15 ENCOUNTER — PATIENT MESSAGE (OUTPATIENT)
Dept: NEUROLOGY | Facility: CLINIC | Age: 30
End: 2025-05-15
Payer: COMMERCIAL

## 2025-08-21 ENCOUNTER — OFFICE VISIT (OUTPATIENT)
Dept: OBSTETRICS AND GYNECOLOGY | Facility: CLINIC | Age: 30
End: 2025-08-21
Payer: COMMERCIAL

## 2025-08-21 VITALS
WEIGHT: 177 LBS | BODY MASS INDEX: 30.38 KG/M2 | SYSTOLIC BLOOD PRESSURE: 112 MMHG | HEART RATE: 70 BPM | DIASTOLIC BLOOD PRESSURE: 70 MMHG

## 2025-08-21 DIAGNOSIS — Z01.419 ENCOUNTER FOR WELL WOMAN EXAM WITH ROUTINE GYNECOLOGICAL EXAM: Primary | ICD-10-CM

## 2025-08-21 PROCEDURE — 3008F BODY MASS INDEX DOCD: CPT | Mod: ,,, | Performed by: STUDENT IN AN ORGANIZED HEALTH CARE EDUCATION/TRAINING PROGRAM

## 2025-08-21 PROCEDURE — 3074F SYST BP LT 130 MM HG: CPT | Mod: ,,, | Performed by: STUDENT IN AN ORGANIZED HEALTH CARE EDUCATION/TRAINING PROGRAM

## 2025-08-21 PROCEDURE — 1159F MED LIST DOCD IN RCRD: CPT | Mod: ,,, | Performed by: STUDENT IN AN ORGANIZED HEALTH CARE EDUCATION/TRAINING PROGRAM

## 2025-08-21 PROCEDURE — 99213 OFFICE O/P EST LOW 20 MIN: CPT | Mod: PBBFAC | Performed by: STUDENT IN AN ORGANIZED HEALTH CARE EDUCATION/TRAINING PROGRAM

## 2025-08-21 PROCEDURE — 3078F DIAST BP <80 MM HG: CPT | Mod: ,,, | Performed by: STUDENT IN AN ORGANIZED HEALTH CARE EDUCATION/TRAINING PROGRAM

## 2025-08-21 PROCEDURE — 99395 PREV VISIT EST AGE 18-39: CPT | Mod: S$PBB,,, | Performed by: STUDENT IN AN ORGANIZED HEALTH CARE EDUCATION/TRAINING PROGRAM

## 2025-08-21 PROCEDURE — 99999 PR PBB SHADOW E&M-EST. PATIENT-LVL III: CPT | Mod: PBBFAC,,, | Performed by: STUDENT IN AN ORGANIZED HEALTH CARE EDUCATION/TRAINING PROGRAM

## (undated) DEVICE — GLOVE SURGICAL PROTEXIS PI SIZE 6

## (undated) DEVICE — FILTER ASSEMBLY F/SUCTION D&C

## (undated) DEVICE — GLOVE SURGICAL PROTEXIS PI SIZE 6.5

## (undated) DEVICE — CDS LITHOTOMY

## (undated) DEVICE — GLOVE SURGICAL PROTEXIS PI BLUE SIZE 6.5

## (undated) DEVICE — BETADINE SOLUTION PREP 4 OZ.

## (undated) DEVICE — TRAY VAGINAL SKIN PREP DRY

## (undated) DEVICE — SET COLLECTION DISP STL TUBING 6FT

## (undated) DEVICE — Device